# Patient Record
Sex: FEMALE | Race: WHITE | NOT HISPANIC OR LATINO | Employment: OTHER | URBAN - METROPOLITAN AREA
[De-identification: names, ages, dates, MRNs, and addresses within clinical notes are randomized per-mention and may not be internally consistent; named-entity substitution may affect disease eponyms.]

---

## 2017-03-08 ENCOUNTER — HOSPITAL ENCOUNTER (OUTPATIENT)
Dept: ONCOLOGY | Facility: CLINIC | Age: 67
Discharge: HOME | End: 2017-03-08
Attending: INTERNAL MEDICINE | Admitting: INTERNAL MEDICINE

## 2017-03-08 LAB
ALBUMIN SERPL-MCNC: 4 G/DL (ref 3.5–4.8)
ALBUMIN/GLOB SERPL: 1.5 {RATIO} (ref 1–1.7)
ALP SERPL-CCNC: 43 IU/L (ref 32–91)
ALT SERPL-CCNC: 26 IU/L (ref 14–54)
ANION GAP SERPL CALC-SCNC: 15.4 MMOL/L (ref 10–20)
AST SERPL-CCNC: 33 IU/L (ref 15–41)
BILIRUB SERPL-MCNC: 0.5 MG/DL (ref 0.3–1.2)
BUN SERPL-MCNC: 7 MG/DL (ref 8–20)
BUN/CREAT SERPL: 8.8 (ref 5.4–26.2)
CALCIUM SERPL-MCNC: 9.2 MG/DL (ref 8.9–10.3)
CHLORIDE SERPL-SCNC: 96 MMOL/L (ref 101–111)
CONV CO2: 28 MMOL/L (ref 22–32)
CONV TOTAL PROTEIN: 6.6 G/DL (ref 6.1–7.9)
CREAT UR-MCNC: 0.8 MG/DL (ref 0.4–1)
GLOBULIN UR ELPH-MCNC: 2.6 G/DL (ref 2.5–3.8)
GLUCOSE SERPL-MCNC: 125 MG/DL (ref 65–99)
POTASSIUM SERPL-SCNC: 3.4 MMOL/L (ref 3.6–5.1)
SODIUM SERPL-SCNC: 136 MMOL/L (ref 136–144)

## 2017-06-07 ENCOUNTER — HOSPITAL ENCOUNTER (OUTPATIENT)
Dept: LAB | Facility: HOSPITAL | Age: 67
Discharge: HOME | End: 2017-06-07
Attending: OTOLARYNGOLOGY | Admitting: OTOLARYNGOLOGY

## 2017-06-09 LAB
ANA SER QL IA: NORMAL
EBV AB TO VIRAL CAPSID AG, IGM: NORMAL
ENA SS-B AB SER-ACNC: NEGATIVE
SJOGREN'S ANTI-SS-A: NEGATIVE

## 2017-06-30 ENCOUNTER — HOSPITAL ENCOUNTER (OUTPATIENT)
Dept: OTHER | Facility: HOSPITAL | Age: 67
Setting detail: SPECIMEN
Discharge: HOME | End: 2017-06-30
Attending: OTOLARYNGOLOGY | Admitting: OTOLARYNGOLOGY

## 2017-07-03 ENCOUNTER — HOSPITAL ENCOUNTER (OUTPATIENT)
Dept: LAB | Facility: HOSPITAL | Age: 67
Discharge: HOME | End: 2017-07-03
Attending: OTOLARYNGOLOGY | Admitting: OTOLARYNGOLOGY

## 2017-07-03 LAB
FOLATE SERPL-MCNC: >24.8 NG/ML (ref 5.9–24.8)
VIT B12 SERPL-MCNC: >1500 PG/ML (ref 180–914)

## 2017-07-21 ENCOUNTER — CONVERSION ENCOUNTER (OUTPATIENT)
Dept: CARDIOLOGY | Facility: CLINIC | Age: 67
End: 2017-07-21

## 2017-07-21 ENCOUNTER — HOSPITAL ENCOUNTER (OUTPATIENT)
Dept: CARDIOLOGY | Facility: HOSPITAL | Age: 67
Discharge: HOME | End: 2017-07-21
Attending: INTERNAL MEDICINE | Admitting: INTERNAL MEDICINE

## 2017-09-05 ENCOUNTER — CLINICAL SUPPORT (OUTPATIENT)
Dept: ONCOLOGY | Facility: HOSPITAL | Age: 67
End: 2017-09-05

## 2017-09-05 ENCOUNTER — HOSPITAL ENCOUNTER (OUTPATIENT)
Dept: ONCOLOGY | Facility: CLINIC | Age: 67
Setting detail: INFUSION SERIES
Discharge: HOME | End: 2017-09-05
Attending: INTERNAL MEDICINE | Admitting: INTERNAL MEDICINE

## 2017-09-05 ENCOUNTER — HOSPITAL ENCOUNTER (OUTPATIENT)
Dept: ONCOLOGY | Facility: HOSPITAL | Age: 67
Discharge: HOME | End: 2017-09-05
Attending: INTERNAL MEDICINE | Admitting: INTERNAL MEDICINE

## 2017-09-05 NOTE — PROGRESS NOTES
PATIENTS ONCOLOGY RECORD LOCATED IN [facility]      Subjective     Name:  KATJA MCMAHON     Date:  2017  Address:  [place] [address]  Home: [phone]  :  1950 AGE:  66 y.o.        RECORDS OBTAINED:  Patients Oncology Record is located in [facility]

## 2017-09-12 ENCOUNTER — HOSPITAL ENCOUNTER (OUTPATIENT)
Dept: MAMMOGRAPHY | Facility: HOSPITAL | Age: 67
Discharge: HOME | End: 2017-09-12
Attending: INTERNAL MEDICINE | Admitting: INTERNAL MEDICINE

## 2017-09-19 ENCOUNTER — HOSPITAL ENCOUNTER (OUTPATIENT)
Dept: MAMMOGRAPHY | Facility: HOSPITAL | Age: 67
Discharge: HOME | End: 2017-09-19
Attending: INTERNAL MEDICINE | Admitting: INTERNAL MEDICINE

## 2017-09-22 ENCOUNTER — HOSPITAL ENCOUNTER (OUTPATIENT)
Dept: ONCOLOGY | Facility: CLINIC | Age: 67
Setting detail: INFUSION SERIES
Discharge: HOME | End: 2017-09-22
Attending: INTERNAL MEDICINE | Admitting: INTERNAL MEDICINE

## 2017-09-22 ENCOUNTER — CLINICAL SUPPORT (OUTPATIENT)
Dept: ONCOLOGY | Facility: HOSPITAL | Age: 67
End: 2017-09-22

## 2017-09-22 ENCOUNTER — HOSPITAL ENCOUNTER (OUTPATIENT)
Dept: ONCOLOGY | Facility: HOSPITAL | Age: 67
Discharge: HOME | End: 2017-09-22
Attending: INTERNAL MEDICINE | Admitting: INTERNAL MEDICINE

## 2017-09-22 NOTE — PROGRESS NOTES
PATIENTS ONCOLOGY RECORD LOCATED IN [facility]      Subjective     Name:  KATJA MCMAHON     Date:  2017  Address:  [place] [address]  Home: [phone]  :  1950 AGE:  66 y.o.        RECORDS OBTAINED:  Patients Oncology Record is located in [facility]

## 2017-09-29 ENCOUNTER — HOSPITAL ENCOUNTER (OUTPATIENT)
Dept: ONCOLOGY | Facility: HOSPITAL | Age: 67
Discharge: HOME | End: 2017-09-29
Attending: INTERNAL MEDICINE | Admitting: INTERNAL MEDICINE

## 2017-10-02 ENCOUNTER — HOSPITAL ENCOUNTER (OUTPATIENT)
Dept: MAMMOGRAPHY | Facility: HOSPITAL | Age: 67
Discharge: HOME | End: 2017-10-02
Attending: INTERNAL MEDICINE | Admitting: INTERNAL MEDICINE

## 2017-10-05 ENCOUNTER — HOSPITAL ENCOUNTER (OUTPATIENT)
Dept: ONCOLOGY | Facility: HOSPITAL | Age: 67
Discharge: HOME | End: 2017-10-05
Attending: INTERNAL MEDICINE | Admitting: INTERNAL MEDICINE

## 2017-10-05 ENCOUNTER — CLINICAL SUPPORT (OUTPATIENT)
Dept: ONCOLOGY | Facility: HOSPITAL | Age: 67
End: 2017-10-05

## 2017-10-05 ENCOUNTER — HOSPITAL ENCOUNTER (OUTPATIENT)
Dept: ONCOLOGY | Facility: CLINIC | Age: 67
Setting detail: INFUSION SERIES
Discharge: HOME | End: 2017-10-05
Attending: INTERNAL MEDICINE | Admitting: INTERNAL MEDICINE

## 2017-10-05 LAB
T3 SERPL-MCNC: 1.11 NG/ML (ref 0.87–1.78)
T4 SERPL-MCNC: 11.54 UG/DL (ref 6.1–12.2)
TSH SERPL-ACNC: 0.51 UIU/ML (ref 0.34–5.6)

## 2017-10-05 NOTE — PROGRESS NOTES
PATIENTS ONCOLOGY RECORD LOCATED IN [facility]      Subjective     Name:  KATJA MCMAHON     Date:  10/05/2017  Address:  [place] [address]  Home: [phone]  :  1950 AGE:  66 y.o.        RECORDS OBTAINED:  Patients Oncology Record is located in [facility]

## 2017-10-10 ENCOUNTER — CONVERSION ENCOUNTER (OUTPATIENT)
Dept: CARDIOLOGY | Facility: CLINIC | Age: 67
End: 2017-10-10

## 2017-10-11 ENCOUNTER — HOSPITAL ENCOUNTER (OUTPATIENT)
Dept: ULTRASOUND IMAGING | Facility: HOSPITAL | Age: 67
Discharge: HOME | End: 2017-10-11
Attending: INTERNAL MEDICINE | Admitting: INTERNAL MEDICINE

## 2017-11-01 ENCOUNTER — HOSPITAL ENCOUNTER (OUTPATIENT)
Dept: PREADMISSION TESTING | Facility: HOSPITAL | Age: 67
Discharge: HOME | End: 2017-11-01
Attending: SURGERY | Admitting: SURGERY

## 2017-11-01 LAB
ANION GAP SERPL CALC-SCNC: 16.7 MMOL/L (ref 10–20)
APTT BLD: 24.2 SEC (ref 24–31)
BASOPHILS # BLD AUTO: 0 10*3/UL (ref 0–0.2)
BASOPHILS NFR BLD AUTO: 0 % (ref 0–2)
BUN SERPL-MCNC: 13 MG/DL (ref 8–20)
BUN/CREAT SERPL: 14.4 (ref 5.4–26.2)
CALCIUM SERPL-MCNC: 9.1 MG/DL (ref 8.9–10.3)
CHLORIDE SERPL-SCNC: 99 MMOL/L (ref 101–111)
CONV CO2: 26 MMOL/L (ref 22–32)
CREAT UR-MCNC: 0.9 MG/DL (ref 0.4–1)
DIFFERENTIAL METHOD BLD: (no result)
EOSINOPHIL # BLD AUTO: 0 10*3/UL (ref 0–0.3)
EOSINOPHIL # BLD AUTO: 1 % (ref 0–3)
ERYTHROCYTE [DISTWIDTH] IN BLOOD BY AUTOMATED COUNT: 13.3 % (ref 11.5–14.5)
GLUCOSE SERPL-MCNC: 91 MG/DL (ref 65–99)
HCT VFR BLD AUTO: 38.4 % (ref 35–49)
HGB BLD-MCNC: 12.8 G/DL (ref 12–15)
INR PPP: 0.9
LYMPHOCYTES # BLD AUTO: 2.2 10*3/UL (ref 0.8–4.8)
LYMPHOCYTES NFR BLD AUTO: 32 % (ref 18–42)
MCH RBC QN AUTO: 29.4 PG (ref 26–32)
MCHC RBC AUTO-ENTMCNC: 33.5 G/DL (ref 32–36)
MCV RBC AUTO: 87.8 FL (ref 80–94)
MONOCYTES # BLD AUTO: 0.4 10*3/UL (ref 0.1–1.3)
MONOCYTES NFR BLD AUTO: 5 % (ref 2–11)
NEUTROPHILS # BLD AUTO: 4.3 10*3/UL (ref 2.3–8.6)
NEUTROPHILS NFR BLD AUTO: 62 % (ref 50–75)
NRBC BLD AUTO-RTO: 0 /100{WBCS}
NRBC/RBC NFR BLD MANUAL: 0 10*3/UL
PLATELET # BLD AUTO: 237 10*3/UL (ref 150–450)
PMV BLD AUTO: 8.9 FL (ref 7.4–10.4)
POTASSIUM SERPL-SCNC: 3.7 MMOL/L (ref 3.6–5.1)
PROTHROMBIN TIME: 10.2 SEC (ref 9.6–11.7)
RBC # BLD AUTO: 4.37 10*6/UL (ref 4–5.4)
SODIUM SERPL-SCNC: 138 MMOL/L (ref 136–144)
WBC # BLD AUTO: 6.9 10*3/UL (ref 4.5–11.5)

## 2017-11-07 ENCOUNTER — HOSPITAL ENCOUNTER (OUTPATIENT)
Dept: PREOP | Facility: HOSPITAL | Age: 67
Setting detail: HOSPITAL OUTPATIENT SURGERY
Discharge: HOME | End: 2017-11-07
Attending: SURGERY | Admitting: SURGERY

## 2017-11-21 ENCOUNTER — CONVERSION ENCOUNTER (OUTPATIENT)
Dept: CARDIOLOGY | Facility: CLINIC | Age: 67
End: 2017-11-21

## 2017-11-29 ENCOUNTER — HOSPITAL ENCOUNTER (OUTPATIENT)
Dept: ONCOLOGY | Facility: HOSPITAL | Age: 67
Discharge: HOME | End: 2017-11-29
Attending: INTERNAL MEDICINE | Admitting: INTERNAL MEDICINE

## 2017-11-29 ENCOUNTER — HOSPITAL ENCOUNTER (OUTPATIENT)
Dept: ONCOLOGY | Facility: CLINIC | Age: 67
Setting detail: INFUSION SERIES
Discharge: HOME | End: 2017-11-29
Attending: INTERNAL MEDICINE | Admitting: INTERNAL MEDICINE

## 2017-11-29 ENCOUNTER — CLINICAL SUPPORT (OUTPATIENT)
Dept: ONCOLOGY | Facility: HOSPITAL | Age: 67
End: 2017-11-29

## 2017-11-29 LAB
ALBUMIN SERPL-MCNC: 4 G/DL (ref 3.5–4.8)
ALBUMIN/GLOB SERPL: 1.3 {RATIO} (ref 1–1.7)
ALP SERPL-CCNC: 54 IU/L (ref 32–91)
ALT SERPL-CCNC: 35 IU/L (ref 14–54)
ANION GAP SERPL CALC-SCNC: 10.3 MMOL/L (ref 10–20)
AST SERPL-CCNC: 43 IU/L (ref 15–41)
BILIRUB SERPL-MCNC: 0.4 MG/DL (ref 0.3–1.2)
BUN SERPL-MCNC: 6 MG/DL (ref 8–20)
BUN/CREAT SERPL: 6.7 (ref 5.4–26.2)
CALCIUM SERPL-MCNC: 8.9 MG/DL (ref 8.9–10.3)
CHLORIDE SERPL-SCNC: 100 MMOL/L (ref 101–111)
CONV CO2: 27 MMOL/L (ref 22–32)
CONV TOTAL PROTEIN: 7 G/DL (ref 6.1–7.9)
CREAT UR-MCNC: 0.9 MG/DL (ref 0.4–1)
GLOBULIN UR ELPH-MCNC: 3 G/DL (ref 2.5–3.8)
GLUCOSE SERPL-MCNC: 112 MG/DL (ref 65–99)
POTASSIUM SERPL-SCNC: 3.3 MMOL/L (ref 3.6–5.1)
SODIUM SERPL-SCNC: 134 MMOL/L (ref 136–144)

## 2017-11-29 NOTE — PROGRESS NOTES
PATIENTS ONCOLOGY RECORD LOCATED IN [facility]      Subjective     Name:  KATJA MCMAHON     Date:  2017  Address:  [place] [address]  Home: [phone]  :  1950 AGE:  67 y.o.        RECORDS OBTAINED:  Patients Oncology Record is located in [facility]

## 2017-12-05 ENCOUNTER — CONVERSION ENCOUNTER (OUTPATIENT)
Dept: CARDIOLOGY | Facility: CLINIC | Age: 67
End: 2017-12-05

## 2017-12-05 ENCOUNTER — HOSPITAL ENCOUNTER (OUTPATIENT)
Dept: CARDIOLOGY | Facility: HOSPITAL | Age: 67
Discharge: HOME | End: 2017-12-05
Attending: INTERNAL MEDICINE | Admitting: INTERNAL MEDICINE

## 2017-12-07 ENCOUNTER — HOSPITAL ENCOUNTER (OUTPATIENT)
Dept: PREADMISSION TESTING | Facility: HOSPITAL | Age: 67
Discharge: HOME | End: 2017-12-07
Attending: SURGERY | Admitting: SURGERY

## 2017-12-07 LAB
ANION GAP SERPL CALC-SCNC: 9.8 MMOL/L (ref 10–20)
BASOPHILS # BLD AUTO: 0 10*3/UL (ref 0–0.2)
BASOPHILS NFR BLD AUTO: 0 % (ref 0–2)
BUN SERPL-MCNC: 8 MG/DL (ref 8–20)
BUN/CREAT SERPL: 11.4 (ref 5.4–26.2)
CALCIUM SERPL-MCNC: 9 MG/DL (ref 8.9–10.3)
CHLORIDE SERPL-SCNC: 104 MMOL/L (ref 101–111)
CONV CO2: 28 MMOL/L (ref 22–32)
CREAT UR-MCNC: 0.7 MG/DL (ref 0.4–1)
DIFFERENTIAL METHOD BLD: (no result)
EOSINOPHIL # BLD AUTO: 0.2 10*3/UL (ref 0–0.3)
EOSINOPHIL # BLD AUTO: 3 % (ref 0–3)
ERYTHROCYTE [DISTWIDTH] IN BLOOD BY AUTOMATED COUNT: 13.5 % (ref 11.5–14.5)
GLUCOSE SERPL-MCNC: 107 MG/DL (ref 65–99)
HCT VFR BLD AUTO: 37.2 % (ref 35–49)
HGB BLD-MCNC: 12.3 G/DL (ref 12–15)
INR PPP: 0.9
LYMPHOCYTES # BLD AUTO: 2.1 10*3/UL (ref 0.8–4.8)
LYMPHOCYTES NFR BLD AUTO: 34 % (ref 18–42)
MCH RBC QN AUTO: 29.7 PG (ref 26–32)
MCHC RBC AUTO-ENTMCNC: 33.2 G/DL (ref 32–36)
MCV RBC AUTO: 89.5 FL (ref 80–94)
MONOCYTES # BLD AUTO: 0.4 10*3/UL (ref 0.1–1.3)
MONOCYTES NFR BLD AUTO: 6 % (ref 2–11)
NEUTROPHILS # BLD AUTO: 3.4 10*3/UL (ref 2.3–8.6)
NEUTROPHILS NFR BLD AUTO: 57 % (ref 50–75)
NRBC BLD AUTO-RTO: 0 /100{WBCS}
NRBC/RBC NFR BLD MANUAL: 0 10*3/UL
PLATELET # BLD AUTO: 291 10*3/UL (ref 150–450)
PMV BLD AUTO: 8.4 FL (ref 7.4–10.4)
POTASSIUM SERPL-SCNC: 3.8 MMOL/L (ref 3.6–5.1)
PROTHROMBIN TIME: 10.2 SEC (ref 9.6–11.7)
RBC # BLD AUTO: 4.16 10*6/UL (ref 4–5.4)
SODIUM SERPL-SCNC: 138 MMOL/L (ref 136–144)
WBC # BLD AUTO: 6.1 10*3/UL (ref 4.5–11.5)

## 2017-12-08 ENCOUNTER — HOSPITAL ENCOUNTER (OUTPATIENT)
Dept: PREOP | Facility: HOSPITAL | Age: 67
Setting detail: HOSPITAL OUTPATIENT SURGERY
Discharge: HOME | End: 2017-12-08
Attending: SURGERY | Admitting: SURGERY

## 2017-12-12 ENCOUNTER — HOSPITAL ENCOUNTER (OUTPATIENT)
Dept: ONCOLOGY | Facility: HOSPITAL | Age: 67
Discharge: HOME | End: 2017-12-12
Attending: INTERNAL MEDICINE | Admitting: INTERNAL MEDICINE

## 2017-12-12 ENCOUNTER — HOSPITAL ENCOUNTER (OUTPATIENT)
Dept: ONCOLOGY | Facility: CLINIC | Age: 67
Setting detail: INFUSION SERIES
Discharge: HOME | End: 2017-12-12
Attending: INTERNAL MEDICINE | Admitting: INTERNAL MEDICINE

## 2017-12-12 ENCOUNTER — CLINICAL SUPPORT (OUTPATIENT)
Dept: ONCOLOGY | Facility: HOSPITAL | Age: 67
End: 2017-12-12

## 2017-12-12 NOTE — PROGRESS NOTES
PATIENTS ONCOLOGY RECORD LOCATED IN [employer]      Subjective     Name:  KATJA MCMAHON     Date:  2017  Address:  [place] [address]  Home: [phone]  :  1950 AGE:  67 y.o.        RECORDS OBTAINED:  Patients Oncology Record is located in [facility]

## 2017-12-14 ENCOUNTER — CLINICAL SUPPORT (OUTPATIENT)
Dept: ONCOLOGY | Facility: HOSPITAL | Age: 67
End: 2017-12-14

## 2017-12-14 ENCOUNTER — HOSPITAL ENCOUNTER (OUTPATIENT)
Dept: ONCOLOGY | Facility: CLINIC | Age: 67
Setting detail: INFUSION SERIES
Discharge: HOME | End: 2017-12-14
Attending: INTERNAL MEDICINE | Admitting: INTERNAL MEDICINE

## 2017-12-14 ENCOUNTER — HOSPITAL ENCOUNTER (OUTPATIENT)
Dept: ONCOLOGY | Facility: HOSPITAL | Age: 67
Discharge: HOME | End: 2017-12-14
Attending: INTERNAL MEDICINE | Admitting: INTERNAL MEDICINE

## 2017-12-14 LAB
ALBUMIN SERPL-MCNC: 3.8 G/DL (ref 3.5–4.8)
ALBUMIN/GLOB SERPL: 1.5 {RATIO} (ref 1–1.7)
ALP SERPL-CCNC: 53 IU/L (ref 32–91)
ALT SERPL-CCNC: 28 IU/L (ref 14–54)
ANION GAP SERPL CALC-SCNC: 12.8 MMOL/L (ref 10–20)
AST SERPL-CCNC: 32 IU/L (ref 15–41)
BILIRUB SERPL-MCNC: 0.5 MG/DL (ref 0.3–1.2)
BUN SERPL-MCNC: 8 MG/DL (ref 8–20)
BUN/CREAT SERPL: 10 (ref 5.4–26.2)
CALCIUM SERPL-MCNC: 9 MG/DL (ref 8.9–10.3)
CHLORIDE SERPL-SCNC: 101 MMOL/L (ref 101–111)
CONV CO2: 26 MMOL/L (ref 22–32)
CONV TOTAL PROTEIN: 6.4 G/DL (ref 6.1–7.9)
CREAT UR-MCNC: 0.8 MG/DL (ref 0.4–1)
GLOBULIN UR ELPH-MCNC: 2.6 G/DL (ref 2.5–3.8)
GLUCOSE SERPL-MCNC: 101 MG/DL (ref 65–99)
POTASSIUM SERPL-SCNC: 3.8 MMOL/L (ref 3.6–5.1)
SODIUM SERPL-SCNC: 136 MMOL/L (ref 136–144)

## 2017-12-14 NOTE — PROGRESS NOTES
PATIENTS ONCOLOGY RECORD LOCATED IN [employer]      Subjective     Name:  KATJA MCMAHON     Date:  2017  Address:  [place] [address]  Home: [phone]  :  1950 AGE:  67 y.o.        RECORDS OBTAINED:  Patients Oncology Record is located in [facility]

## 2017-12-18 ENCOUNTER — HOSPITAL ENCOUNTER (OUTPATIENT)
Dept: ONCOLOGY | Facility: HOSPITAL | Age: 67
Discharge: HOME | End: 2017-12-18
Attending: INTERNAL MEDICINE | Admitting: INTERNAL MEDICINE

## 2017-12-18 ENCOUNTER — CLINICAL SUPPORT (OUTPATIENT)
Dept: ONCOLOGY | Facility: HOSPITAL | Age: 67
End: 2017-12-18

## 2017-12-18 ENCOUNTER — HOSPITAL ENCOUNTER (OUTPATIENT)
Dept: ONCOLOGY | Facility: CLINIC | Age: 67
Setting detail: INFUSION SERIES
Discharge: HOME | End: 2017-12-18
Attending: INTERNAL MEDICINE | Admitting: INTERNAL MEDICINE

## 2017-12-18 NOTE — PROGRESS NOTES
PATIENTS ONCOLOGY RECORD LOCATED IN [facility]      Subjective     Name:  KATJA MCMAHON     Date:  2017  Address:  [place] [address]  Home: [phone]  :  1950 AGE:  67 y.o.        RECORDS OBTAINED:  Patients Oncology Record is located in [facility]

## 2017-12-28 ENCOUNTER — CONVERSION ENCOUNTER (OUTPATIENT)
Dept: CARDIOLOGY | Facility: CLINIC | Age: 67
End: 2017-12-28

## 2017-12-28 ENCOUNTER — HOSPITAL ENCOUNTER (OUTPATIENT)
Dept: ONCOLOGY | Facility: HOSPITAL | Age: 67
Discharge: HOME | End: 2017-12-28
Attending: INTERNAL MEDICINE | Admitting: INTERNAL MEDICINE

## 2017-12-28 ENCOUNTER — HOSPITAL ENCOUNTER (OUTPATIENT)
Dept: ONCOLOGY | Facility: CLINIC | Age: 67
Setting detail: INFUSION SERIES
Discharge: HOME | End: 2017-12-28
Attending: INTERNAL MEDICINE | Admitting: INTERNAL MEDICINE

## 2017-12-28 ENCOUNTER — CLINICAL SUPPORT (OUTPATIENT)
Dept: ONCOLOGY | Facility: HOSPITAL | Age: 67
End: 2017-12-28

## 2017-12-28 LAB
ALBUMIN SERPL-MCNC: 3.6 G/DL (ref 3.5–4.8)
ALBUMIN/GLOB SERPL: 1.1 {RATIO} (ref 1–1.7)
ALP SERPL-CCNC: 58 IU/L (ref 32–91)
ALT SERPL-CCNC: 26 IU/L (ref 14–54)
ANION GAP SERPL CALC-SCNC: 13.8 MMOL/L (ref 10–20)
AST SERPL-CCNC: 30 IU/L (ref 15–41)
BILIRUB SERPL-MCNC: 0.2 MG/DL (ref 0.3–1.2)
BUN SERPL-MCNC: 4 MG/DL (ref 8–20)
BUN/CREAT SERPL: 5.7 (ref 5.4–26.2)
CALCIUM SERPL-MCNC: 8.8 MG/DL (ref 8.9–10.3)
CHLORIDE SERPL-SCNC: 99 MMOL/L (ref 101–111)
CONV CO2: 27 MMOL/L (ref 22–32)
CONV TOTAL PROTEIN: 6.9 G/DL (ref 6.1–7.9)
CREAT UR-MCNC: 0.7 MG/DL (ref 0.4–1)
GLOBULIN UR ELPH-MCNC: 3.3 G/DL (ref 2.5–3.8)
GLUCOSE SERPL-MCNC: 120 MG/DL (ref 65–99)
POTASSIUM SERPL-SCNC: 3.8 MMOL/L (ref 3.6–5.1)
SODIUM SERPL-SCNC: 136 MMOL/L (ref 136–144)

## 2017-12-28 NOTE — PROGRESS NOTES
PATIENTS ONCOLOGY RECORD LOCATED IN [employer]      Subjective     Name:  KATJA MCMAHON     Date:  2017  Address:  [place] [address]  Home: [phone]  :  1950 AGE:  67 y.o.        RECORDS OBTAINED:  Patients Oncology Record is located in [facility]

## 2018-01-04 ENCOUNTER — HOSPITAL ENCOUNTER (OUTPATIENT)
Dept: ONCOLOGY | Facility: CLINIC | Age: 68
Setting detail: INFUSION SERIES
Discharge: HOME | End: 2018-01-04
Attending: INTERNAL MEDICINE | Admitting: INTERNAL MEDICINE

## 2018-01-04 ENCOUNTER — CLINICAL SUPPORT (OUTPATIENT)
Dept: ONCOLOGY | Facility: HOSPITAL | Age: 68
End: 2018-01-04

## 2018-01-04 NOTE — PROGRESS NOTES
PATIENTS ONCOLOGY RECORD LOCATED IN [employer]      Subjective     Name:  KATJA MCMAHON     Date:  2018  Address:  [place] [address]  Home: [phone]  :  1950 AGE:  67 y.o.        RECORDS OBTAINED:  Patients Oncology Record is located in [facility]

## 2018-01-10 ENCOUNTER — CLINICAL SUPPORT (OUTPATIENT)
Dept: ONCOLOGY | Facility: HOSPITAL | Age: 68
End: 2018-01-10

## 2018-01-10 ENCOUNTER — HOSPITAL ENCOUNTER (OUTPATIENT)
Dept: ONCOLOGY | Facility: CLINIC | Age: 68
Setting detail: INFUSION SERIES
Discharge: HOME | End: 2018-01-10
Attending: INTERNAL MEDICINE | Admitting: INTERNAL MEDICINE

## 2018-01-10 NOTE — PROGRESS NOTES
PATIENTS ONCOLOGY RECORD LOCATED IN [employer]      Subjective     Name:  KATJA MCMAHON     Date:  01/10/2018  Address:  [place] [address]  Home: [phone]  :  1950 AGE:  67 y.o.        RECORDS OBTAINED:  Patients Oncology Record is located in [facility]

## 2018-01-11 ENCOUNTER — HOSPITAL ENCOUNTER (OUTPATIENT)
Dept: LAB | Facility: HOSPITAL | Age: 68
Discharge: HOME | End: 2018-01-11
Attending: INTERNAL MEDICINE | Admitting: INTERNAL MEDICINE

## 2018-01-11 LAB
ALBUMIN SERPL-MCNC: 3.5 G/DL (ref 3.5–4.8)
ALBUMIN/GLOB SERPL: 1.5 {RATIO} (ref 1–1.7)
ALP SERPL-CCNC: 53 IU/L (ref 32–91)
ALT SERPL-CCNC: 15 IU/L (ref 14–54)
ANION GAP SERPL CALC-SCNC: 10.6 MMOL/L (ref 10–20)
AST SERPL-CCNC: 14 IU/L (ref 15–41)
BILIRUB SERPL-MCNC: 0.9 MG/DL (ref 0.3–1.2)
BUN SERPL-MCNC: 12 MG/DL (ref 8–20)
BUN/CREAT SERPL: 17.1 (ref 5.4–26.2)
CALCIUM SERPL-MCNC: 8.7 MG/DL (ref 8.9–10.3)
CHLORIDE SERPL-SCNC: 100 MMOL/L (ref 101–111)
CHOLEST SERPL-MCNC: 185 MG/DL
CHOLEST/HDLC SERPL: 3.5 {RATIO}
CK SERPL-CCNC: 17 IU/L (ref 38–234)
CONV CO2: 29 MMOL/L (ref 22–32)
CONV LDL CHOLESTEROL DIRECT: 105 MG/DL (ref 0–100)
CONV TOTAL PROTEIN: 5.8 G/DL (ref 6.1–7.9)
CREAT UR-MCNC: 0.7 MG/DL (ref 0.4–1)
GLOBULIN UR ELPH-MCNC: 2.3 G/DL (ref 2.5–3.8)
GLUCOSE SERPL-MCNC: 120 MG/DL (ref 65–99)
HDLC SERPL-MCNC: 53 MG/DL
LDLC/HDLC SERPL: 2 {RATIO}
LIPID INTERPRETATION: ABNORMAL
POTASSIUM SERPL-SCNC: 3.6 MMOL/L (ref 3.6–5.1)
SODIUM SERPL-SCNC: 136 MMOL/L (ref 136–144)
TRIGL SERPL-MCNC: 78 MG/DL
VLDLC SERPL CALC-MCNC: 27 MG/DL

## 2018-01-17 ENCOUNTER — HOSPITAL ENCOUNTER (OUTPATIENT)
Dept: ONCOLOGY | Facility: HOSPITAL | Age: 68
Discharge: HOME | End: 2018-01-17
Attending: INTERNAL MEDICINE | Admitting: INTERNAL MEDICINE

## 2018-01-17 ENCOUNTER — CLINICAL SUPPORT (OUTPATIENT)
Dept: ONCOLOGY | Facility: HOSPITAL | Age: 68
End: 2018-01-17

## 2018-01-17 ENCOUNTER — HOSPITAL ENCOUNTER (OUTPATIENT)
Dept: ONCOLOGY | Facility: CLINIC | Age: 68
Setting detail: INFUSION SERIES
Discharge: HOME | End: 2018-01-17
Attending: INTERNAL MEDICINE | Admitting: INTERNAL MEDICINE

## 2018-01-17 LAB
ALBUMIN SERPL-MCNC: 3.8 G/DL (ref 3.5–4.8)
ALBUMIN/GLOB SERPL: 1.5 {RATIO} (ref 1–1.7)
ALP SERPL-CCNC: 63 IU/L (ref 32–91)
ALT SERPL-CCNC: 13 IU/L (ref 14–54)
ANION GAP SERPL CALC-SCNC: 12.6 MMOL/L (ref 10–20)
AST SERPL-CCNC: 19 IU/L (ref 15–41)
BILIRUB SERPL-MCNC: 0.3 MG/DL (ref 0.3–1.2)
BUN SERPL-MCNC: 7 MG/DL (ref 8–20)
BUN/CREAT SERPL: 10 (ref 5.4–26.2)
CALCIUM SERPL-MCNC: 9.3 MG/DL (ref 8.9–10.3)
CHLORIDE SERPL-SCNC: 100 MMOL/L (ref 101–111)
CONV CO2: 28 MMOL/L (ref 22–32)
CONV TOTAL PROTEIN: 6.4 G/DL (ref 6.1–7.9)
CREAT UR-MCNC: 0.7 MG/DL (ref 0.4–1)
GLOBULIN UR ELPH-MCNC: 2.6 G/DL (ref 2.5–3.8)
GLUCOSE SERPL-MCNC: 115 MG/DL (ref 65–99)
POTASSIUM SERPL-SCNC: 3.6 MMOL/L (ref 3.6–5.1)
SODIUM SERPL-SCNC: 137 MMOL/L (ref 136–144)

## 2018-01-17 NOTE — PROGRESS NOTES
PATIENTS ONCOLOGY RECORD LOCATED IN [employer]      Subjective     Name:  KATJA MCMAHON     Date:  2018  Address:  [place] [address]  Home: [phone]  :  1950 AGE:  67 y.o.        RECORDS OBTAINED:  Patients Oncology Record is located in [facility]

## 2018-01-19 ENCOUNTER — CONVERSION ENCOUNTER (OUTPATIENT)
Dept: CARDIOLOGY | Facility: CLINIC | Age: 68
End: 2018-01-19

## 2018-01-25 ENCOUNTER — HOSPITAL ENCOUNTER (OUTPATIENT)
Dept: ONCOLOGY | Facility: CLINIC | Age: 68
Setting detail: INFUSION SERIES
Discharge: HOME | End: 2018-01-25
Attending: INTERNAL MEDICINE | Admitting: INTERNAL MEDICINE

## 2018-01-25 ENCOUNTER — HOSPITAL ENCOUNTER (OUTPATIENT)
Dept: ONCOLOGY | Facility: HOSPITAL | Age: 68
Discharge: HOME | End: 2018-01-25
Attending: INTERNAL MEDICINE | Admitting: INTERNAL MEDICINE

## 2018-01-25 ENCOUNTER — CLINICAL SUPPORT (OUTPATIENT)
Dept: ONCOLOGY | Facility: HOSPITAL | Age: 68
End: 2018-01-25

## 2018-01-25 LAB
ALBUMIN SERPL-MCNC: 3.5 G/DL (ref 3.5–4.8)
ALBUMIN/GLOB SERPL: 1.3 {RATIO} (ref 1–1.7)
ALP SERPL-CCNC: 50 IU/L (ref 32–91)
ALT SERPL-CCNC: 21 IU/L (ref 14–54)
ANION GAP SERPL CALC-SCNC: 12.4 MMOL/L (ref 10–20)
AST SERPL-CCNC: 27 IU/L (ref 15–41)
BILIRUB SERPL-MCNC: 0.4 MG/DL (ref 0.3–1.2)
BUN SERPL-MCNC: 12 MG/DL (ref 8–20)
BUN/CREAT SERPL: 20 (ref 5.4–26.2)
CALCIUM SERPL-MCNC: 9.1 MG/DL (ref 8.9–10.3)
CHLORIDE SERPL-SCNC: 101 MMOL/L (ref 101–111)
CONV CO2: 26 MMOL/L (ref 22–32)
CONV TOTAL PROTEIN: 6.2 G/DL (ref 6.1–7.9)
CREAT UR-MCNC: 0.6 MG/DL (ref 0.4–1)
GLOBULIN UR ELPH-MCNC: 2.7 G/DL (ref 2.5–3.8)
GLUCOSE SERPL-MCNC: 120 MG/DL (ref 65–99)
POTASSIUM SERPL-SCNC: 3.4 MMOL/L (ref 3.6–5.1)
SODIUM SERPL-SCNC: 136 MMOL/L (ref 136–144)

## 2018-01-25 NOTE — PROGRESS NOTES
PATIENTS ONCOLOGY RECORD LOCATED IN [facility]      Subjective     Name:  KATJA MCMAHON     Date:  2018  Address:  [place] [address]  Home: [phone]  :  1950 AGE:  67 y.o.        RECORDS OBTAINED:  Patients Oncology Record is located in [facility]

## 2018-01-31 ENCOUNTER — HOSPITAL ENCOUNTER (OUTPATIENT)
Dept: ONCOLOGY | Facility: CLINIC | Age: 68
Setting detail: INFUSION SERIES
Discharge: HOME | End: 2018-01-31
Attending: INTERNAL MEDICINE | Admitting: INTERNAL MEDICINE

## 2018-01-31 ENCOUNTER — CLINICAL SUPPORT (OUTPATIENT)
Dept: ONCOLOGY | Facility: HOSPITAL | Age: 68
End: 2018-01-31

## 2018-01-31 NOTE — PROGRESS NOTES
PATIENTS ONCOLOGY RECORD LOCATED IN [facility]      Subjective     Name:  KATJA MCMAHON     Date:  2018  Address:  [place] [address]  Home: [phone]  :  1950 AGE:  67 y.o.        RECORDS OBTAINED:  Patients Oncology Record is located in [facility]

## 2018-02-08 ENCOUNTER — CLINICAL SUPPORT (OUTPATIENT)
Dept: ONCOLOGY | Facility: HOSPITAL | Age: 68
End: 2018-02-08

## 2018-02-08 ENCOUNTER — HOSPITAL ENCOUNTER (OUTPATIENT)
Dept: ONCOLOGY | Facility: CLINIC | Age: 68
Setting detail: INFUSION SERIES
Discharge: HOME | End: 2018-02-08
Attending: INTERNAL MEDICINE | Admitting: INTERNAL MEDICINE

## 2018-02-08 NOTE — PROGRESS NOTES
PATIENTS ONCOLOGY RECORD LOCATED IN [facility]      Subjective     Name:  KATJA MCMAHON     Date:  2018  Address:  [place] [address]  Home: [phone]  :  1950 AGE:  67 y.o.        RECORDS OBTAINED:  Patients Oncology Record is located in [facility]

## 2018-02-15 ENCOUNTER — HOSPITAL ENCOUNTER (OUTPATIENT)
Dept: ONCOLOGY | Facility: CLINIC | Age: 68
Setting detail: INFUSION SERIES
Discharge: HOME | End: 2018-02-15
Attending: INTERNAL MEDICINE | Admitting: INTERNAL MEDICINE

## 2018-02-15 ENCOUNTER — HOSPITAL ENCOUNTER (OUTPATIENT)
Dept: ONCOLOGY | Facility: HOSPITAL | Age: 68
Discharge: HOME | End: 2018-02-15
Attending: INTERNAL MEDICINE | Admitting: INTERNAL MEDICINE

## 2018-02-15 ENCOUNTER — CLINICAL SUPPORT (OUTPATIENT)
Dept: ONCOLOGY | Facility: HOSPITAL | Age: 68
End: 2018-02-15

## 2018-02-15 LAB
ALBUMIN SERPL-MCNC: 3.6 G/DL (ref 3.5–4.8)
ALBUMIN/GLOB SERPL: 1.4 {RATIO} (ref 1–1.7)
ALP SERPL-CCNC: 56 IU/L (ref 32–91)
ALT SERPL-CCNC: 43 IU/L (ref 14–54)
ANION GAP SERPL CALC-SCNC: 11.9 MMOL/L (ref 10–20)
AST SERPL-CCNC: 47 IU/L (ref 15–41)
BILIRUB SERPL-MCNC: 0.2 MG/DL (ref 0.3–1.2)
BUN SERPL-MCNC: 11 MG/DL (ref 8–20)
BUN/CREAT SERPL: 18.3 (ref 5.4–26.2)
CALCIUM SERPL-MCNC: 9 MG/DL (ref 8.9–10.3)
CHLORIDE SERPL-SCNC: 103 MMOL/L (ref 101–111)
CONV CO2: 25 MMOL/L (ref 22–32)
CONV TOTAL PROTEIN: 6.1 G/DL (ref 6.1–7.9)
CREAT UR-MCNC: 0.6 MG/DL (ref 0.4–1)
GLOBULIN UR ELPH-MCNC: 2.5 G/DL (ref 2.5–3.8)
GLUCOSE SERPL-MCNC: 110 MG/DL (ref 65–99)
POTASSIUM SERPL-SCNC: 3.9 MMOL/L (ref 3.6–5.1)
SODIUM SERPL-SCNC: 136 MMOL/L (ref 136–144)

## 2018-02-15 NOTE — PROGRESS NOTES
PATIENTS ONCOLOGY RECORD LOCATED IN [employer]      Subjective     Name:  KATJA MCMAHON     Date:  02/15/2018  Address:  [place] [address]  Home: [phone]  :  1950 AGE:  67 y.o.        RECORDS OBTAINED:  Patients Oncology Record is located in [facility]

## 2018-02-22 ENCOUNTER — CLINICAL SUPPORT (OUTPATIENT)
Dept: ONCOLOGY | Facility: HOSPITAL | Age: 68
End: 2018-02-22

## 2018-02-22 ENCOUNTER — HOSPITAL ENCOUNTER (OUTPATIENT)
Dept: ONCOLOGY | Facility: CLINIC | Age: 68
Setting detail: INFUSION SERIES
Discharge: HOME | End: 2018-02-22
Attending: INTERNAL MEDICINE | Admitting: INTERNAL MEDICINE

## 2018-02-22 NOTE — PROGRESS NOTES
PATIENTS ONCOLOGY RECORD LOCATED IN [employer]      Subjective     Name:  KATJA MCMAHON     Date:  2018  Address:  [place] [address]  Home: [phone]  :  1950 AGE:  67 y.o.        RECORDS OBTAINED:  Patients Oncology Record is located in [facility]

## 2018-02-27 ENCOUNTER — CLINICAL SUPPORT (OUTPATIENT)
Dept: ONCOLOGY | Facility: HOSPITAL | Age: 68
End: 2018-02-27

## 2018-02-27 ENCOUNTER — HOSPITAL ENCOUNTER (OUTPATIENT)
Dept: ONCOLOGY | Facility: CLINIC | Age: 68
Setting detail: INFUSION SERIES
Discharge: HOME | End: 2018-02-27
Attending: INTERNAL MEDICINE | Admitting: INTERNAL MEDICINE

## 2018-02-27 NOTE — PROGRESS NOTES
PATIENTS ONCOLOGY RECORD LOCATED IN [employer]      Subjective     Name:  KATJA MCMAHON     Date:  2018  Address:  [place] [address]  Home: [phone]  :  1950 AGE:  67 y.o.        RECORDS OBTAINED:  Patients Oncology Record is located in [facility]

## 2018-03-07 ENCOUNTER — HOSPITAL ENCOUNTER (OUTPATIENT)
Dept: RADIATION ONCOLOGY | Facility: HOSPITAL | Age: 68
Setting detail: RECURRING SERIES
Discharge: HOME | End: 2018-06-24
Attending: RADIOLOGY | Admitting: RADIOLOGY

## 2018-03-07 ENCOUNTER — CLINICAL SUPPORT (OUTPATIENT)
Dept: ONCOLOGY | Facility: HOSPITAL | Age: 68
End: 2018-03-07

## 2018-03-07 ENCOUNTER — HOSPITAL ENCOUNTER (OUTPATIENT)
Dept: ONCOLOGY | Facility: CLINIC | Age: 68
Setting detail: INFUSION SERIES
Discharge: HOME | End: 2018-03-07
Attending: INTERNAL MEDICINE | Admitting: INTERNAL MEDICINE

## 2018-03-07 NOTE — PROGRESS NOTES
PATIENTS ONCOLOGY RECORD LOCATED IN [employer]      Subjective     Name:  KATJA MCMAHON     Date:  2018  Address:  [place] [address]  Home: [phone]  :  1950 AGE:  67 y.o.        RECORDS OBTAINED:  Patients Oncology Record is located in [facility]

## 2018-03-14 ENCOUNTER — CLINICAL SUPPORT (OUTPATIENT)
Dept: ONCOLOGY | Facility: HOSPITAL | Age: 68
End: 2018-03-14

## 2018-03-14 ENCOUNTER — HOSPITAL ENCOUNTER (OUTPATIENT)
Dept: ONCOLOGY | Facility: CLINIC | Age: 68
Setting detail: INFUSION SERIES
Discharge: HOME | End: 2018-03-14
Attending: INTERNAL MEDICINE | Admitting: INTERNAL MEDICINE

## 2018-03-14 NOTE — PROGRESS NOTES
PATIENTS ONCOLOGY RECORD LOCATED IN [employer]      Subjective     Name:  KATJA MCMAHON     Date:  2018  Address:  [place] [address]  Home: [phone]  :  1950 AGE:  67 y.o.        RECORDS OBTAINED:  Patients Oncology Record is located in [facility]

## 2018-03-27 ENCOUNTER — LAB REQUISITION (OUTPATIENT)
Dept: LAB | Facility: HOSPITAL | Age: 68
End: 2018-03-27

## 2018-03-27 DIAGNOSIS — D49.0 NEOPLASM OF DIGESTIVE SYSTEM: ICD-10-CM

## 2018-03-27 PROCEDURE — 88309 TISSUE EXAM BY PATHOLOGIST: CPT | Performed by: OTOLARYNGOLOGY

## 2018-03-28 ENCOUNTER — HOSPITAL ENCOUNTER (OUTPATIENT)
Dept: RADIATION ONCOLOGY | Facility: HOSPITAL | Age: 68
Discharge: HOME | End: 2018-03-28
Attending: RADIOLOGY | Admitting: RADIOLOGY

## 2018-03-29 LAB
CYTO UR: NORMAL
LAB AP CASE REPORT: NORMAL
LAB AP CLINICAL INFORMATION: NORMAL
Lab: NORMAL
PATH REPORT.FINAL DX SPEC: NORMAL
PATH REPORT.GROSS SPEC: NORMAL

## 2018-04-13 ENCOUNTER — CLINICAL SUPPORT (OUTPATIENT)
Dept: ONCOLOGY | Facility: HOSPITAL | Age: 68
End: 2018-04-13

## 2018-04-13 ENCOUNTER — HOSPITAL ENCOUNTER (OUTPATIENT)
Dept: ONCOLOGY | Facility: CLINIC | Age: 68
Setting detail: INFUSION SERIES
Discharge: HOME | End: 2018-04-13
Attending: INTERNAL MEDICINE | Admitting: INTERNAL MEDICINE

## 2018-04-13 NOTE — PROGRESS NOTES
PATIENTS ONCOLOGY RECORD LOCATED IN [facility]      Subjective     Name:  KATJA MCMAHON     Date:  2018  Address:  [place] [address]  Home: [phone]  :  1950 AGE:  67 y.o.        RECORDS OBTAINED:  Patients Oncology Record is located in [facility]

## 2018-05-17 ENCOUNTER — HOSPITAL ENCOUNTER (OUTPATIENT)
Dept: ONCOLOGY | Facility: CLINIC | Age: 68
Setting detail: INFUSION SERIES
Discharge: HOME | End: 2018-05-17
Attending: INTERNAL MEDICINE | Admitting: INTERNAL MEDICINE

## 2018-05-17 ENCOUNTER — CLINICAL SUPPORT (OUTPATIENT)
Dept: ONCOLOGY | Facility: HOSPITAL | Age: 68
End: 2018-05-17

## 2018-05-17 NOTE — PROGRESS NOTES
PATIENTS ONCOLOGY RECORD LOCATED IN [facility]      Subjective     Name:  KATJA MCMAHON     Date:  2018  Address:  [place] [address]  Home: [phone]  :  1950 AGE:  67 y.o.        RECORDS OBTAINED:  Patients Oncology Record is located in [facility]

## 2018-05-23 ENCOUNTER — APPOINTMENT (OUTPATIENT)
Dept: PREADMISSION TESTING | Facility: HOSPITAL | Age: 68
End: 2018-05-23

## 2018-05-23 VITALS
HEIGHT: 65 IN | TEMPERATURE: 98.5 F | BODY MASS INDEX: 21.99 KG/M2 | DIASTOLIC BLOOD PRESSURE: 83 MMHG | WEIGHT: 132 LBS | SYSTOLIC BLOOD PRESSURE: 127 MMHG | RESPIRATION RATE: 20 BRPM | HEART RATE: 101 BPM | OXYGEN SATURATION: 97 %

## 2018-05-23 LAB
ANION GAP SERPL CALCULATED.3IONS-SCNC: 14.1 MMOL/L
BUN BLD-MCNC: 13 MG/DL (ref 8–23)
BUN/CREAT SERPL: 19.1 (ref 7–25)
CALCIUM SPEC-SCNC: 9.8 MG/DL (ref 8.6–10.5)
CHLORIDE SERPL-SCNC: 100 MMOL/L (ref 98–107)
CO2 SERPL-SCNC: 25.9 MMOL/L (ref 22–29)
CREAT BLD-MCNC: 0.68 MG/DL (ref 0.57–1)
DEPRECATED RDW RBC AUTO: 40.5 FL (ref 37–54)
ERYTHROCYTE [DISTWIDTH] IN BLOOD BY AUTOMATED COUNT: 12.7 % (ref 11.7–13)
GFR SERPL CREATININE-BSD FRML MDRD: 87 ML/MIN/1.73
GLUCOSE BLD-MCNC: 100 MG/DL (ref 65–99)
HCT VFR BLD AUTO: 38.8 % (ref 35.6–45.5)
HGB BLD-MCNC: 12.7 G/DL (ref 11.9–15.5)
MCH RBC QN AUTO: 28.5 PG (ref 26.9–32)
MCHC RBC AUTO-ENTMCNC: 32.7 G/DL (ref 32.4–36.3)
MCV RBC AUTO: 87 FL (ref 80.5–98.2)
PLATELET # BLD AUTO: 211 10*3/MM3 (ref 140–500)
PMV BLD AUTO: 9.6 FL (ref 6–12)
POTASSIUM BLD-SCNC: 4.1 MMOL/L (ref 3.5–5.2)
RBC # BLD AUTO: 4.46 10*6/MM3 (ref 3.9–5.2)
SODIUM BLD-SCNC: 140 MMOL/L (ref 136–145)
WBC NRBC COR # BLD: 4.24 10*3/MM3 (ref 4.5–10.7)

## 2018-05-23 PROCEDURE — 80048 BASIC METABOLIC PNL TOTAL CA: CPT | Performed by: OTOLARYNGOLOGY

## 2018-05-23 PROCEDURE — 36415 COLL VENOUS BLD VENIPUNCTURE: CPT

## 2018-05-23 PROCEDURE — 85027 COMPLETE CBC AUTOMATED: CPT | Performed by: OTOLARYNGOLOGY

## 2018-05-23 PROCEDURE — 93005 ELECTROCARDIOGRAM TRACING: CPT

## 2018-05-23 PROCEDURE — 93010 ELECTROCARDIOGRAM REPORT: CPT | Performed by: INTERNAL MEDICINE

## 2018-05-23 RX ORDER — LISINOPRIL 40 MG/1
40 TABLET ORAL EVERY MORNING
COMMUNITY

## 2018-05-23 RX ORDER — HYDROCODONE BITARTRATE AND ACETAMINOPHEN 5; 325 MG/1; MG/1
1-2 TABLET ORAL 2 TIMES DAILY PRN
Status: ON HOLD | COMMUNITY
Start: 2018-04-18 | End: 2018-05-31

## 2018-05-23 RX ORDER — AMLODIPINE BESYLATE 5 MG/1
5 TABLET ORAL EVERY MORNING
COMMUNITY
End: 2020-01-25 | Stop reason: HOSPADM

## 2018-05-23 RX ORDER — PROMETHAZINE HYDROCHLORIDE 25 MG/1
25 TABLET ORAL EVERY 6 HOURS PRN
Status: ON HOLD | COMMUNITY
Start: 2018-04-18 | End: 2018-05-31

## 2018-05-23 NOTE — DISCHARGE INSTRUCTIONS
Take the following medications the morning of surgery with a small sip of water:        General Instructions:  • Do not eat solid food after midnight the night before surgery.  • You may drink clear liquids day of surgery but must stop at least one hour before your hospital arrival time.  • It is beneficial for you to have a clear drink that contains carbohydrates the day of surgery.  We suggest a 12 to 20 ounce bottle of Gatorade or Powerade for non-diabetic patients or a 12 to 20 ounce bottle of G2 or Powerade Zero for diabetic patients. (Pediatric patients, are not advised to drink a 12 to 20 ounce carbohydrate drink)    Clear liquids are liquids you can see through.  Nothing red in color.     Plain water                               Sports drinks  Sodas                                   Gelatin (Jell-O)  Fruit juices without pulp such as white grape juice and apple juice  Popsicles that contain no fruit or yogurt  Tea or coffee (no cream or milk added)  Gatorade / Powerade  G2 / Powerade Zero    • Infants may have breast milk up to four hours before surgery.  • Infants drinking formula may drink formula up to six hours before surgery.   • Patients who avoid smoking, chewing tobacco and alcohol for 4 weeks prior to surgery have a reduced risk of post-operative complications.  Quit smoking as many days before surgery as you can.  • Do not smoke, use chewing tobacco or drink alcohol the day of surgery.   • If applicable bring your C-PAP/ BI-PAP machine.  • Bring any papers given to you in the doctor’s office.  • Wear clean comfortable clothes and socks.  • Do not wear contact lenses or make-up.  Bring a case for your glasses.   • Bring crutches or walker if applicable.  • Remove all piercings.  Leave jewelry and any other valuables at home.  • Hair extensions with metal clips must be removed prior to surgery.  • The Pre-Admission Testing nurse will instruct you to bring medications if unable to obtain an accurate  list in Pre-Admission Testing.        If you were given a blood bank ID arm band remember to bring it with you the day of surgery.    Preventing a Surgical Site Infection:  • For 2 to 3 days before surgery, avoid shaving with a razor because the razor can irritate skin and make it easier to develop an infection.  • The night prior to surgery sleep in a clean bed with clean clothing.  Do not allow pets to sleep with you.  • Shower on the morning of surgery using a fresh bar of anti-bacterial soap (such as Dial) and clean washcloth.  Dry with a clean towel and dress in clean clothing.  • Ask your surgeon if you will be receiving antibiotics prior to surgery.  • Make sure you, your family, and all healthcare providers clean their hands with soap and water or an alcohol based hand  before caring for you or your wound.    Day of surgery:5/31/18  Hospital to contact with time of arrivalU  Upon arrival, a Pre-op nurse and Anesthesiologist will review your health history, obtain vital signs, and answer questions you may have.  The only belongings needed at this time will be your home medications and if applicable your C-PAP/BI-PAP machine.  If you are staying overnight your family can leave the rest of your belongings in the car and bring them to your room later.  A Pre-op nurse will start an IV and you may receive medication in preparation for surgery, including something to help you relax.  Your family will be able to see you in the Pre-op area.  While you are in surgery your family should notify the waiting room  if they leave the waiting room area and provide a contact phone number.    Please be aware that surgery does come with discomfort.  We want to make every effort to control your discomfort so please discuss any uncontrolled symptoms with your nurse.   Your doctor will most likely have prescribed pain medications.      If you are going home after surgery you will receive individualized written  care instructions before being discharged.  A responsible adult must drive you to and from the hospital on the day of your surgery and stay with you for 24 hours.    If you are staying overnight following surgery, you will be transported to your hospital room following the recovery period.  [facility] has all private rooms.    If you have any questions please call Pre-Admission Testing at [phone].  Deductibles and co-payments are collected on the day of service. Please be prepared to pay the required co-pay, deductible or deposit on the day of service as defined by your plan.

## 2018-05-31 ENCOUNTER — ANESTHESIA EVENT (OUTPATIENT)
Dept: PERIOP | Facility: HOSPITAL | Age: 68
End: 2018-05-31

## 2018-05-31 ENCOUNTER — HOSPITAL ENCOUNTER (OUTPATIENT)
Facility: HOSPITAL | Age: 68
Discharge: HOME | End: 2018-06-01
Attending: OTOLARYNGOLOGY | Admitting: OTOLARYNGOLOGY

## 2018-05-31 ENCOUNTER — ANESTHESIA (OUTPATIENT)
Dept: PERIOP | Facility: HOSPITAL | Age: 68
End: 2018-05-31

## 2018-05-31 DIAGNOSIS — C73 THYROID CANCER (HCC): ICD-10-CM

## 2018-05-31 DIAGNOSIS — C02.9 TONGUE CANCER (HCC): ICD-10-CM

## 2018-05-31 PROCEDURE — 25010000002 FENTANYL CITRATE (PF) 100 MCG/2ML SOLUTION: Performed by: NURSE ANESTHETIST, CERTIFIED REGISTERED

## 2018-05-31 PROCEDURE — A9270 NON-COVERED ITEM OR SERVICE: HCPCS | Performed by: OTOLARYNGOLOGY

## 2018-05-31 PROCEDURE — 88307 TISSUE EXAM BY PATHOLOGIST: CPT | Performed by: OTOLARYNGOLOGY

## 2018-05-31 PROCEDURE — A9270 NON-COVERED ITEM OR SERVICE: HCPCS | Performed by: ANESTHESIOLOGY

## 2018-05-31 PROCEDURE — 63710000001 BACITRACIN 500 UNIT/GM OINTMENT 3.5 G TUBE: Performed by: ANESTHESIOLOGY

## 2018-05-31 PROCEDURE — 25010000002 ONDANSETRON PER 1 MG: Performed by: NURSE ANESTHETIST, CERTIFIED REGISTERED

## 2018-05-31 PROCEDURE — 25010000002 DEXAMETHASONE PER 1 MG: Performed by: NURSE ANESTHETIST, CERTIFIED REGISTERED

## 2018-05-31 PROCEDURE — 25010000002 PROPOFOL 10 MG/ML EMULSION: Performed by: NURSE ANESTHETIST, CERTIFIED REGISTERED

## 2018-05-31 PROCEDURE — G0378 HOSPITAL OBSERVATION PER HR: HCPCS

## 2018-05-31 PROCEDURE — 25010000002 PHENYLEPHRINE PER 1 ML: Performed by: NURSE ANESTHETIST, CERTIFIED REGISTERED

## 2018-05-31 PROCEDURE — 63710000001 KETOROLAC 0.5 % SOLUTION 5 ML BOTTLE: Performed by: ANESTHESIOLOGY

## 2018-05-31 PROCEDURE — 63710000001 HYDROCODONE-ACETAMINOPHEN 7.5-325 MG TABLET: Performed by: OTOLARYNGOLOGY

## 2018-05-31 PROCEDURE — 25010000002 MIDAZOLAM PER 1 MG: Performed by: ANESTHESIOLOGY

## 2018-05-31 RX ORDER — SODIUM CHLORIDE, SODIUM LACTATE, POTASSIUM CHLORIDE, CALCIUM CHLORIDE 600; 310; 30; 20 MG/100ML; MG/100ML; MG/100ML; MG/100ML
9 INJECTION, SOLUTION INTRAVENOUS CONTINUOUS
Status: DISCONTINUED | OUTPATIENT
Start: 2018-05-31 | End: 2018-05-31

## 2018-05-31 RX ORDER — LABETALOL HYDROCHLORIDE 5 MG/ML
5 INJECTION, SOLUTION INTRAVENOUS
Status: DISCONTINUED | OUTPATIENT
Start: 2018-05-31 | End: 2018-05-31 | Stop reason: HOSPADM

## 2018-05-31 RX ORDER — PROMETHAZINE HYDROCHLORIDE 25 MG/1
25 SUPPOSITORY RECTAL EVERY 6 HOURS PRN
Status: DISCONTINUED | OUTPATIENT
Start: 2018-05-31 | End: 2018-06-01 | Stop reason: HOSPADM

## 2018-05-31 RX ORDER — ONDANSETRON 2 MG/ML
INJECTION INTRAMUSCULAR; INTRAVENOUS AS NEEDED
Status: DISCONTINUED | OUTPATIENT
Start: 2018-05-31 | End: 2018-05-31 | Stop reason: SURG

## 2018-05-31 RX ORDER — BACITRACIN 500 [USP'U]/G
1 OINTMENT OPHTHALMIC ONCE
Status: COMPLETED | OUTPATIENT
Start: 2018-05-31 | End: 2018-05-31

## 2018-05-31 RX ORDER — BACITRACIN ZINC 500 [USP'U]/G
OINTMENT TOPICAL AS NEEDED
Status: DISCONTINUED | OUTPATIENT
Start: 2018-05-31 | End: 2018-05-31 | Stop reason: HOSPADM

## 2018-05-31 RX ORDER — PROMETHAZINE HYDROCHLORIDE 25 MG/1
25 TABLET ORAL EVERY 6 HOURS PRN
Status: DISCONTINUED | OUTPATIENT
Start: 2018-05-31 | End: 2018-06-01 | Stop reason: HOSPADM

## 2018-05-31 RX ORDER — ONDANSETRON 2 MG/ML
4 INJECTION INTRAMUSCULAR; INTRAVENOUS ONCE AS NEEDED
Status: DISCONTINUED | OUTPATIENT
Start: 2018-05-31 | End: 2018-05-31 | Stop reason: HOSPADM

## 2018-05-31 RX ORDER — PROMETHAZINE HYDROCHLORIDE 25 MG/1
25 TABLET ORAL ONCE AS NEEDED
Status: DISCONTINUED | OUTPATIENT
Start: 2018-05-31 | End: 2018-05-31 | Stop reason: HOSPADM

## 2018-05-31 RX ORDER — NALOXONE HCL 0.4 MG/ML
0.4 VIAL (ML) INJECTION
Status: DISCONTINUED | OUTPATIENT
Start: 2018-05-31 | End: 2018-06-01 | Stop reason: HOSPADM

## 2018-05-31 RX ORDER — PROMETHAZINE HYDROCHLORIDE 25 MG/ML
12.5 INJECTION, SOLUTION INTRAMUSCULAR; INTRAVENOUS EVERY 6 HOURS PRN
Status: DISCONTINUED | OUTPATIENT
Start: 2018-05-31 | End: 2018-06-01 | Stop reason: HOSPADM

## 2018-05-31 RX ORDER — MAGNESIUM HYDROXIDE 1200 MG/15ML
LIQUID ORAL AS NEEDED
Status: DISCONTINUED | OUTPATIENT
Start: 2018-05-31 | End: 2018-05-31 | Stop reason: HOSPADM

## 2018-05-31 RX ORDER — KETOROLAC TROMETHAMINE 5 MG/ML
1 SOLUTION OPHTHALMIC ONCE
Status: COMPLETED | OUTPATIENT
Start: 2018-05-31 | End: 2018-05-31

## 2018-05-31 RX ORDER — LEVOTHYROXINE SODIUM 112 UG/1
175 TABLET ORAL DAILY
COMMUNITY
End: 2019-06-16

## 2018-05-31 RX ORDER — PROMETHAZINE HYDROCHLORIDE 25 MG/ML
12.5 INJECTION, SOLUTION INTRAMUSCULAR; INTRAVENOUS ONCE AS NEEDED
Status: DISCONTINUED | OUTPATIENT
Start: 2018-05-31 | End: 2018-05-31 | Stop reason: HOSPADM

## 2018-05-31 RX ORDER — SODIUM CHLORIDE 0.9 % (FLUSH) 0.9 %
1-10 SYRINGE (ML) INJECTION AS NEEDED
Status: DISCONTINUED | OUTPATIENT
Start: 2018-05-31 | End: 2018-06-01 | Stop reason: HOSPADM

## 2018-05-31 RX ORDER — HYDROMORPHONE HYDROCHLORIDE 1 MG/ML
0.5 INJECTION, SOLUTION INTRAMUSCULAR; INTRAVENOUS; SUBCUTANEOUS
Status: DISCONTINUED | OUTPATIENT
Start: 2018-05-31 | End: 2018-05-31 | Stop reason: HOSPADM

## 2018-05-31 RX ORDER — PROPARACAINE HYDROCHLORIDE 5 MG/ML
2 SOLUTION/ DROPS OPHTHALMIC ONCE
Status: COMPLETED | OUTPATIENT
Start: 2018-05-31 | End: 2018-05-31

## 2018-05-31 RX ORDER — LIDOCAINE HYDROCHLORIDE 20 MG/ML
INJECTION, SOLUTION INFILTRATION; PERINEURAL AS NEEDED
Status: DISCONTINUED | OUTPATIENT
Start: 2018-05-31 | End: 2018-05-31 | Stop reason: SURG

## 2018-05-31 RX ORDER — LIDOCAINE HYDROCHLORIDE 10 MG/ML
0.5 INJECTION, SOLUTION EPIDURAL; INFILTRATION; INTRACAUDAL; PERINEURAL ONCE AS NEEDED
Status: DISCONTINUED | OUTPATIENT
Start: 2018-05-31 | End: 2018-05-31 | Stop reason: HOSPADM

## 2018-05-31 RX ORDER — ROCURONIUM BROMIDE 10 MG/ML
INJECTION, SOLUTION INTRAVENOUS AS NEEDED
Status: DISCONTINUED | OUTPATIENT
Start: 2018-05-31 | End: 2018-05-31 | Stop reason: SURG

## 2018-05-31 RX ORDER — MIDAZOLAM HYDROCHLORIDE 1 MG/ML
2 INJECTION INTRAMUSCULAR; INTRAVENOUS
Status: DISCONTINUED | OUTPATIENT
Start: 2018-05-31 | End: 2018-05-31 | Stop reason: HOSPADM

## 2018-05-31 RX ORDER — MIDAZOLAM HYDROCHLORIDE 1 MG/ML
1 INJECTION INTRAMUSCULAR; INTRAVENOUS
Status: DISCONTINUED | OUTPATIENT
Start: 2018-05-31 | End: 2018-05-31 | Stop reason: HOSPADM

## 2018-05-31 RX ORDER — PROPARACAINE HYDROCHLORIDE 5 MG/ML
SOLUTION/ DROPS OPHTHALMIC ONCE
Status: CANCELLED | OUTPATIENT
Start: 2018-05-31 | End: 2018-05-31

## 2018-05-31 RX ORDER — HYDRALAZINE HYDROCHLORIDE 20 MG/ML
5 INJECTION INTRAMUSCULAR; INTRAVENOUS
Status: DISCONTINUED | OUTPATIENT
Start: 2018-05-31 | End: 2018-05-31 | Stop reason: HOSPADM

## 2018-05-31 RX ORDER — HYDROCODONE BITARTRATE AND ACETAMINOPHEN 7.5; 325 MG/1; MG/1
1 TABLET ORAL EVERY 4 HOURS PRN
Status: DISCONTINUED | OUTPATIENT
Start: 2018-05-31 | End: 2018-06-01 | Stop reason: HOSPADM

## 2018-05-31 RX ORDER — FENTANYL CITRATE 50 UG/ML
INJECTION, SOLUTION INTRAMUSCULAR; INTRAVENOUS AS NEEDED
Status: DISCONTINUED | OUTPATIENT
Start: 2018-05-31 | End: 2018-05-31 | Stop reason: SURG

## 2018-05-31 RX ORDER — LEVOTHYROXINE SODIUM 112 UG/1
112 TABLET ORAL DAILY
Status: DISCONTINUED | OUTPATIENT
Start: 2018-05-31 | End: 2018-06-01 | Stop reason: HOSPADM

## 2018-05-31 RX ORDER — FENTANYL CITRATE 50 UG/ML
50 INJECTION, SOLUTION INTRAMUSCULAR; INTRAVENOUS
Status: DISCONTINUED | OUTPATIENT
Start: 2018-05-31 | End: 2018-05-31 | Stop reason: HOSPADM

## 2018-05-31 RX ORDER — DEXAMETHASONE SODIUM PHOSPHATE 10 MG/ML
INJECTION INTRAMUSCULAR; INTRAVENOUS AS NEEDED
Status: DISCONTINUED | OUTPATIENT
Start: 2018-05-31 | End: 2018-05-31 | Stop reason: SURG

## 2018-05-31 RX ORDER — FAMOTIDINE 10 MG/ML
20 INJECTION, SOLUTION INTRAVENOUS ONCE
Status: COMPLETED | OUTPATIENT
Start: 2018-05-31 | End: 2018-05-31

## 2018-05-31 RX ORDER — HYDROCODONE BITARTRATE AND ACETAMINOPHEN 7.5; 325 MG/1; MG/1
1 TABLET ORAL ONCE AS NEEDED
Status: DISCONTINUED | OUTPATIENT
Start: 2018-05-31 | End: 2018-05-31 | Stop reason: HOSPADM

## 2018-05-31 RX ORDER — PROMETHAZINE HYDROCHLORIDE 25 MG/1
12.5 TABLET ORAL ONCE AS NEEDED
Status: DISCONTINUED | OUTPATIENT
Start: 2018-05-31 | End: 2018-05-31 | Stop reason: HOSPADM

## 2018-05-31 RX ORDER — BACITRACIN 500 [USP'U]/G
OINTMENT OPHTHALMIC EVERY 4 HOURS PRN
Status: CANCELLED | OUTPATIENT
Start: 2018-05-31

## 2018-05-31 RX ORDER — KETOROLAC TROMETHAMINE 5 MG/ML
SOLUTION OPHTHALMIC EVERY 4 HOURS PRN
Status: CANCELLED | OUTPATIENT
Start: 2018-05-31

## 2018-05-31 RX ORDER — SODIUM CHLORIDE 0.9 % (FLUSH) 0.9 %
1-10 SYRINGE (ML) INJECTION AS NEEDED
Status: DISCONTINUED | OUTPATIENT
Start: 2018-05-31 | End: 2018-05-31 | Stop reason: HOSPADM

## 2018-05-31 RX ORDER — NALOXONE HCL 0.4 MG/ML
0.2 VIAL (ML) INJECTION AS NEEDED
Status: DISCONTINUED | OUTPATIENT
Start: 2018-05-31 | End: 2018-05-31 | Stop reason: HOSPADM

## 2018-05-31 RX ORDER — LISINOPRIL 40 MG/1
40 TABLET ORAL EVERY MORNING
Status: DISCONTINUED | OUTPATIENT
Start: 2018-05-31 | End: 2018-06-01 | Stop reason: HOSPADM

## 2018-05-31 RX ORDER — PROPOFOL 10 MG/ML
VIAL (ML) INTRAVENOUS AS NEEDED
Status: DISCONTINUED | OUTPATIENT
Start: 2018-05-31 | End: 2018-05-31 | Stop reason: SURG

## 2018-05-31 RX ORDER — AMLODIPINE BESYLATE 5 MG/1
5 TABLET ORAL EVERY MORNING
Status: DISCONTINUED | OUTPATIENT
Start: 2018-05-31 | End: 2018-06-01 | Stop reason: HOSPADM

## 2018-05-31 RX ORDER — OXYCODONE AND ACETAMINOPHEN 7.5; 325 MG/1; MG/1
1 TABLET ORAL ONCE AS NEEDED
Status: DISCONTINUED | OUTPATIENT
Start: 2018-05-31 | End: 2018-05-31 | Stop reason: HOSPADM

## 2018-05-31 RX ORDER — PROMETHAZINE HYDROCHLORIDE 25 MG/1
25 SUPPOSITORY RECTAL ONCE AS NEEDED
Status: DISCONTINUED | OUTPATIENT
Start: 2018-05-31 | End: 2018-05-31 | Stop reason: HOSPADM

## 2018-05-31 RX ORDER — EPHEDRINE SULFATE 50 MG/ML
5 INJECTION, SOLUTION INTRAVENOUS ONCE AS NEEDED
Status: DISCONTINUED | OUTPATIENT
Start: 2018-05-31 | End: 2018-05-31 | Stop reason: HOSPADM

## 2018-05-31 RX ORDER — LIDOCAINE HYDROCHLORIDE AND EPINEPHRINE 10; 10 MG/ML; UG/ML
INJECTION, SOLUTION INFILTRATION; PERINEURAL AS NEEDED
Status: DISCONTINUED | OUTPATIENT
Start: 2018-05-31 | End: 2018-05-31 | Stop reason: HOSPADM

## 2018-05-31 RX ORDER — DIPHENHYDRAMINE HYDROCHLORIDE 50 MG/ML
12.5 INJECTION INTRAMUSCULAR; INTRAVENOUS
Status: DISCONTINUED | OUTPATIENT
Start: 2018-05-31 | End: 2018-05-31 | Stop reason: HOSPADM

## 2018-05-31 RX ORDER — FLUMAZENIL 0.1 MG/ML
0.2 INJECTION INTRAVENOUS AS NEEDED
Status: DISCONTINUED | OUTPATIENT
Start: 2018-05-31 | End: 2018-05-31 | Stop reason: HOSPADM

## 2018-05-31 RX ADMIN — HYDROCODONE BITARTRATE AND ACETAMINOPHEN 0.5 TABLET: 7.5; 325 TABLET ORAL at 19:37

## 2018-05-31 RX ADMIN — FENTANYL CITRATE 50 MCG: 50 INJECTION, SOLUTION INTRAMUSCULAR; INTRAVENOUS at 08:29

## 2018-05-31 RX ADMIN — PROPARACAINE HYDROCHLORIDE 2 DROP: 5 SOLUTION/ DROPS OPHTHALMIC at 15:38

## 2018-05-31 RX ADMIN — PHENYLEPHRINE HYDROCHLORIDE 100 MCG: 10 INJECTION INTRAVENOUS at 08:40

## 2018-05-31 RX ADMIN — KETOROLAC TROMETHAMINE 1 DROP: 5 SOLUTION/ DROPS OPHTHALMIC at 15:47

## 2018-05-31 RX ADMIN — ONDANSETRON 4 MG: 2 INJECTION INTRAMUSCULAR; INTRAVENOUS at 07:46

## 2018-05-31 RX ADMIN — SODIUM CHLORIDE, POTASSIUM CHLORIDE, SODIUM LACTATE AND CALCIUM CHLORIDE 9 ML/HR: 600; 310; 30; 20 INJECTION, SOLUTION INTRAVENOUS at 06:50

## 2018-05-31 RX ADMIN — PHENYLEPHRINE HYDROCHLORIDE 100 MCG: 10 INJECTION INTRAVENOUS at 10:13

## 2018-05-31 RX ADMIN — PHENYLEPHRINE HYDROCHLORIDE 100 MCG: 10 INJECTION INTRAVENOUS at 08:10

## 2018-05-31 RX ADMIN — FENTANYL CITRATE 50 MCG: 50 INJECTION, SOLUTION INTRAMUSCULAR; INTRAVENOUS at 08:05

## 2018-05-31 RX ADMIN — FENTANYL CITRATE 50 MCG: 50 INJECTION, SOLUTION INTRAMUSCULAR; INTRAVENOUS at 09:40

## 2018-05-31 RX ADMIN — PHENYLEPHRINE HYDROCHLORIDE 100 MCG: 10 INJECTION INTRAVENOUS at 09:45

## 2018-05-31 RX ADMIN — BACITRACIN 1 APPLICATION: 500 OINTMENT OPHTHALMIC at 15:47

## 2018-05-31 RX ADMIN — PHENYLEPHRINE HYDROCHLORIDE 100 MCG: 10 INJECTION INTRAVENOUS at 07:55

## 2018-05-31 RX ADMIN — PROPOFOL 150 MG: 10 INJECTION, EMULSION INTRAVENOUS at 07:35

## 2018-05-31 RX ADMIN — ROCURONIUM BROMIDE 30 MG: 10 INJECTION INTRAVENOUS at 07:35

## 2018-05-31 RX ADMIN — LIDOCAINE HYDROCHLORIDE 60 MG: 20 INJECTION, SOLUTION INFILTRATION; PERINEURAL at 07:35

## 2018-05-31 RX ADMIN — SODIUM CHLORIDE, POTASSIUM CHLORIDE, SODIUM LACTATE AND CALCIUM CHLORIDE: 600; 310; 30; 20 INJECTION, SOLUTION INTRAVENOUS at 10:01

## 2018-05-31 RX ADMIN — FENTANYL CITRATE 50 MCG: 50 INJECTION, SOLUTION INTRAMUSCULAR; INTRAVENOUS at 07:35

## 2018-05-31 RX ADMIN — SUGAMMADEX 120 MG: 100 INJECTION, SOLUTION INTRAVENOUS at 08:05

## 2018-05-31 RX ADMIN — FAMOTIDINE 20 MG: 10 INJECTION INTRAVENOUS at 06:50

## 2018-05-31 RX ADMIN — FENTANYL CITRATE 50 MCG: 50 INJECTION, SOLUTION INTRAMUSCULAR; INTRAVENOUS at 08:20

## 2018-05-31 RX ADMIN — PHENYLEPHRINE HYDROCHLORIDE 100 MCG: 10 INJECTION INTRAVENOUS at 09:55

## 2018-05-31 RX ADMIN — MIDAZOLAM 2 MG: 1 INJECTION INTRAMUSCULAR; INTRAVENOUS at 06:50

## 2018-05-31 RX ADMIN — DEXAMETHASONE SODIUM PHOSPHATE 8 MG: 10 INJECTION INTRAMUSCULAR; INTRAVENOUS at 07:41

## 2018-05-31 RX ADMIN — HYDROCODONE BITARTRATE AND ACETAMINOPHEN 0.5 TABLET: 7.5; 325 TABLET ORAL at 17:58

## 2018-05-31 RX ADMIN — PHENYLEPHRINE HYDROCHLORIDE 100 MCG: 10 INJECTION INTRAVENOUS at 09:35

## 2018-05-31 RX ADMIN — SODIUM CHLORIDE, POTASSIUM CHLORIDE, SODIUM LACTATE AND CALCIUM CHLORIDE: 600; 310; 30; 20 INJECTION, SOLUTION INTRAVENOUS at 07:35

## 2018-05-31 NOTE — ANESTHESIA PREPROCEDURE EVALUATION
Anesthesia Evaluation     Patient summary reviewed and Nursing notes reviewed                Airway   Mallampati: II  Dental      Pulmonary - negative pulmonary ROS   Cardiovascular     ECG reviewed  Rhythm: regular  Rate: normal    (+) hypertension, valvular problems/murmurs,       Neuro/Psych- negative ROS  GI/Hepatic/Renal/Endo    (+)  GERD,      Musculoskeletal     Abdominal    Substance History - negative use     OB/GYN negative ob/gyn ROS         Other   (+) arthritis   history of cancer                    Anesthesia Plan    ASA 2     general     intravenous induction   Anesthetic plan and risks discussed with patient.

## 2018-05-31 NOTE — ANESTHESIA PROCEDURE NOTES
Airway  Urgency: elective    Date/Time: 5/31/2018 7:38 AM  Airway not difficult    General Information and Staff    Patient location during procedure: OR  Anesthesiologist: CRYSTAL BARRERA  CRNA: TUAN SIERRA    Indications and Patient Condition  Indications for airway management: airway protection    Preoxygenated: yes  MILS not maintained throughout  Mask difficulty assessment: 1 - vent by mask    Final Airway Details  Final airway type: endotracheal airway      Successful airway: ETT and NIM tube  Cuffed: yes   Successful intubation technique: direct laryngoscopy  Facilitating devices/methods: cricoid pressure  Endotracheal tube insertion site: oral  Blade: Yisel  Blade size: #3  ETT size: 7.0 mm  Cormack-Lehane Classification: grade I - full view of glottis  Placement verified by: chest auscultation and capnometry   Measured from: lips  ETT to lips (cm): 22  Number of attempts at approach: 1    Additional Comments  PreO2 100%, FEO2 >85, SIVI, easy BMV, sniff position, ETT placed/ confirmed, attraumatic, teeth and lips as preop.

## 2018-05-31 NOTE — ANESTHESIA POSTPROCEDURE EVALUATION
Patient: Elma Sheikh    Procedure Summary     Date:  05/31/18 Room / Location:   [facility] [address] [place] [address] [facility]    Anesthesia Start:  0731 Anesthesia Stop:  1049    Procedure:  RIGHT MODIFIED NECK DISSECTION (Right Neck) Diagnosis:      Surgeon:  Toan Reed MD Provider:  Fercho Dee MD    Anesthesia Type:  general ASA Status:  2          Anesthesia Type: general  Last vitals  BP   133/75 (05/31/18 1100)   Temp   37.3 °C (99.2 °F) (05/31/18 1047)   Pulse   107 (05/31/18 1100)   Resp   16 (05/31/18 1100)     SpO2   99 % (05/31/18 1100)     Post Anesthesia Care and Evaluation    Patient location during evaluation: bedside  Patient participation: complete - patient participated  Level of consciousness: awake and alert  Pain management: adequate  Airway patency: patent  Anesthetic complications: No anesthetic complications    Cardiovascular status: acceptable  Respiratory status: acceptable  Hydration status: acceptable    Comments: /75   Pulse 107   Temp 37.3 °C (99.2 °F) (Oral)   Resp 16   SpO2 99%

## 2018-06-01 VITALS
TEMPERATURE: 98.5 F | SYSTOLIC BLOOD PRESSURE: 129 MMHG | RESPIRATION RATE: 16 BRPM | DIASTOLIC BLOOD PRESSURE: 78 MMHG | HEART RATE: 101 BPM | OXYGEN SATURATION: 100 %

## 2018-06-01 LAB
CYTO UR: NORMAL
LAB AP CASE REPORT: NORMAL
Lab: NORMAL
PATH REPORT.FINAL DX SPEC: NORMAL
PATH REPORT.GROSS SPEC: NORMAL

## 2018-06-01 PROCEDURE — A9270 NON-COVERED ITEM OR SERVICE: HCPCS | Performed by: OTOLARYNGOLOGY

## 2018-06-01 PROCEDURE — G0378 HOSPITAL OBSERVATION PER HR: HCPCS

## 2018-06-01 PROCEDURE — 63710000001 AMLODIPINE 5 MG TABLET: Performed by: OTOLARYNGOLOGY

## 2018-06-01 PROCEDURE — 63710000001 LISINOPRIL 40 MG TABLET: Performed by: OTOLARYNGOLOGY

## 2018-06-01 RX ADMIN — LISINOPRIL 40 MG: 40 TABLET ORAL at 06:23

## 2018-06-01 RX ADMIN — AMLODIPINE BESYLATE 5 MG: 5 TABLET ORAL at 06:22

## 2018-06-01 NOTE — PROGRESS NOTES
"Discharge Planning Assessment  [facility]     Patient Name: Elma Sheikh  MRN: [id number]  Today's Date: 6/1/2018    Admit Date: 5/31/2018          Discharge Needs Assessment     Row Name 06/01/18 1402       Living Environment    Lives With spouse    Current Living Arrangements home/apartment/condo    Primary Care Provided by self    Provides Primary Care For no one    Family Caregiver if Needed spouse;child(adrianna), adult    Quality of Family Relationships supportive    Able to Return to Prior Arrangements yes       Resource/Environmental Concerns    Transportation Concerns car, none       Discharge Needs Assessment    Readmission Within the Last 30 Days no previous admission in last 30 days    Concerns to be Addressed no discharge needs identified;denies needs/concerns at this time;adjustment to diagnosis/illness    Equipment Currently Used at Home none    Anticipated Changes Related to Illness none    Equipment Needed After Discharge none    Offered/Gave Vendor List yes            Discharge Plan     Row Name 06/01/18 0651       Plan    Plan Comments I met aneta pt and her aunt Negar, pt declined offer to have her aunt and  added to her contact list, she said her dtr Adrienne will be sufficient. Pt confirmed the mailing address is correct, she said she lives in [place], [place] and her PCP Dr Escalante added to EPIC, pt said she can afford her Rx, uses no DME, has never had home health or been to a SNF and plans to return home at discharge and said she hopes it is today \"I feel good\", pt said she does nto anticipate any discharge needs.  Pt said she does nto have a POA, HCS or Living Will.     Final Discharge Disposition Code 01 - home or self-care    Row Name 06/01/18 9590       Plan    Plan Home w/o needs             Expected Discharge Date and Time     Expected Discharge Date Expected Discharge Time    Jun 1, 2018               Demographic Summary     Row Name 06/01/18 7826       General Information    " Admission Type observation    Arrived From home    Required Notices Provided Observation Status Notice    Referral Source admission list    Reason for Consult discharge planning            Functional Status     Row Name 06/01/18 1402       Functional Status, IADL    Medications independent    Meal Preparation independent    Housekeeping independent    Laundry independent    Shopping independent           Patient Forms     Row Name 06/01/18 1354       Patient Forms    Provider Choice List Delivered    Delivered to Patient    Method of delivery In person    Patient Observation Letter Delivered   I reviewed MOON Observation Letter with pt, she signed and     Delivered to Patient    Method of delivery In person          Norma Florian RN

## 2018-06-01 NOTE — PROGRESS NOTES
Postoperative day #1 following Right modified radical neck dissection.  She has been doing pretty well overall.  She is not having any voice problems or swallowing problems.  She is tolerating an oral diet.  She has not had any fever or chills.  Her incision looks excellent.  I removed her drain without difficulty.  She will be discharged home in good condition.  She will return next week for suture removal.  She will call if there are any further problems.  She already has pain medication at home if she should need some.    EMR Dragon/Transcription disclaimer:   Much of this encounter note is an electronic transcription/translation of spoken language to printed text. The electronic translation of spoken language may permit erroneous, or at times, nonsensical words or phrases to be inadvertently transcribed; Although I have reviewed the note for such errors, some may still exist.

## 2018-06-12 ENCOUNTER — HOSPITAL ENCOUNTER (OUTPATIENT)
Dept: RADIATION ONCOLOGY | Facility: HOSPITAL | Age: 68
Discharge: HOME | End: 2018-06-12
Attending: RADIOLOGY | Admitting: RADIOLOGY

## 2018-06-14 ENCOUNTER — CLINICAL SUPPORT (OUTPATIENT)
Dept: ONCOLOGY | Facility: HOSPITAL | Age: 68
End: 2018-06-14

## 2018-06-14 ENCOUNTER — HOSPITAL ENCOUNTER (OUTPATIENT)
Facility: HOSPITAL | Age: 68
Discharge: HOME HEALTH | End: 2018-06-15
Attending: OTOLARYNGOLOGY | Admitting: OTOLARYNGOLOGY

## 2018-06-14 ENCOUNTER — ANESTHESIA (OUTPATIENT)
Dept: PERIOP | Facility: HOSPITAL | Age: 68
End: 2018-06-14

## 2018-06-14 ENCOUNTER — ANESTHESIA EVENT (OUTPATIENT)
Dept: PERIOP | Facility: HOSPITAL | Age: 68
End: 2018-06-14

## 2018-06-14 ENCOUNTER — HOSPITAL ENCOUNTER (OUTPATIENT)
Dept: ONCOLOGY | Facility: CLINIC | Age: 68
Setting detail: INFUSION SERIES
Discharge: HOME | End: 2018-06-14
Attending: INTERNAL MEDICINE | Admitting: INTERNAL MEDICINE

## 2018-06-14 DIAGNOSIS — L02.11 ABSCESS, NECK: ICD-10-CM

## 2018-06-14 LAB
D-LACTATE SERPL-SCNC: 1.2 MMOL/L (ref 0.5–2)
HBV SURFACE AG SERPL QL IA: NORMAL
HCV AB SER DONR QL: NORMAL
HIV1 P24 AG SER QL: NORMAL
HIV1+2 AB SER QL: NORMAL

## 2018-06-14 PROCEDURE — 25010000002 FENTANYL CITRATE (PF) 100 MCG/2ML SOLUTION: Performed by: ANESTHESIOLOGY

## 2018-06-14 PROCEDURE — 25010000002 NEOSTIGMINE PER 0.5 MG: Performed by: ANESTHESIOLOGY

## 2018-06-14 PROCEDURE — 25010000003 AMPICILLIN-SULBACTAM PER 1.5 G: Performed by: OTOLARYNGOLOGY

## 2018-06-14 PROCEDURE — 87075 CULTR BACTERIA EXCEPT BLOOD: CPT | Performed by: OTOLARYNGOLOGY

## 2018-06-14 PROCEDURE — G0378 HOSPITAL OBSERVATION PER HR: HCPCS

## 2018-06-14 PROCEDURE — 87899 AGENT NOS ASSAY W/OPTIC: CPT | Performed by: OTOLARYNGOLOGY

## 2018-06-14 PROCEDURE — 25010000002 PHENYLEPHRINE PER 1 ML: Performed by: ANESTHESIOLOGY

## 2018-06-14 PROCEDURE — 83605 ASSAY OF LACTIC ACID: CPT | Performed by: OTOLARYNGOLOGY

## 2018-06-14 PROCEDURE — 87340 HEPATITIS B SURFACE AG IA: CPT | Performed by: OTOLARYNGOLOGY

## 2018-06-14 PROCEDURE — 87521 HEPATITIS C PROBE&RVRS TRNSC: CPT | Performed by: OTOLARYNGOLOGY

## 2018-06-14 PROCEDURE — 25010000002 MIDAZOLAM PER 1 MG: Performed by: ANESTHESIOLOGY

## 2018-06-14 PROCEDURE — 87205 SMEAR GRAM STAIN: CPT | Performed by: OTOLARYNGOLOGY

## 2018-06-14 PROCEDURE — 25010000002 PROPOFOL 10 MG/ML EMULSION: Performed by: ANESTHESIOLOGY

## 2018-06-14 PROCEDURE — 86803 HEPATITIS C AB TEST: CPT | Performed by: OTOLARYNGOLOGY

## 2018-06-14 PROCEDURE — G0432 EIA HIV-1/HIV-2 SCREEN: HCPCS | Performed by: OTOLARYNGOLOGY

## 2018-06-14 PROCEDURE — 25010000002 ONDANSETRON PER 1 MG: Performed by: ANESTHESIOLOGY

## 2018-06-14 PROCEDURE — 87070 CULTURE OTHR SPECIMN AEROBIC: CPT | Performed by: OTOLARYNGOLOGY

## 2018-06-14 PROCEDURE — 87186 SC STD MICRODIL/AGAR DIL: CPT | Performed by: OTOLARYNGOLOGY

## 2018-06-14 RX ORDER — MORPHINE SULFATE 2 MG/ML
2 INJECTION, SOLUTION INTRAMUSCULAR; INTRAVENOUS
Status: DISCONTINUED | OUTPATIENT
Start: 2018-06-14 | End: 2018-06-15 | Stop reason: HOSPADM

## 2018-06-14 RX ORDER — HYDROCODONE BITARTRATE AND ACETAMINOPHEN 7.5; 325 MG/1; MG/1
1 TABLET ORAL EVERY 4 HOURS PRN
Status: DISCONTINUED | OUTPATIENT
Start: 2018-06-14 | End: 2018-06-15 | Stop reason: HOSPADM

## 2018-06-14 RX ORDER — PROMETHAZINE HYDROCHLORIDE 25 MG/1
25 SUPPOSITORY RECTAL EVERY 6 HOURS PRN
Status: DISCONTINUED | OUTPATIENT
Start: 2018-06-14 | End: 2018-06-15 | Stop reason: HOSPADM

## 2018-06-14 RX ORDER — NALOXONE HCL 0.4 MG/ML
0.4 VIAL (ML) INJECTION
Status: DISCONTINUED | OUTPATIENT
Start: 2018-06-14 | End: 2018-06-15 | Stop reason: HOSPADM

## 2018-06-14 RX ORDER — HYDROXYCHLOROQUINE SULFATE 200 MG/1
200 TABLET, FILM COATED ORAL DAILY
Status: DISCONTINUED | OUTPATIENT
Start: 2018-06-14 | End: 2018-06-15 | Stop reason: HOSPADM

## 2018-06-14 RX ORDER — PROMETHAZINE HYDROCHLORIDE 25 MG/1
25 TABLET ORAL EVERY 6 HOURS PRN
Status: DISCONTINUED | OUTPATIENT
Start: 2018-06-14 | End: 2018-06-15 | Stop reason: HOSPADM

## 2018-06-14 RX ORDER — LIDOCAINE HYDROCHLORIDE 10 MG/ML
0.5 INJECTION, SOLUTION EPIDURAL; INFILTRATION; INTRACAUDAL; PERINEURAL ONCE AS NEEDED
Status: DISCONTINUED | OUTPATIENT
Start: 2018-06-14 | End: 2018-06-14 | Stop reason: HOSPADM

## 2018-06-14 RX ORDER — SODIUM CHLORIDE 0.9 % (FLUSH) 0.9 %
1-10 SYRINGE (ML) INJECTION AS NEEDED
Status: DISCONTINUED | OUTPATIENT
Start: 2018-06-14 | End: 2018-06-14 | Stop reason: HOSPADM

## 2018-06-14 RX ORDER — PROMETHAZINE HYDROCHLORIDE 25 MG/ML
12.5 INJECTION, SOLUTION INTRAMUSCULAR; INTRAVENOUS EVERY 6 HOURS PRN
Status: DISCONTINUED | OUTPATIENT
Start: 2018-06-14 | End: 2018-06-15 | Stop reason: HOSPADM

## 2018-06-14 RX ORDER — SODIUM CHLORIDE 0.9 % (FLUSH) 0.9 %
1-10 SYRINGE (ML) INJECTION AS NEEDED
Status: DISCONTINUED | OUTPATIENT
Start: 2018-06-14 | End: 2018-06-15 | Stop reason: HOSPADM

## 2018-06-14 RX ORDER — FAMOTIDINE 10 MG/ML
20 INJECTION, SOLUTION INTRAVENOUS ONCE
Status: COMPLETED | OUTPATIENT
Start: 2018-06-14 | End: 2018-06-14

## 2018-06-14 RX ORDER — MIDAZOLAM HYDROCHLORIDE 1 MG/ML
1 INJECTION INTRAMUSCULAR; INTRAVENOUS
Status: DISCONTINUED | OUTPATIENT
Start: 2018-06-14 | End: 2018-06-14 | Stop reason: HOSPADM

## 2018-06-14 RX ORDER — PROPOFOL 10 MG/ML
VIAL (ML) INTRAVENOUS AS NEEDED
Status: DISCONTINUED | OUTPATIENT
Start: 2018-06-14 | End: 2018-06-14 | Stop reason: SURG

## 2018-06-14 RX ORDER — HYDROXYCHLOROQUINE SULFATE 200 MG/1
200 TABLET, FILM COATED ORAL DAILY
COMMUNITY
End: 2019-06-16

## 2018-06-14 RX ORDER — AMLODIPINE BESYLATE 5 MG/1
5 TABLET ORAL EVERY MORNING
Status: DISCONTINUED | OUTPATIENT
Start: 2018-06-15 | End: 2018-06-15 | Stop reason: HOSPADM

## 2018-06-14 RX ORDER — SODIUM CHLORIDE, SODIUM LACTATE, POTASSIUM CHLORIDE, CALCIUM CHLORIDE 600; 310; 30; 20 MG/100ML; MG/100ML; MG/100ML; MG/100ML
9 INJECTION, SOLUTION INTRAVENOUS CONTINUOUS
Status: DISCONTINUED | OUTPATIENT
Start: 2018-06-14 | End: 2018-06-14

## 2018-06-14 RX ORDER — ROCURONIUM BROMIDE 10 MG/ML
INJECTION, SOLUTION INTRAVENOUS AS NEEDED
Status: DISCONTINUED | OUTPATIENT
Start: 2018-06-14 | End: 2018-06-14 | Stop reason: SURG

## 2018-06-14 RX ORDER — CLINDAMYCIN HYDROCHLORIDE 300 MG/1
300 CAPSULE ORAL 4 TIMES DAILY
Status: DISCONTINUED | OUTPATIENT
Start: 2018-06-14 | End: 2018-06-14

## 2018-06-14 RX ORDER — MORPHINE SULFATE 10 MG/ML
4 INJECTION INTRAMUSCULAR; INTRAVENOUS; SUBCUTANEOUS
Status: DISCONTINUED | OUTPATIENT
Start: 2018-06-14 | End: 2018-06-15 | Stop reason: HOSPADM

## 2018-06-14 RX ORDER — ONDANSETRON 2 MG/ML
INJECTION INTRAMUSCULAR; INTRAVENOUS AS NEEDED
Status: DISCONTINUED | OUTPATIENT
Start: 2018-06-14 | End: 2018-06-14 | Stop reason: SURG

## 2018-06-14 RX ORDER — GLYCOPYRROLATE 0.2 MG/ML
INJECTION INTRAMUSCULAR; INTRAVENOUS AS NEEDED
Status: DISCONTINUED | OUTPATIENT
Start: 2018-06-14 | End: 2018-06-14 | Stop reason: SURG

## 2018-06-14 RX ORDER — DEXTROSE, SODIUM CHLORIDE, AND POTASSIUM CHLORIDE 5; .45; .15 G/100ML; G/100ML; G/100ML
100 INJECTION INTRAVENOUS CONTINUOUS
Status: DISCONTINUED | OUTPATIENT
Start: 2018-06-14 | End: 2018-06-15 | Stop reason: HOSPADM

## 2018-06-14 RX ORDER — LEVOTHYROXINE SODIUM 112 UG/1
112 TABLET ORAL
Status: DISCONTINUED | OUTPATIENT
Start: 2018-06-15 | End: 2018-06-15 | Stop reason: HOSPADM

## 2018-06-14 RX ORDER — LISINOPRIL 40 MG/1
40 TABLET ORAL EVERY MORNING
Status: DISCONTINUED | OUTPATIENT
Start: 2018-06-15 | End: 2018-06-15 | Stop reason: HOSPADM

## 2018-06-14 RX ORDER — LIDOCAINE HYDROCHLORIDE 20 MG/ML
INJECTION, SOLUTION INFILTRATION; PERINEURAL AS NEEDED
Status: DISCONTINUED | OUTPATIENT
Start: 2018-06-14 | End: 2018-06-14 | Stop reason: SURG

## 2018-06-14 RX ORDER — FENTANYL CITRATE 50 UG/ML
50 INJECTION, SOLUTION INTRAMUSCULAR; INTRAVENOUS
Status: DISCONTINUED | OUTPATIENT
Start: 2018-06-14 | End: 2018-06-14 | Stop reason: HOSPADM

## 2018-06-14 RX ORDER — MIDAZOLAM HYDROCHLORIDE 1 MG/ML
2 INJECTION INTRAMUSCULAR; INTRAVENOUS
Status: DISCONTINUED | OUTPATIENT
Start: 2018-06-14 | End: 2018-06-14 | Stop reason: HOSPADM

## 2018-06-14 RX ORDER — SODIUM CHLORIDE 0.9 % (FLUSH) 0.9 %
1-10 SYRINGE (ML) INJECTION AS NEEDED
Status: DISCONTINUED | OUTPATIENT
Start: 2018-06-14 | End: 2018-06-14

## 2018-06-14 RX ADMIN — PHENYLEPHRINE HYDROCHLORIDE 100 MCG: 10 INJECTION INTRAVENOUS at 17:36

## 2018-06-14 RX ADMIN — SODIUM CHLORIDE, POTASSIUM CHLORIDE, SODIUM LACTATE AND CALCIUM CHLORIDE 1000 ML: 600; 310; 30; 20 INJECTION, SOLUTION INTRAVENOUS at 14:57

## 2018-06-14 RX ADMIN — FAMOTIDINE 20 MG: 10 INJECTION, SOLUTION INTRAVENOUS at 17:07

## 2018-06-14 RX ADMIN — GLYCOPYRROLATE 0.2 MG: 0.2 INJECTION INTRAMUSCULAR; INTRAVENOUS at 18:16

## 2018-06-14 RX ADMIN — PHENYLEPHRINE HYDROCHLORIDE 200 MCG: 10 INJECTION INTRAVENOUS at 18:11

## 2018-06-14 RX ADMIN — POTASSIUM CHLORIDE, DEXTROSE MONOHYDRATE AND SODIUM CHLORIDE 100 ML/HR: 150; 5; 450 INJECTION, SOLUTION INTRAVENOUS at 21:59

## 2018-06-14 RX ADMIN — ROCURONIUM BROMIDE 30 MG: 10 INJECTION INTRAVENOUS at 17:33

## 2018-06-14 RX ADMIN — ROCURONIUM BROMIDE 10 MG: 10 INJECTION INTRAVENOUS at 17:35

## 2018-06-14 RX ADMIN — MIDAZOLAM 1 MG: 1 INJECTION INTRAMUSCULAR; INTRAVENOUS at 17:07

## 2018-06-14 RX ADMIN — NEOSTIGMINE METHYLSULFATE 3 MG: 1 INJECTION INTRAMUSCULAR; INTRAVENOUS; SUBCUTANEOUS at 18:16

## 2018-06-14 RX ADMIN — PHENYLEPHRINE HYDROCHLORIDE 150 MCG: 10 INJECTION INTRAVENOUS at 18:07

## 2018-06-14 RX ADMIN — LIDOCAINE HYDROCHLORIDE 60 MG: 20 INJECTION, SOLUTION INFILTRATION; PERINEURAL at 17:32

## 2018-06-14 RX ADMIN — ONDANSETRON 4 MG: 2 INJECTION INTRAMUSCULAR; INTRAVENOUS at 18:05

## 2018-06-14 RX ADMIN — PROPOFOL 120 MG: 10 INJECTION, EMULSION INTRAVENOUS at 17:32

## 2018-06-14 RX ADMIN — FENTANYL CITRATE 50 MCG: 50 INJECTION INTRAMUSCULAR; INTRAVENOUS at 17:57

## 2018-06-14 RX ADMIN — AMPICILLIN SODIUM AND SULBACTAM SODIUM 3 G: 2; 1 INJECTION, POWDER, FOR SOLUTION INTRAVENOUS at 17:56

## 2018-06-14 RX ADMIN — PHENYLEPHRINE HYDROCHLORIDE 200 MCG: 10 INJECTION INTRAVENOUS at 18:16

## 2018-06-14 RX ADMIN — PHENYLEPHRINE HYDROCHLORIDE 100 MCG: 10 INJECTION INTRAVENOUS at 17:47

## 2018-06-14 RX ADMIN — PHENYLEPHRINE HYDROCHLORIDE 150 MCG: 10 INJECTION INTRAVENOUS at 18:03

## 2018-06-14 RX ADMIN — FENTANYL CITRATE 50 MCG: 50 INJECTION INTRAMUSCULAR; INTRAVENOUS at 17:07

## 2018-06-14 RX ADMIN — FENTANYL CITRATE 100 MCG: 50 INJECTION INTRAMUSCULAR; INTRAVENOUS at 17:31

## 2018-06-14 RX ADMIN — SODIUM CHLORIDE, POTASSIUM CHLORIDE, SODIUM LACTATE AND CALCIUM CHLORIDE 9 ML/HR: 600; 310; 30; 20 INJECTION, SOLUTION INTRAVENOUS at 17:05

## 2018-06-14 RX ADMIN — SODIUM CHLORIDE 3 G: 900 INJECTION INTRAVENOUS at 23:04

## 2018-06-14 NOTE — PROGRESS NOTES
PATIENTS ONCOLOGY RECORD LOCATED IN [facility]      Subjective     Name:  KATJA MCMAHON     Date:  2018  Address:  [place] [address]  Home: [phone]  :  1950 AGE:  67 y.o.        RECORDS OBTAINED:  Patients Oncology Record is located in [facility]

## 2018-06-14 NOTE — ANESTHESIA POSTPROCEDURE EVALUATION
Patient: Elma Sheikh    Procedure Summary     Date:  06/14/18 Room / Location:  [facility] OR  / [facility] MAIN OR    Anesthesia Start:  1727 Anesthesia Stop:  1829    Procedure:  RT. NECK INCISION AND DRAINAGE (Right ) Diagnosis:      Surgeon:  Toan Reed MD Provider:  Devante Dutta MD    Anesthesia Type:  general ASA Status:  3          Anesthesia Type: general  Last vitals  BP   99/56 (06/14/18 1920)   Temp   37.7 °C (99.9 °F) (06/14/18 1925)   Pulse   114 (06/14/18 1920)   Resp   16 (06/14/18 1920)     SpO2   95 % (06/14/18 1920)     Post Anesthesia Care and Evaluation    Patient location during evaluation: bedside  Patient participation: complete - patient participated  Level of consciousness: awake  Pain score: 2  Pain management: adequate  Airway patency: patent  Anesthetic complications: No anesthetic complications  PONV Status: none  Cardiovascular status: acceptable  Respiratory status: acceptable  Hydration status: acceptable    Comments: BP 99/56 (BP Location: Left arm, Patient Position: Lying)   Pulse 114   Temp 37.7 °C (99.9 °F) (Oral)   Resp 16   Wt 59 kg (130 lb 1 oz)   SpO2 95%   BMI 21.64 kg/m²

## 2018-06-14 NOTE — ANESTHESIA PREPROCEDURE EVALUATION
Anesthesia Evaluation     Patient summary reviewed   no history of anesthetic complications:  NPO Solid Status: > 8 hours  NPO Liquid Status: > 2 hours           Airway   Comment: Prior easy intubation. Cannot open mouth 2/2 pain (not seemingly mechanical)  Dental      Pulmonary     breath sounds clear to auscultation  (-) shortness of breath, not a smoker  Cardiovascular   Exercise tolerance: good (4-7 METS)    Rhythm: regular  Rate: normal    (+) hypertension, valvular problems/murmurs (Bicuspid valve),   (-) angina, [place]      Neuro/Psych  GI/Hepatic/Renal/Endo    (+)  GERD well controlled,      Musculoskeletal     Abdominal    Substance History      OB/GYN          Other   (+) arthritis   history of cancer                    Anesthesia Plan    ASA 3     general   (Documented Succ allergy (sounds like psuedocholinesterase deficiency))  intravenous induction   Anesthetic plan and risks discussed with patient.

## 2018-06-14 NOTE — ANESTHESIA PROCEDURE NOTES
Airway  Urgency: elective    Difficult airway (2/2 neck abcess)    General Information and Staff    Patient location during procedure: OR  Anesthesiologist: GUS HERR    Indications and Patient Condition  Indications for airway management: airway protection    Preoxygenated: yes  Mask difficulty assessment: 2 - vent by mask + OA or adjuvant +/- NMBA    Final Airway Details  Final airway type: endotracheal airway      Successful airway: ETT  Cuffed: yes   Successful intubation technique: direct laryngoscopy  Facilitating devices/methods: cricoid pressure and anterior pressure/BURP  Endotracheal tube insertion site: oral  Blade: CMAC  Blade size: D blade.  ETT size: 7.0 mm  Cormack-Lehane Classification: grade I - full view of glottis  Placement verified by: chest auscultation and capnometry   Cuff volume (mL): 6  Measured from: teeth  Number of attempts at approach: 1    Additional Comments  Her jaw was very tight. Decision was made to go straight to [facility] (called to room, easily masked while waiting). No pressure on teeth. Anterior cricoid pressure to assist with intubation. Easy. Atraumatic.

## 2018-06-15 VITALS
WEIGHT: 130.06 LBS | RESPIRATION RATE: 16 BRPM | HEART RATE: 108 BPM | BODY MASS INDEX: 21.64 KG/M2 | DIASTOLIC BLOOD PRESSURE: 66 MMHG | TEMPERATURE: 98.7 F | SYSTOLIC BLOOD PRESSURE: 110 MMHG | OXYGEN SATURATION: 94 %

## 2018-06-15 LAB
ANION GAP SERPL CALCULATED.3IONS-SCNC: 11.4 MMOL/L
BUN BLD-MCNC: 9 MG/DL (ref 8–23)
BUN/CREAT SERPL: 14.1 (ref 7–25)
CA-I BLD-MCNC: 5 MG/DL (ref 4.6–5.4)
CA-I SERPL ISE-MCNC: 1.25 MMOL/L (ref 1.15–1.35)
CALCIUM SPEC-SCNC: 8.7 MG/DL (ref 8.6–10.5)
CHLORIDE SERPL-SCNC: 101 MMOL/L (ref 98–107)
CO2 SERPL-SCNC: 27.6 MMOL/L (ref 22–29)
CREAT BLD-MCNC: 0.64 MG/DL (ref 0.57–1)
DEPRECATED RDW RBC AUTO: 43.7 FL (ref 37–54)
ERYTHROCYTE [DISTWIDTH] IN BLOOD BY AUTOMATED COUNT: 13.6 % (ref 11.7–13)
GFR SERPL CREATININE-BSD FRML MDRD: 93 ML/MIN/1.73
GLUCOSE BLD-MCNC: 109 MG/DL (ref 65–99)
HCT VFR BLD AUTO: 29.2 % (ref 35.6–45.5)
HGB BLD-MCNC: 9.2 G/DL (ref 11.9–15.5)
MAGNESIUM SERPL-MCNC: 2.3 MG/DL (ref 1.6–2.4)
MCH RBC QN AUTO: 27.8 PG (ref 26.9–32)
MCHC RBC AUTO-ENTMCNC: 31.5 G/DL (ref 32.4–36.3)
MCV RBC AUTO: 88.2 FL (ref 80.5–98.2)
PHOSPHATE SERPL-MCNC: 2.9 MG/DL (ref 2.5–4.5)
PLATELET # BLD AUTO: 201 10*3/MM3 (ref 140–500)
PMV BLD AUTO: 9.9 FL (ref 6–12)
POTASSIUM BLD-SCNC: 3.9 MMOL/L (ref 3.5–5.2)
RBC # BLD AUTO: 3.31 10*6/MM3 (ref 3.9–5.2)
SODIUM BLD-SCNC: 140 MMOL/L (ref 136–145)
WBC NRBC COR # BLD: 7.56 10*3/MM3 (ref 4.5–10.7)

## 2018-06-15 PROCEDURE — 82330 ASSAY OF CALCIUM: CPT | Performed by: OTOLARYNGOLOGY

## 2018-06-15 PROCEDURE — 25010000002 HEPARIN FLUSH (PORCINE) 100 UNIT/ML SOLUTION: Performed by: OTOLARYNGOLOGY

## 2018-06-15 PROCEDURE — 85027 COMPLETE CBC AUTOMATED: CPT | Performed by: OTOLARYNGOLOGY

## 2018-06-15 PROCEDURE — 63710000001 LISINOPRIL 40 MG TABLET: Performed by: OTOLARYNGOLOGY

## 2018-06-15 PROCEDURE — A9270 NON-COVERED ITEM OR SERVICE: HCPCS | Performed by: OTOLARYNGOLOGY

## 2018-06-15 PROCEDURE — 80048 BASIC METABOLIC PNL TOTAL CA: CPT | Performed by: OTOLARYNGOLOGY

## 2018-06-15 PROCEDURE — 63710000001 AMLODIPINE 5 MG TABLET: Performed by: OTOLARYNGOLOGY

## 2018-06-15 PROCEDURE — 84100 ASSAY OF PHOSPHORUS: CPT | Performed by: OTOLARYNGOLOGY

## 2018-06-15 PROCEDURE — 83735 ASSAY OF MAGNESIUM: CPT | Performed by: OTOLARYNGOLOGY

## 2018-06-15 PROCEDURE — 25010000003 AMPICILLIN-SULBACTAM PER 1.5 G: Performed by: OTOLARYNGOLOGY

## 2018-06-15 PROCEDURE — G0378 HOSPITAL OBSERVATION PER HR: HCPCS

## 2018-06-15 PROCEDURE — 63710000001 LEVOTHYROXINE 112 MCG TABLET: Performed by: OTOLARYNGOLOGY

## 2018-06-15 RX ADMIN — AMLODIPINE BESYLATE 5 MG: 5 TABLET ORAL at 07:06

## 2018-06-15 RX ADMIN — LISINOPRIL 40 MG: 40 TABLET ORAL at 07:06

## 2018-06-15 RX ADMIN — Medication 500 UNITS: at 14:02

## 2018-06-15 RX ADMIN — LEVOTHYROXINE SODIUM 112 MCG: 112 TABLET ORAL at 05:27

## 2018-06-15 RX ADMIN — SODIUM CHLORIDE 3 G: 900 INJECTION INTRAVENOUS at 12:20

## 2018-06-15 RX ADMIN — POTASSIUM CHLORIDE, DEXTROSE MONOHYDRATE AND SODIUM CHLORIDE 100 ML/HR: 150; 5; 450 INJECTION, SOLUTION INTRAVENOUS at 10:02

## 2018-06-15 RX ADMIN — SODIUM CHLORIDE 3 G: 900 INJECTION INTRAVENOUS at 05:26

## 2018-06-17 LAB
BACTERIA SPEC AEROBE CULT: ABNORMAL
GRAM STN SPEC: ABNORMAL
GRAM STN SPEC: ABNORMAL

## 2018-06-18 LAB — HEPATITIS C RNA-NAA: NEGATIVE

## 2018-06-19 LAB — BACTERIA SPEC ANAEROBE CULT: NORMAL

## 2018-06-20 ENCOUNTER — HOSPITAL ENCOUNTER (OUTPATIENT)
Dept: GENERAL RADIOLOGY | Facility: HOSPITAL | Age: 68
Discharge: HOME | End: 2018-06-20
Attending: RADIOLOGY | Admitting: RADIOLOGY

## 2018-06-26 ENCOUNTER — CLINICAL SUPPORT (OUTPATIENT)
Dept: ONCOLOGY | Facility: HOSPITAL | Age: 68
End: 2018-06-26

## 2018-06-26 ENCOUNTER — HOSPITAL ENCOUNTER (OUTPATIENT)
Dept: ONCOLOGY | Facility: CLINIC | Age: 68
Setting detail: INFUSION SERIES
Discharge: HOME | End: 2018-06-26
Attending: INTERNAL MEDICINE | Admitting: INTERNAL MEDICINE

## 2018-06-27 ENCOUNTER — HOSPITAL ENCOUNTER (OUTPATIENT)
Dept: RADIATION ONCOLOGY | Facility: HOSPITAL | Age: 68
Discharge: HOME | End: 2018-06-27
Attending: RADIOLOGY | Admitting: RADIOLOGY

## 2018-07-10 ENCOUNTER — LAB REQUISITION (OUTPATIENT)
Dept: LAB | Facility: HOSPITAL | Age: 68
End: 2018-07-10

## 2018-07-10 DIAGNOSIS — D49.0 NEOPLASM OF DIGESTIVE SYSTEM: ICD-10-CM

## 2018-07-10 PROCEDURE — 88305 TISSUE EXAM BY PATHOLOGIST: CPT | Performed by: OTOLARYNGOLOGY

## 2018-07-12 LAB
CYTO UR: NORMAL
LAB AP CASE REPORT: NORMAL
LAB AP CLINICAL INFORMATION: NORMAL
LAB AP INTRADEPARTMENTAL CONSULT: NORMAL
PATH REPORT.FINAL DX SPEC: NORMAL
PATH REPORT.GROSS SPEC: NORMAL

## 2018-07-23 ENCOUNTER — CLINICAL SUPPORT (OUTPATIENT)
Dept: ONCOLOGY | Facility: HOSPITAL | Age: 68
End: 2018-07-23

## 2018-07-23 ENCOUNTER — HOSPITAL ENCOUNTER (OUTPATIENT)
Dept: ONCOLOGY | Facility: CLINIC | Age: 68
Setting detail: INFUSION SERIES
Discharge: HOME | End: 2018-07-23
Attending: INTERNAL MEDICINE | Admitting: INTERNAL MEDICINE

## 2018-07-23 NOTE — PROGRESS NOTES
PATIENTS ONCOLOGY RECORD LOCATED IN [facility]      Subjective     Name:  KATJA MCMAHON     Date:  2018  Address:  [place] [address]  Home: [phone]  :  1950 AGE:  67 y.o.        RECORDS OBTAINED:  Patients Oncology Record is located in [facility]

## 2018-07-26 ENCOUNTER — HOSPITAL ENCOUNTER (OUTPATIENT)
Dept: ONCOLOGY | Facility: HOSPITAL | Age: 68
Discharge: HOME | End: 2018-07-26
Attending: INTERNAL MEDICINE | Admitting: INTERNAL MEDICINE

## 2018-07-26 LAB — GLUCOSE BLD-MCNC: 101 MG/DL (ref 70–105)

## 2018-08-01 ENCOUNTER — HOSPITAL ENCOUNTER (OUTPATIENT)
Dept: MAMMOGRAPHY | Facility: HOSPITAL | Age: 68
Discharge: HOME | End: 2018-08-01
Attending: INTERNAL MEDICINE | Admitting: INTERNAL MEDICINE

## 2018-08-05 ENCOUNTER — ON CAMPUS - OUTPATIENT (OUTPATIENT)
Dept: URBAN - METROPOLITAN AREA HOSPITAL 85 | Facility: HOSPITAL | Age: 68
End: 2018-08-05
Payer: MEDICARE

## 2018-08-05 DIAGNOSIS — R93.3 ABNORMAL FINDINGS ON DIAGNOSTIC IMAGING OF OTHER PARTS OF DI: ICD-10-CM

## 2018-08-05 DIAGNOSIS — K62.5 HEMORRHAGE OF ANUS AND RECTUM: ICD-10-CM

## 2018-08-05 DIAGNOSIS — D64.89 OTHER SPECIFIED ANEMIAS: ICD-10-CM

## 2018-08-05 DIAGNOSIS — R10.84 GENERALIZED ABDOMINAL PAIN: ICD-10-CM

## 2018-08-05 DIAGNOSIS — R19.7 DIARRHEA, UNSPECIFIED: ICD-10-CM

## 2018-08-05 PROCEDURE — 99204 OFFICE O/P NEW MOD 45 MIN: CPT | Performed by: NURSE PRACTITIONER

## 2018-08-05 PROCEDURE — 99222 1ST HOSP IP/OBS MODERATE 55: CPT | Performed by: NURSE PRACTITIONER

## 2018-08-06 ENCOUNTER — ON CAMPUS - OUTPATIENT (OUTPATIENT)
Dept: URBAN - METROPOLITAN AREA HOSPITAL 85 | Facility: HOSPITAL | Age: 68
End: 2018-08-06
Payer: MEDICARE

## 2018-08-06 DIAGNOSIS — R10.11 RIGHT UPPER QUADRANT PAIN: ICD-10-CM

## 2018-08-06 DIAGNOSIS — K92.1 MELENA: ICD-10-CM

## 2018-08-06 DIAGNOSIS — R19.7 DIARRHEA, UNSPECIFIED: ICD-10-CM

## 2018-08-06 DIAGNOSIS — R10.30 LOWER ABDOMINAL PAIN, UNSPECIFIED: ICD-10-CM

## 2018-08-06 DIAGNOSIS — K20.8 OTHER ESOPHAGITIS: ICD-10-CM

## 2018-08-06 DIAGNOSIS — K55.9 VASCULAR DISORDER OF INTESTINE, UNSPECIFIED: ICD-10-CM

## 2018-08-06 DIAGNOSIS — R93.3 ABNORMAL FINDINGS ON DIAGNOSTIC IMAGING OF OTHER PARTS OF DI: ICD-10-CM

## 2018-08-06 PROCEDURE — 45378 DIAGNOSTIC COLONOSCOPY: CPT | Performed by: INTERNAL MEDICINE

## 2018-08-06 PROCEDURE — 43235 EGD DIAGNOSTIC BRUSH WASH: CPT | Performed by: INTERNAL MEDICINE

## 2018-08-15 ENCOUNTER — HOSPITAL ENCOUNTER (OUTPATIENT)
Dept: ONCOLOGY | Facility: HOSPITAL | Age: 68
Discharge: HOME | End: 2018-08-15
Attending: RADIOLOGY | Admitting: RADIOLOGY

## 2018-08-23 ENCOUNTER — CONVERSION ENCOUNTER (OUTPATIENT)
Dept: CARDIOLOGY | Facility: CLINIC | Age: 68
End: 2018-08-23

## 2018-08-24 ENCOUNTER — HOSPITAL ENCOUNTER (OUTPATIENT)
Dept: RADIATION ONCOLOGY | Facility: HOSPITAL | Age: 68
Setting detail: RECURRING SERIES
Discharge: HOME | End: 2018-10-18
Attending: RADIOLOGY | Admitting: RADIOLOGY

## 2018-08-24 LAB — CREAT BLDA-MCNC: 0.7 MG/DL (ref 0.6–1.3)

## 2018-08-28 ENCOUNTER — HOSPITAL ENCOUNTER (OUTPATIENT)
Dept: OTHER | Facility: HOSPITAL | Age: 68
Discharge: HOME | End: 2018-08-28
Attending: INTERNAL MEDICINE | Admitting: INTERNAL MEDICINE

## 2018-08-28 ENCOUNTER — HOSPITAL ENCOUNTER (OUTPATIENT)
Dept: CARDIOLOGY | Facility: HOSPITAL | Age: 68
Discharge: HOME | End: 2018-08-28
Attending: INTERNAL MEDICINE | Admitting: INTERNAL MEDICINE

## 2018-08-28 LAB
BACTERIA SPEC AEROBE CULT: NORMAL
BACTERIA SPEC AEROBE CULT: NORMAL
Lab: NORMAL
Lab: NORMAL
MICRO REPORT STATUS: NORMAL
MICRO REPORT STATUS: NORMAL
SPECIMEN SOURCE: NORMAL
SPECIMEN SOURCE: NORMAL

## 2018-09-06 ENCOUNTER — CLINICAL SUPPORT (OUTPATIENT)
Dept: ONCOLOGY | Facility: HOSPITAL | Age: 68
End: 2018-09-06

## 2018-09-06 ENCOUNTER — HOSPITAL ENCOUNTER (OUTPATIENT)
Dept: ONCOLOGY | Facility: CLINIC | Age: 68
Setting detail: INFUSION SERIES
Discharge: HOME | End: 2018-09-06
Attending: INTERNAL MEDICINE | Admitting: INTERNAL MEDICINE

## 2018-09-06 NOTE — PROGRESS NOTES
PATIENTS ONCOLOGY RECORD LOCATED IN [facility]      Subjective     Name:  KATJA MCMAHON     Date:  2018  Address:  [place] [address]  Home: [phone]  :  1950 AGE:  67 y.o.        RECORDS OBTAINED:  Patients Oncology Record is located in [facility]

## 2018-09-07 ENCOUNTER — HOSPITAL ENCOUNTER (OUTPATIENT)
Dept: MRI IMAGING | Facility: HOSPITAL | Age: 68
Discharge: HOME | End: 2018-09-07
Attending: INTERNAL MEDICINE | Admitting: INTERNAL MEDICINE

## 2018-09-14 ENCOUNTER — HOSPITAL ENCOUNTER (OUTPATIENT)
Dept: NUCLEAR MEDICINE | Facility: HOSPITAL | Age: 68
Discharge: HOME | End: 2018-09-14
Attending: NURSE PRACTITIONER | Admitting: NURSE PRACTITIONER

## 2018-10-15 ENCOUNTER — HOSPITAL ENCOUNTER (OUTPATIENT)
Dept: ONCOLOGY | Facility: CLINIC | Age: 68
Setting detail: INFUSION SERIES
Discharge: HOME | End: 2018-10-15
Attending: INTERNAL MEDICINE | Admitting: INTERNAL MEDICINE

## 2018-10-15 ENCOUNTER — CLINICAL SUPPORT (OUTPATIENT)
Dept: ONCOLOGY | Facility: HOSPITAL | Age: 68
End: 2018-10-15

## 2018-10-15 NOTE — PROGRESS NOTES
PATIENTS ONCOLOGY RECORD LOCATED IN [facility]      Subjective     Name:  KATJA MCMAHON     Date:  10/15/2018  Address:  [place] [address]  Home: [phone]  :  1950 AGE:  67 y.o.        RECORDS OBTAINED:  Patients Oncology Record is located in [facility]

## 2018-10-31 ENCOUNTER — HOSPITAL ENCOUNTER (OUTPATIENT)
Dept: RADIATION ONCOLOGY | Facility: HOSPITAL | Age: 68
Discharge: HOME | End: 2018-10-31
Attending: RADIOLOGY | Admitting: RADIOLOGY

## 2018-11-21 ENCOUNTER — HOSPITAL ENCOUNTER (OUTPATIENT)
Dept: RADIATION ONCOLOGY | Facility: HOSPITAL | Age: 68
Discharge: HOME | End: 2018-11-21
Attending: RADIOLOGY | Admitting: RADIOLOGY

## 2018-11-26 ENCOUNTER — HOSPITAL ENCOUNTER (OUTPATIENT)
Dept: ONCOLOGY | Facility: CLINIC | Age: 68
Setting detail: INFUSION SERIES
Discharge: HOME | End: 2018-11-26
Attending: INTERNAL MEDICINE | Admitting: INTERNAL MEDICINE

## 2018-11-26 ENCOUNTER — HOSPITAL ENCOUNTER (OUTPATIENT)
Dept: ONCOLOGY | Facility: HOSPITAL | Age: 68
Discharge: HOME | End: 2018-11-26
Attending: INTERNAL MEDICINE | Admitting: INTERNAL MEDICINE

## 2018-11-26 ENCOUNTER — CLINICAL SUPPORT (OUTPATIENT)
Dept: ONCOLOGY | Facility: HOSPITAL | Age: 68
End: 2018-11-26

## 2018-11-26 LAB
ALBUMIN SERPL-MCNC: 3.8 G/DL (ref 3.5–4.8)
ALBUMIN/GLOB SERPL: 1.4 {RATIO} (ref 1–1.7)
ALP SERPL-CCNC: 68 IU/L (ref 32–91)
ALT SERPL-CCNC: 18 IU/L (ref 14–54)
ANION GAP SERPL CALC-SCNC: 13.9 MMOL/L (ref 10–20)
AST SERPL-CCNC: 24 IU/L (ref 15–41)
BILIRUB SERPL-MCNC: 0.9 MG/DL (ref 0.3–1.2)
BUN SERPL-MCNC: 14 MG/DL (ref 8–20)
BUN/CREAT SERPL: 20 (ref 5.4–26.2)
CALCIUM SERPL-MCNC: 9 MG/DL (ref 8.9–10.3)
CHLORIDE SERPL-SCNC: 92 MMOL/L (ref 101–111)
CONV CO2: 30 MMOL/L (ref 22–32)
CONV TOTAL PROTEIN: 6.5 G/DL (ref 6.1–7.9)
CREAT UR-MCNC: 0.7 MG/DL (ref 0.4–1)
GLOBULIN UR ELPH-MCNC: 2.7 G/DL (ref 2.5–3.8)
GLUCOSE SERPL-MCNC: 109 MG/DL (ref 65–99)
POTASSIUM SERPL-SCNC: 3.9 MMOL/L (ref 3.6–5.1)
SODIUM SERPL-SCNC: 132 MMOL/L (ref 136–144)

## 2018-11-26 NOTE — PROGRESS NOTES
PATIENTS ONCOLOGY RECORD LOCATED IN [employer]      Subjective     Name:  KATJA MCMAHON     Date:  2018  Address:   [address] [place] [address] [place] [address]  Home: [unfilled]  :  1950 AGE:  68 y.o.        RECORDS OBTAINED:  Patients Oncology Record is located in [employer]

## 2018-12-03 ENCOUNTER — HOSPITAL ENCOUNTER (OUTPATIENT)
Dept: MAMMOGRAPHY | Facility: HOSPITAL | Age: 68
Discharge: HOME | End: 2018-12-03
Attending: INTERNAL MEDICINE | Admitting: INTERNAL MEDICINE

## 2018-12-11 ENCOUNTER — HOSPITAL ENCOUNTER (OUTPATIENT)
Dept: RADIATION ONCOLOGY | Facility: HOSPITAL | Age: 68
Discharge: HOME | End: 2018-12-11
Attending: RADIOLOGY | Admitting: RADIOLOGY

## 2018-12-31 ENCOUNTER — CLINICAL SUPPORT (OUTPATIENT)
Dept: ONCOLOGY | Facility: HOSPITAL | Age: 68
End: 2018-12-31

## 2018-12-31 ENCOUNTER — HOSPITAL ENCOUNTER (OUTPATIENT)
Dept: ONCOLOGY | Facility: CLINIC | Age: 68
Setting detail: INFUSION SERIES
Discharge: HOME | End: 2018-12-31
Attending: INTERNAL MEDICINE | Admitting: INTERNAL MEDICINE

## 2018-12-31 NOTE — PROGRESS NOTES
PATIENTS ONCOLOGY RECORD LOCATED IN [employer]      Subjective     Name:  KATJA MCMAHON     Date:  2018  Address:   [address] [place] [address] [place] [address]  Home: [unfilled]  :  1950 AGE:  68 y.o.        RECORDS OBTAINED:  Patients Oncology Record is located in [employer]

## 2019-01-02 ENCOUNTER — HOSPITAL ENCOUNTER (OUTPATIENT)
Dept: RADIATION ONCOLOGY | Facility: HOSPITAL | Age: 69
Discharge: HOME | End: 2019-01-02
Attending: RADIOLOGY | Admitting: RADIOLOGY

## 2019-01-09 ENCOUNTER — HOSPITAL ENCOUNTER (OUTPATIENT)
Dept: OTHER | Facility: HOSPITAL | Age: 69
Discharge: HOME | End: 2019-01-09
Attending: OTOLARYNGOLOGY | Admitting: OTOLARYNGOLOGY

## 2019-01-09 LAB
BASOPHILS # BLD AUTO: 0 10*3/UL (ref 0–0.2)
BASOPHILS NFR BLD AUTO: 0 % (ref 0–2)
CREAT BLDA-MCNC: 0.6 MG/DL (ref 0.6–1.3)
DIFFERENTIAL METHOD BLD: (no result)
EOSINOPHIL # BLD AUTO: 0 % (ref 0–3)
EOSINOPHIL # BLD AUTO: 0 10*3/UL (ref 0–0.3)
ERYTHROCYTE [DISTWIDTH] IN BLOOD BY AUTOMATED COUNT: 14.3 % (ref 11.5–14.5)
HCT VFR BLD AUTO: 33 % (ref 35–49)
HGB BLD-MCNC: 11.1 G/DL (ref 12–15)
LYMPHOCYTES # BLD AUTO: 0.5 10*3/UL (ref 0.8–4.8)
LYMPHOCYTES NFR BLD AUTO: 7 % (ref 18–42)
MCH RBC QN AUTO: 30 PG (ref 26–32)
MCHC RBC AUTO-ENTMCNC: 33.6 G/DL (ref 32–36)
MCV RBC AUTO: 89.3 FL (ref 80–94)
MONOCYTES # BLD AUTO: 0.5 10*3/UL (ref 0.1–1.3)
MONOCYTES NFR BLD AUTO: 6 % (ref 2–11)
NEUTROPHILS # BLD AUTO: 6.6 10*3/UL (ref 2.3–8.6)
NEUTROPHILS NFR BLD AUTO: 87 % (ref 50–75)
NRBC BLD AUTO-RTO: 0 /100{WBCS}
NRBC/RBC NFR BLD MANUAL: 0 10*3/UL
PLATELET # BLD AUTO: 318 10*3/UL (ref 150–450)
PMV BLD AUTO: 7.3 FL (ref 7.4–10.4)
RBC # BLD AUTO: 3.7 10*6/UL (ref 4–5.4)
WBC # BLD AUTO: 7.6 10*3/UL (ref 4.5–11.5)

## 2019-01-15 ENCOUNTER — HOSPITAL ENCOUNTER (OUTPATIENT)
Dept: INFUSION THERAPY | Facility: HOSPITAL | Age: 69
Discharge: ANOTHER HOSPITAL | End: 2019-01-15
Attending: RADIOLOGY | Admitting: RADIOLOGY

## 2019-01-15 ENCOUNTER — HOSPITAL ENCOUNTER (OUTPATIENT)
Dept: RADIATION ONCOLOGY | Facility: HOSPITAL | Age: 69
Discharge: HOME | End: 2019-01-15
Attending: RADIOLOGY | Admitting: RADIOLOGY

## 2019-01-15 LAB
GRAM STN SPEC: NORMAL
Lab: NORMAL
MICRO REPORT STATUS: NORMAL
SPECIMEN SOURCE: NORMAL
SPECIMEN SOURCE: NORMAL

## 2019-01-17 ENCOUNTER — LAB REQUISITION (OUTPATIENT)
Dept: LAB | Facility: HOSPITAL | Age: 69
End: 2019-01-17

## 2019-01-17 DIAGNOSIS — Z00.00 ENCOUNTER FOR GENERAL ADULT MEDICAL EXAMINATION WITHOUT ABNORMAL FINDINGS: ICD-10-CM

## 2019-01-17 PROCEDURE — 88305 TISSUE EXAM BY PATHOLOGIST: CPT | Performed by: OTOLARYNGOLOGY

## 2019-01-28 ENCOUNTER — CLINICAL SUPPORT (OUTPATIENT)
Dept: ONCOLOGY | Facility: HOSPITAL | Age: 69
End: 2019-01-28

## 2019-01-28 ENCOUNTER — HOSPITAL ENCOUNTER (OUTPATIENT)
Dept: ONCOLOGY | Facility: CLINIC | Age: 69
Setting detail: INFUSION SERIES
Discharge: HOME | End: 2019-01-28
Attending: INTERNAL MEDICINE | Admitting: INTERNAL MEDICINE

## 2019-01-28 NOTE — PROGRESS NOTES
PATIENTS ONCOLOGY RECORD LOCATED IN [employer]      Subjective     Name:  KATJA MCMAHON     Date:  2019  Address:   [address] [place] [address] [place] [address]  Home: [unfilled]  :  1950 AGE:  68 y.o.        RECORDS OBTAINED:  Patients Oncology Record is located in [employer]

## 2019-02-19 LAB
CYTO UR: NORMAL
LAB AP CASE REPORT: NORMAL
LAB AP CLINICAL INFORMATION: NORMAL
PATH REPORT.ADDENDUM SPEC: NORMAL
PATH REPORT.FINAL DX SPEC: NORMAL
PATH REPORT.GROSS SPEC: NORMAL

## 2019-06-04 VITALS
WEIGHT: 139.2 LBS | WEIGHT: 137 LBS | DIASTOLIC BLOOD PRESSURE: 64 MMHG | RESPIRATION RATE: 16 BRPM | HEART RATE: 92 BPM | BODY MASS INDEX: 23.19 KG/M2 | DIASTOLIC BLOOD PRESSURE: 77 MMHG | WEIGHT: 141.6 LBS | BODY MASS INDEX: 22.8 KG/M2 | BODY MASS INDEX: 23.16 KG/M2 | WEIGHT: 139 LBS | SYSTOLIC BLOOD PRESSURE: 137 MMHG | OXYGEN SATURATION: 97 % | HEART RATE: 80 BPM | OXYGEN SATURATION: 100 % | BODY MASS INDEX: 23.36 KG/M2 | DIASTOLIC BLOOD PRESSURE: 80 MMHG | OXYGEN SATURATION: 100 % | HEIGHT: 65 IN | DIASTOLIC BLOOD PRESSURE: 82 MMHG | RESPIRATION RATE: 20 BRPM | OXYGEN SATURATION: 100 % | SYSTOLIC BLOOD PRESSURE: 146 MMHG | DIASTOLIC BLOOD PRESSURE: 77 MMHG | BODY MASS INDEX: 23.59 KG/M2 | HEART RATE: 98 BPM | SYSTOLIC BLOOD PRESSURE: 150 MMHG | DIASTOLIC BLOOD PRESSURE: 74 MMHG | OXYGEN SATURATION: 100 % | SYSTOLIC BLOOD PRESSURE: 144 MMHG | WEIGHT: 142.25 LBS | SYSTOLIC BLOOD PRESSURE: 123 MMHG | HEIGHT: 65 IN | HEART RATE: 89 BPM | HEIGHT: 65 IN | RESPIRATION RATE: 16 BRPM | SYSTOLIC BLOOD PRESSURE: 134 MMHG | WEIGHT: 134.25 LBS | SYSTOLIC BLOOD PRESSURE: 151 MMHG | HEART RATE: 88 BPM | HEART RATE: 83 BPM | RESPIRATION RATE: 20 BRPM | BODY MASS INDEX: 22.34 KG/M2 | OXYGEN SATURATION: 99 % | OXYGEN SATURATION: 100 % | HEART RATE: 86 BPM | HEIGHT: 65 IN | DIASTOLIC BLOOD PRESSURE: 68 MMHG | WEIGHT: 140.2 LBS

## 2019-06-07 ENCOUNTER — HOSPITAL ENCOUNTER (OUTPATIENT)
Dept: ONCOLOGY | Facility: CLINIC | Age: 69
Setting detail: INFUSION SERIES
Discharge: HOME | End: 2019-06-07
Attending: INTERNAL MEDICINE | Admitting: INTERNAL MEDICINE

## 2019-06-07 ENCOUNTER — HOSPITAL ENCOUNTER (OUTPATIENT)
Dept: GENERAL RADIOLOGY | Facility: HOSPITAL | Age: 69
Discharge: HOME | End: 2019-06-07
Attending: NURSE PRACTITIONER | Admitting: NURSE PRACTITIONER

## 2019-06-16 ENCOUNTER — HOSPITAL ENCOUNTER (EMERGENCY)
Dept: GENERAL RADIOLOGY | Facility: HOSPITAL | Age: 69
Discharge: HOME | End: 2019-06-16

## 2019-06-16 ENCOUNTER — HOSPITAL ENCOUNTER (INPATIENT)
Facility: HOSPITAL | Age: 69
LOS: 1 days | Discharge: HOME HEALTH | End: 2019-06-18
Attending: PHYSICIAN ASSISTANT | Admitting: INTERNAL MEDICINE

## 2019-06-16 DIAGNOSIS — J18.9 PNEUMONIA DUE TO INFECTIOUS ORGANISM, UNSPECIFIED LATERALITY, UNSPECIFIED PART OF LUNG: Primary | ICD-10-CM

## 2019-06-16 DIAGNOSIS — R04.2 HEMOPTYSIS: ICD-10-CM

## 2019-06-16 DIAGNOSIS — R07.9 CHEST PAIN, UNSPECIFIED TYPE: ICD-10-CM

## 2019-06-16 LAB
ALBUMIN SERPL-MCNC: 3.2 G/DL (ref 3.5–4.8)
ALBUMIN/GLOB SERPL: 1 G/DL (ref 1–1.7)
ALP SERPL-CCNC: 83 U/L (ref 32–91)
ALT SERPL W P-5'-P-CCNC: 14 U/L (ref 14–54)
ANION GAP SERPL CALCULATED.3IONS-SCNC: 14 MMOL/L (ref 10–20)
AST SERPL-CCNC: 19 U/L (ref 15–41)
BASOPHILS # BLD AUTO: 0 10*3/MM3 (ref 0–0.2)
BASOPHILS NFR BLD AUTO: 0.3 % (ref 0–1.5)
BILIRUB SERPL-MCNC: 0.4 MG/DL (ref 0.3–1.2)
BILIRUB UR QL STRIP: NEGATIVE
BNP SERPL-MCNC: 25 PG/ML
BUN SERPL-MCNC: 19 MG/DL (ref 8–20)
BUN/CREAT SERPL: 31.7 (ref 5.4–26.2)
CALCIUM SPEC-SCNC: 9.1 MG/DL (ref 8.9–10.3)
CHLORIDE SERPL-SCNC: 95 MMOL/L (ref 101–111)
CLARITY UR: CLEAR
CO2 SERPL-SCNC: 27 MMOL/L (ref 22–32)
COLOR UR: YELLOW
CREAT SERPL-MCNC: 0.6 MG/DL (ref 0.4–1)
DEPRECATED RDW RBC AUTO: 67.4 FL (ref 37–54)
EOSINOPHIL # BLD AUTO: 0 10*3/MM3 (ref 0–0.4)
EOSINOPHIL NFR BLD AUTO: 0.4 % (ref 0.3–6.2)
ERYTHROCYTE [DISTWIDTH] IN BLOOD BY AUTOMATED COUNT: 22.5 % (ref 12.3–15.4)
GFR SERPL CREATININE-BSD FRML MDRD: 100 ML/MIN/1.73
GLOBULIN UR ELPH-MCNC: 3.2 GM/DL (ref 2.5–3.8)
GLUCOSE SERPL-MCNC: 109 MG/DL (ref 65–99)
GLUCOSE UR STRIP-MCNC: NEGATIVE MG/DL
HCT VFR BLD AUTO: 26.5 % (ref 34–46.6)
HGB BLD-MCNC: 8.8 G/DL (ref 12–15.9)
HGB UR QL STRIP.AUTO: NEGATIVE
HOLD SPECIMEN: NORMAL
HOLD SPECIMEN: NORMAL
KETONES UR QL STRIP: NEGATIVE
LACTATE BLD-SCNC: 2.2 MMOL/L (ref 0.5–2.2)
LEUKOCYTE ESTERASE UR QL STRIP.AUTO: NEGATIVE
LYMPHOCYTES # BLD AUTO: 0.6 10*3/MM3 (ref 0.7–3.1)
LYMPHOCYTES NFR BLD AUTO: 19.8 % (ref 19.6–45.3)
MCH RBC QN AUTO: 28.9 PG (ref 26.6–33)
MCHC RBC AUTO-ENTMCNC: 33.4 G/DL (ref 31.5–35.7)
MCV RBC AUTO: 86.5 FL (ref 79–97)
MONOCYTES # BLD AUTO: 0.5 10*3/MM3 (ref 0.1–0.9)
MONOCYTES NFR BLD AUTO: 14.6 % (ref 5–12)
NEUTROPHILS NFR BLD AUTO: 2 10*3/MM3 (ref 1.7–7)
NEUTROPHILS NFR BLD AUTO: 64.9 % (ref 42.7–76)
NITRITE UR QL STRIP: NEGATIVE
NRBC BLD AUTO-RTO: 0.3 /100 WBC (ref 0–0.2)
PH UR STRIP.AUTO: 7.5 [PH] (ref 5–8)
PLATELET # BLD AUTO: 254 10*3/MM3 (ref 140–450)
PMV BLD AUTO: 7.7 FL (ref 6–12)
POTASSIUM SERPL-SCNC: 3.9 MMOL/L (ref 3.6–5.1)
PROT SERPL-MCNC: 6.4 G/DL (ref 6.1–7.9)
PROT UR QL STRIP: NEGATIVE
RBC # BLD AUTO: 3.06 10*6/MM3 (ref 3.77–5.28)
SODIUM SERPL-SCNC: 136 MMOL/L (ref 136–144)
SP GR UR STRIP: 1.01 (ref 1–1.03)
TROPONIN I SERPL-MCNC: <0.03 NG/ML (ref 0–0.03)
UROBILINOGEN UR QL STRIP: NORMAL
WBC NRBC COR # BLD: 3.1 10*3/MM3 (ref 3.4–10.8)
WHOLE BLOOD HOLD SPECIMEN: NORMAL
WHOLE BLOOD HOLD SPECIMEN: NORMAL

## 2019-06-16 PROCEDURE — 81003 URINALYSIS AUTO W/O SCOPE: CPT | Performed by: PHYSICIAN ASSISTANT

## 2019-06-16 PROCEDURE — 83605 ASSAY OF LACTIC ACID: CPT

## 2019-06-16 PROCEDURE — 87040 BLOOD CULTURE FOR BACTERIA: CPT | Performed by: PHYSICIAN ASSISTANT

## 2019-06-16 PROCEDURE — 84484 ASSAY OF TROPONIN QUANT: CPT | Performed by: PHYSICIAN ASSISTANT

## 2019-06-16 PROCEDURE — 87899 AGENT NOS ASSAY W/OPTIC: CPT | Performed by: NURSE PRACTITIONER

## 2019-06-16 PROCEDURE — 25010000002 VANCOMYCIN: Performed by: PHYSICIAN ASSISTANT

## 2019-06-16 PROCEDURE — 83880 ASSAY OF NATRIURETIC PEPTIDE: CPT | Performed by: PHYSICIAN ASSISTANT

## 2019-06-16 PROCEDURE — 99284 EMERGENCY DEPT VISIT MOD MDM: CPT

## 2019-06-16 PROCEDURE — 93005 ELECTROCARDIOGRAM TRACING: CPT | Performed by: PHYSICIAN ASSISTANT

## 2019-06-16 PROCEDURE — 80053 COMPREHEN METABOLIC PANEL: CPT | Performed by: PHYSICIAN ASSISTANT

## 2019-06-16 PROCEDURE — 25010000002 CEFEPIME IN SWFI 2 GM/10ML IV PUSH SYRINGE (SIMPLE): Performed by: PHYSICIAN ASSISTANT

## 2019-06-16 PROCEDURE — 85025 COMPLETE CBC W/AUTO DIFF WBC: CPT | Performed by: PHYSICIAN ASSISTANT

## 2019-06-16 PROCEDURE — 71046 X-RAY EXAM CHEST 2 VIEWS: CPT

## 2019-06-16 RX ORDER — LEVOTHYROXINE SODIUM 175 MCG
175 TABLET ORAL DAILY
COMMUNITY
Start: 2019-05-28

## 2019-06-16 RX ORDER — GUAIFENESIN AND CODEINE PHOSPHATE 100; 10 MG/5ML; MG/5ML
10 SOLUTION ORAL EVERY 4 HOURS
Status: DISCONTINUED | OUTPATIENT
Start: 2019-06-16 | End: 2019-06-16

## 2019-06-16 RX ORDER — GRANISETRON 3.1 MG/24H
1 PATCH TRANSDERMAL
Refills: 3 | COMMUNITY
Start: 2019-06-05 | End: 2019-06-16

## 2019-06-16 RX ORDER — ANASTROZOLE 1 MG/1
1 TABLET ORAL DAILY
Refills: 6 | COMMUNITY
Start: 2019-05-11

## 2019-06-16 RX ORDER — SODIUM CHLORIDE 0.9 % (FLUSH) 0.9 %
10 SYRINGE (ML) INJECTION AS NEEDED
Status: DISCONTINUED | OUTPATIENT
Start: 2019-06-16 | End: 2019-06-18 | Stop reason: HOSPADM

## 2019-06-16 RX ORDER — SODIUM CHLORIDE 0.9 % (FLUSH) 0.9 %
3-10 SYRINGE (ML) INJECTION AS NEEDED
Status: DISCONTINUED | OUTPATIENT
Start: 2019-06-16 | End: 2019-06-18 | Stop reason: HOSPADM

## 2019-06-16 RX ORDER — GUAIFENESIN AND CODEINE PHOSPHATE 100; 10 MG/5ML; MG/5ML
10 SOLUTION ORAL EVERY 4 HOURS PRN
Status: DISCONTINUED | OUTPATIENT
Start: 2019-06-16 | End: 2019-06-17

## 2019-06-16 RX ORDER — OMEPRAZOLE 40 MG/1
40 CAPSULE, DELAYED RELEASE ORAL DAILY
COMMUNITY
Start: 2019-05-22 | End: 2020-01-09

## 2019-06-16 RX ORDER — LORAZEPAM 0.5 MG/1
0.5 TABLET ORAL EVERY 6 HOURS PRN
Refills: 2 | Status: ON HOLD | COMMUNITY
Start: 2019-05-11 | End: 2020-01-14 | Stop reason: SDUPTHER

## 2019-06-16 RX ORDER — OXYCODONE HYDROCHLORIDE 10 MG/1
10 TABLET ORAL EVERY 4 HOURS PRN
Refills: 0 | COMMUNITY
Start: 2019-05-11 | End: 2020-01-09

## 2019-06-16 RX ORDER — ANASTROZOLE 1 MG/1
TABLET ORAL
COMMUNITY
Start: 2018-08-23 | End: 2019-06-16

## 2019-06-16 RX ORDER — SODIUM CHLORIDE 0.9 % (FLUSH) 0.9 %
3 SYRINGE (ML) INJECTION EVERY 12 HOURS SCHEDULED
Status: DISCONTINUED | OUTPATIENT
Start: 2019-06-16 | End: 2019-06-18 | Stop reason: HOSPADM

## 2019-06-16 RX ADMIN — VANCOMYCIN HYDROCHLORIDE 1250 MG: 10 INJECTION, POWDER, LYOPHILIZED, FOR SOLUTION INTRAVENOUS at 23:05

## 2019-06-16 RX ADMIN — CEFEPIME HYDROCHLORIDE 2 G: 2 INJECTION, POWDER, FOR SOLUTION INTRAVENOUS at 23:00

## 2019-06-16 RX ADMIN — Medication 3 ML: at 23:07

## 2019-06-16 RX ADMIN — Medication 10 ML: at 20:12

## 2019-06-16 NOTE — ED NOTES
Pt c/o productive cough with greenish yellow bloody sputum worsening 5 days with chest discomfort x2 wks and diarrhea today; states seen by Dr Davis 10 days ago, infiltrates found on CXR; [facility] completed Zpack 5 days ago. 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 [facility] [place] [facility] Gtube in place. Daughter at bedside.     Lynnette Patterson RN  06/16/19 1947

## 2019-06-17 PROBLEM — E03.9 HYPOTHYROIDISM: Chronic | Status: ACTIVE | Noted: 2019-06-17

## 2019-06-17 PROBLEM — K21.9 CHRONIC GERD: Status: ACTIVE | Noted: 2019-06-17

## 2019-06-17 PROBLEM — J18.9 PNEUMONIA DUE TO INFECTIOUS ORGANISM: Status: ACTIVE | Noted: 2019-06-17

## 2019-06-17 PROBLEM — I35.0 AORTIC STENOSIS: Status: ACTIVE | Noted: 2019-06-17

## 2019-06-17 PROBLEM — I10 HYPERTENSION: Status: ACTIVE | Noted: 2019-06-17

## 2019-06-17 PROBLEM — Z85.3 HISTORY OF RIGHT BREAST CANCER: Status: ACTIVE | Noted: 2019-06-17

## 2019-06-17 PROBLEM — E03.9 HYPOTHYROIDISM: Status: ACTIVE | Noted: 2019-06-17

## 2019-06-17 PROBLEM — D61.810 PANCYTOPENIA DUE TO CHEMOTHERAPY (HCC): Status: ACTIVE | Noted: 2019-06-17

## 2019-06-17 LAB
CREAT SERPL-MCNC: 0.5 MG/DL (ref 0.4–1)
D-LACTATE SERPL-SCNC: 1.4 MMOL/L (ref 0.5–2.2)
GFR SERPL CREATININE-BSD FRML MDRD: 123 ML/MIN/1.73
HOLD SPECIMEN: NORMAL
L PNEUMO1 AG UR QL IA: NEGATIVE
S PNEUM AG SPEC QL LA: NEGATIVE

## 2019-06-17 PROCEDURE — 99222 1ST HOSP IP/OBS MODERATE 55: CPT | Performed by: INTERNAL MEDICINE

## 2019-06-17 PROCEDURE — 82565 ASSAY OF CREATININE: CPT | Performed by: PHYSICIAN ASSISTANT

## 2019-06-17 PROCEDURE — 25010000002 CEFEPIME PER 500 MG: Performed by: PHYSICIAN ASSISTANT

## 2019-06-17 PROCEDURE — 83605 ASSAY OF LACTIC ACID: CPT

## 2019-06-17 PROCEDURE — 25010000002 CEFEPIME PER 500 MG: Performed by: INTERNAL MEDICINE

## 2019-06-17 PROCEDURE — 94799 UNLISTED PULMONARY SVC/PX: CPT

## 2019-06-17 PROCEDURE — 94640 AIRWAY INHALATION TREATMENT: CPT

## 2019-06-17 PROCEDURE — 99232 SBSQ HOSP IP/OBS MODERATE 35: CPT | Performed by: INTERNAL MEDICINE

## 2019-06-17 PROCEDURE — 25010000002 VANCOMYCIN 1 G RECONSTITUTED SOLUTION 1 EACH VIAL: Performed by: PHYSICIAN ASSISTANT

## 2019-06-17 RX ORDER — LORAZEPAM 0.5 MG/1
0.5 TABLET ORAL EVERY 6 HOURS PRN
Status: DISCONTINUED | OUTPATIENT
Start: 2019-06-17 | End: 2019-06-18 | Stop reason: HOSPADM

## 2019-06-17 RX ORDER — LISINOPRIL 20 MG/1
40 TABLET ORAL EVERY MORNING
Status: DISCONTINUED | OUTPATIENT
Start: 2019-06-17 | End: 2019-06-18 | Stop reason: HOSPADM

## 2019-06-17 RX ORDER — OXYCODONE HYDROCHLORIDE 5 MG/1
10 TABLET ORAL NIGHTLY PRN
Status: DISCONTINUED | OUTPATIENT
Start: 2019-06-17 | End: 2019-06-18 | Stop reason: HOSPADM

## 2019-06-17 RX ORDER — IPRATROPIUM BROMIDE AND ALBUTEROL SULFATE 2.5; .5 MG/3ML; MG/3ML
3 SOLUTION RESPIRATORY (INHALATION) EVERY 4 HOURS PRN
Status: DISCONTINUED | OUTPATIENT
Start: 2019-06-17 | End: 2019-06-18 | Stop reason: HOSPADM

## 2019-06-17 RX ORDER — AMLODIPINE BESYLATE 5 MG/1
5 TABLET ORAL EVERY MORNING
Status: DISCONTINUED | OUTPATIENT
Start: 2019-06-17 | End: 2019-06-18 | Stop reason: HOSPADM

## 2019-06-17 RX ORDER — ANASTROZOLE 1 MG/1
1 TABLET ORAL DAILY
Status: DISCONTINUED | OUTPATIENT
Start: 2019-06-17 | End: 2019-06-18 | Stop reason: HOSPADM

## 2019-06-17 RX ORDER — LEVOTHYROXINE SODIUM 175 UG/1
175 TABLET ORAL
Status: DISCONTINUED | OUTPATIENT
Start: 2019-06-17 | End: 2019-06-18 | Stop reason: HOSPADM

## 2019-06-17 RX ORDER — GUAIFENESIN AND CODEINE PHOSPHATE 100; 10 MG/5ML; MG/5ML
10 SOLUTION ORAL
Status: DISCONTINUED | OUTPATIENT
Start: 2019-06-17 | End: 2019-06-18 | Stop reason: HOSPADM

## 2019-06-17 RX ORDER — ACETYLCYSTEINE 200 MG/ML
2 SOLUTION ORAL; RESPIRATORY (INHALATION) EVERY 12 HOURS SCHEDULED
Status: DISCONTINUED | OUTPATIENT
Start: 2019-06-17 | End: 2019-06-18 | Stop reason: HOSPADM

## 2019-06-17 RX ORDER — SACCHAROMYCES BOULARDII 250 MG
250 CAPSULE ORAL DAILY
Status: DISCONTINUED | OUTPATIENT
Start: 2019-06-17 | End: 2019-06-18 | Stop reason: HOSPADM

## 2019-06-17 RX ORDER — PANTOPRAZOLE SODIUM 40 MG/1
40 TABLET, DELAYED RELEASE ORAL EVERY MORNING
Status: DISCONTINUED | OUTPATIENT
Start: 2019-06-17 | End: 2019-06-18

## 2019-06-17 RX ADMIN — LEVOTHYROXINE SODIUM 175 MCG: 175 TABLET ORAL at 09:33

## 2019-06-17 RX ADMIN — SODIUM CHLORIDE 1000 MG: 900 INJECTION, SOLUTION INTRAVENOUS at 12:24

## 2019-06-17 RX ADMIN — ACETYLCYSTEINE 2 ML: 200 SOLUTION ORAL; RESPIRATORY (INHALATION) at 22:57

## 2019-06-17 RX ADMIN — GUAIFENESIN AND CODEINE PHOSPHATE 10 ML: 10; 100 LIQUID ORAL at 01:12

## 2019-06-17 RX ADMIN — CEFEPIME 2 G: 2 INJECTION, POWDER, FOR SOLUTION INTRAVENOUS at 22:09

## 2019-06-17 RX ADMIN — GUAIFENESIN AND CODEINE PHOSPHATE 10 ML: 10; 100 LIQUID ORAL at 22:10

## 2019-06-17 RX ADMIN — GUAIFENESIN AND CODEINE PHOSPHATE 10 ML: 10; 100 LIQUID ORAL at 17:46

## 2019-06-17 RX ADMIN — AMLODIPINE BESYLATE 5 MG: 5 TABLET ORAL at 09:33

## 2019-06-17 RX ADMIN — GUAIFENESIN AND CODEINE PHOSPHATE 10 ML: 10; 100 LIQUID ORAL at 13:38

## 2019-06-17 RX ADMIN — GUAIFENESIN AND CODEINE PHOSPHATE 10 ML: 10; 100 LIQUID ORAL at 09:29

## 2019-06-17 RX ADMIN — IPRATROPIUM BROMIDE AND ALBUTEROL SULFATE 3 ML: .5; 3 SOLUTION RESPIRATORY (INHALATION) at 22:58

## 2019-06-17 RX ADMIN — PANTOPRAZOLE SODIUM 40 MG: 40 TABLET, DELAYED RELEASE ORAL at 09:33

## 2019-06-17 RX ADMIN — Medication 3 ML: at 09:29

## 2019-06-17 RX ADMIN — Medication 3 ML: at 22:11

## 2019-06-17 RX ADMIN — CEFEPIME 2 G: 2 INJECTION, POWDER, FOR SOLUTION INTRAVENOUS at 09:36

## 2019-06-17 RX ADMIN — LISINOPRIL 40 MG: 20 TABLET ORAL at 09:33

## 2019-06-17 RX ADMIN — ANASTROZOLE 1 MG: 1 TABLET ORAL at 09:41

## 2019-06-17 RX ADMIN — Medication 250 MG: at 09:41

## 2019-06-17 NOTE — ED NOTES
Pt resting in stretcher and denies improvement. Discussed plan of care with pt. Vss. Will continue to monitor. See new orders.      Mariusz Sanchez RN  06/16/19 9824     Yes increase to 50 mcg daily. Will recheck after February visit.

## 2019-06-17 NOTE — PROGRESS NOTES
"Pharmacy Dosing Service  Antibiotic  Vancomycin    68 y.o.female admitted with PNA. Pharmacy was consulted to dose vancomycin.      Assessment/Plan  1. Vancomycin day #1: pt was given 1250 mg (22 mg/kg ABW) IV x1 and then was started on 1000 mg (18 mg/kg ABW) IV every 12 hours. Will obtain peak level 6/18 at 1700 and trough level 6/19 at 0000. Goal trough 10-20 mcg/mL with -600.    2. Cefepime day #1: adjusted dose to 2g IV q12h, appropriate for estCrCl 30-59 mL/min.    3. Pharmacy will continue to pt renal Fx, C&S,drug levels, and clinical status.       Relevant clinical data and objective history reviewed:  165.1 cm (65\")   56.4 kg (124 lb 5.4 oz)   Ideal body weight: 57 kg (125 lb 10.6 oz)    Creatinine   Date Value Ref Range Status   06/17/2019 0.50 0.40 - 1.00 mg/dL Final   06/16/2019 0.60 0.40 - 1.00 mg/dL Final   05/22/2019 0.7 0.4 - 1.0 mg/dl Final   05/21/2019 0.7 0.4 - 1.0 mg/dl Final   05/20/2019 0.8 0.4 - 1.0 mg/dl Final   06/15/2018 0.64 0.57 - 1.00 mg/dL Final   04/18/2018 0.5 (L) 0.7 - 1.5 mg/dL Final     Estimated Creatinine Clearance: 61.6 mL/min (by C-G formula based on SCr of 0.5 mg/dL).  Estimated CrCl using actual body weight and Scr rounded up to 1 for age >66 yo = 50 mL/min    Lab Results   Component Value Date    WBC 3.10 (L) 06/16/2019     Temp Readings from Last 3 Encounters:   06/17/19 98.4 °F (36.9 °C) (Axillary)   06/15/18 98.7 °F (37.1 °C) (Oral)   06/01/18 98.5 °F (36.9 °C) (Oral)     Baseline culture/source/susceptibility:  Microbiology Results (last 10 days)       Procedure Component Value - Date/Time    S. Pneumo Ag Urine or CSF - Urine, Urine, Clean Catch [[id number]]  (Normal) Collected:  06/16/19 1938    Lab Status:  Final result Specimen:  Urine, Clean Catch Updated:  06/17/19 0354     Strep Pneumo Ag Negative    Legionella Antigen, Urine - Urine, Urine, Clean Catch [[id number]]  (Normal) Collected:  06/16/19 1938    Lab Status:  Final result Specimen:  Urine, Clean Catch " Updated:  06/17/19 0355     LEGIONELLA ANTIGEN, URINE Negative          6/16/19 CXR  Impression:     Mild hazy bibasilar airspace opacities, likely atelectasis or perhaps  pneumonia.       Anti-Infectives (From admission, onward)      Ordered     Dose/Rate Route Frequency Start Stop    06/17/19 1427  vancomycin (VANCOCIN) 1,000 mg in sodium chloride 0.9 % 250 mL IVPB     Ordering Provider:  Ab Almanzar, DO    1,000 mg  over 90 Minutes Intravenous Every 12 Hours 06/17/19 2300 06/19/19 1059    06/17/19 1606  cefepime 2 gm IVPB in 100 ml NS (MBP)     Ordering Provider:  Ab Almanzar, DO    2 g  over 4 Hours Intravenous Every 12 Hours 06/17/19 2200 06/23/19 2159    06/16/19 2229  ceFEPime (MAXIPIME) in SWFI 2g/10ml IV PUSH syringe     Ordering Provider:  Timmy Rios PA    2 g  over 5 Minutes Intravenous Once 06/16/19 2234 06/16/19 2305    06/16/19 2229  vancomycin 1250 mg/250 mL 0.9% NS IVPB ([facility])     Ordering Provider:  Timmy Rios PA    20 mg/kg × 57 kg Intravenous Once 06/16/19 2231 06/16/19 2305    06/16/19 2229  Pharmacy to dose vancomycin     Ordering Provider:  Timmy Rios PA     Does not apply Continuous PRN 06/16/19 2227 06/18/19 2226             Enedina Clifton, PharmD, BCPS

## 2019-06-17 NOTE — H&P
[facility]     Lorenza Parham MD    CHIEF COMPLAINT:     Cough with bloody sputum     HISTORY OF PRESENT ILLNESS:    Ms. Sheikh is a 68 year old  female with a past medical history significant for head and neck cancer s/p  glossectomy with reconstruction and radical neck dissection, s/p trach and PEG, chronic HTN, hypothyroidism, s/p breast cancer, GERD, and chronic pain with narcotic dependence.  She reports to the ER tonight with her daughter who provides the history. She states that she after she was discharged May 22nd, she was able to finish 8 days out of 10 of her Omnicef.  She had to stop the antibiotic due to diarrhea.  Her daughter states she continued to take Mucinex and had good control of her sputum production.  She states she then started to back off of the Mucinex and her sputum became very thick and they were unable to get any sputum with trach suctioning.  She also reports some bloody sputum.  She also had a feeding tube changed out last Thursday for a smaller tube.  She states that she has been unable to get her PPI granules through the tube and believes it has contributed to the blood in her sputum. She last had radiation 2 weeks ago.  Labs in the ER were essentially unremarkable with continued neutropenia.     PCP: Lorenza Parham  Oncology: [facility]    Past Medical History:   Diagnosis Date   • Anesthesia complication     developed myalgia after anesthesia 6/2017 then after that anesthesia aware and hadn't had any problems  tete's 4/18/18 note states what anesthesia she got and awaiting [place] anesthesia report. notified anesthea dept at [facility] and they wanted all notes    • Arthritis    • Breast cancer (CMS/HCC)     right breast had chemo & radiation    • Cancer (CMS/HCC)     right breast cancer   • Disease of thyroid gland    • GERD (gastroesophageal reflux disease)     rarely   • Heart murmur    • Hypertension    • Port-A-Cath in place      "left side chest     Past Surgical History:   Procedure Laterality Date   • BREAST LUMPECTOMY WITH AXILLARY NODE DISSECTION Right    • BREAST SURGERY      biopsy   • CARDIAC CATHETERIZATION     • CATARACT EXTRACTION Bilateral    • COLONOSCOPY     • ENDOSCOPY     • HEAD/NECK LESION/CYST EXCISION Right 6/14/2018    Procedure: RT. NECK INCISION AND DRAINAGE;  Surgeon: Toan Reed MD;  Location: [facility] OR;  Service: ENT   • HYSTERECTOMY     • NECK DISSECTION Right 5/31/2018    Procedure: RIGHT MODIFIED NECK DISSECTION;  Surgeon: Toan Reed MD;  Location: [facility] OR [facility];  Service: ENT   • TONGUE LESION EXCISION/BIOPSY Right     x2   • VENOUS ACCESS DEVICE (PORT) INSERTION      left     Family History   Problem Relation Age of Onset   • Malig Hyperthermia Neg Hx      Social History     Tobacco Use   • Smoking status: Never Smoker   • Smokeless tobacco: Never Used   Substance Use Topics   • Alcohol use: No   • Drug use: No       (Not in a hospital admission)  Allergies:  Sulfa antibiotics and Succinylcholine      There is no immunization history on file for this patient.        REVIEW OF SYSTEMS:     Review of Systems   Respiratory: Positive for cough, hemoptysis and sputum production.        Vital Signs  Temp:  [98.2 °F (36.8 °C)] 98.2 °F (36.8 °C)  Heart Rate:  [] 95  Resp:  [18] 18  BP: (116)/(70) 116/70    Flowsheet Rows      First Filed Value   Admission Height  165.1 cm (65\") Documented at 06/16/2019 1901   Admission Weight  57 kg (125 lb 10.6 oz) Documented at 06/16/2019 1901           Physical Exam:    Physical Exam   Constitutional:   Appears chronically ill   HENT:   Head: Normocephalic.        Eyes: EOM are normal. Pupils are equal, round, and reactive to light.   Neck:   Trach in place   Cardiovascular: Normal rate and regular rhythm.   Pulmonary/Chest: She has rales.   Abdominal: Soft.   PEG tube in place   Musculoskeletal: Normal range of motion.   Neurological: She is alert.   Skin: " Skin is warm.   Healing wounds to right neck           Results Review:    I reviewed the patient's new clinical results.  Lab Results (most recent)     Procedure Component Value Units Date/Time    BNP [[id number]]  (Normal) Collected:  06/16/19 2007    Specimen:  Blood Updated:  06/16/19 2124     BNP 25.0 pg/mL      Comment: Results may be falsely decreased if patient taking Biotin.       [place] Draw [[id number]] Collected:  06/16/19 2007    Specimen:  Blood Updated:  06/16/19 2115    Narrative:       The following orders were created for panel order [place] Draw.  Procedure                               Abnormality         Status                     ---------                               -----------         ------                     Light Blue Top[[id number]]                                   Final result               Green Top (Gel)[[id number]]                                  Final result               Lavender Top[[id number]]                                     Final result               Gold Top - SST[[id number]]                                   Final result                 Please view results for these tests on the individual orders.    Light Blue Top [[id number]] Collected:  06/16/19 2007    Specimen:  Blood Updated:  06/16/19 2115     Extra Tube hold for add-on     Comment: Auto resulted       Green Top (Gel) [[id number]] Collected:  06/16/19 2007    Specimen:  Blood Updated:  06/16/19 2115     Extra Tube Hold for add-ons.     Comment: Auto resulted.       Lavender Top [[id number]] Collected:  06/16/19 2007    Specimen:  Blood Updated:  06/16/19 2115     Extra Tube hold for add-on     Comment: Auto resulted       Gold Top - SST [[id number]] Collected:  06/16/19 2007    Specimen:  Blood Updated:  06/16/19 2115     Extra Tube Hold for add-ons.     Comment: Auto resulted.       Troponin [[id number]]  (Normal) Collected:  06/16/19 2007    Specimen:  Blood Updated:  06/16/19 2053     Troponin I <0.030 ng/mL      Narrative:       Troponin I Reference Range:    0.00-0.03  Negative.  Repeat testing in 4-6 hours if clinically indicated.    0.04-0.29  Suspicious for myocardial injury. Serial measurements and clinical  correlation may be necessary to confirm or exclude diagnosis of acute  coronary syndrome.  Repeat testing in 4-6 hours if indicated.     >0.29 Consistent with myocardial injury.  Recommend clinical and laboratory correlation.     Results my be falsely decreased if patient taking Biotin.     CBC & Differential [[id number]] Collected:  06/16/19 2007    Specimen:  Blood Updated:  06/16/19 2048    Narrative:       The following orders were created for panel order CBC & Differential.  Procedure                               Abnormality         Status                     ---------                               -----------         ------                     CBC Auto Differential[[id number]]        Abnormal            Final result                 Please view results for these tests on the individual orders.    CBC Auto Differential [[id number]]  (Abnormal) Collected:  06/16/19 2007    Specimen:  Blood Updated:  06/16/19 2048     WBC 3.10 10*3/mm3      RBC 3.06 10*6/mm3      Hemoglobin 8.8 g/dL      Hematocrit 26.5 %      MCV 86.5 fL      MCH 28.9 pg      MCHC 33.4 g/dL      RDW 22.5 %      RDW-SD 67.4 fl      MPV 7.7 fL      Platelets 254 10*3/mm3      Neutrophil % 64.9 %      Lymphocyte % 19.8 %      Monocyte % 14.6 %      Eosinophil % 0.4 %      Basophil % 0.3 %      Neutrophils, Absolute 2.00 10*3/mm3      Lymphocytes, Absolute 0.60 10*3/mm3      Monocytes, Absolute 0.50 10*3/mm3      Eosinophils, Absolute 0.00 10*3/mm3      Basophils, Absolute 0.00 10*3/mm3      nRBC 0.3 /100 WBC     Comprehensive Metabolic Panel [[id number]]  (Abnormal) Collected:  06/16/19 2007    Specimen:  Blood Updated:  06/16/19 2048     Glucose 109 mg/dL      BUN 19 mg/dL      Creatinine 0.60 mg/dL      Sodium 136 mmol/L      Potassium 3.9 mmol/L       Chloride 95 mmol/L      CO2 27.0 mmol/L      Calcium 9.1 mg/dL      Total Protein 6.4 g/dL      Albumin 3.20 g/dL      ALT (SGPT) 14 U/L      AST (SGOT) 19 U/L      Alkaline Phosphatase 83 U/L      Total Bilirubin 0.4 mg/dL      eGFR Non African Amer 100 mL/min/1.73      Globulin 3.2 gm/dL      A/G Ratio 1.0 g/dL      BUN/Creatinine Ratio 31.7     Anion Gap 14.0 mmol/L     Urinalysis With Culture If Indicated - Urine, Clean Catch [[id number]]  (Normal) Collected:  06/16/19 1938    Specimen:  Urine, Clean Catch Updated:  06/16/19 1946     Color, UA Yellow     Appearance, UA Clear     pH, UA 7.5     Specific Gravity, UA 1.010     Glucose, UA Negative     Ketones, UA Negative     Bilirubin, UA Negative     Blood, UA Negative     Protein, UA Negative     Leuk Esterase, UA Negative     Nitrite, UA Negative     Urobilinogen, UA 0.2 E.U./dL    Narrative:       Urine microscopic not indicated.          Imaging Results (most recent)     Procedure Component Value Units Date/Time    XR Chest 2 View [[id number]] Collected:  06/1950     Updated:  06/1950    Narrative:       DATE OF EXAM:  6/16/2019 7:38 PM     PROCEDURE:  XR CHEST 2 VW-     INDICATIONS:  CHF/COPD Protocol, chest pain     COMPARISON:  Portable chest x-ray 06/07/2019     TECHNIQUE:   Two radiologic views of the chest , PA and lateral were obtained.     FINDINGS:  Tip of the tracheostomy tube terminates above the adi. Tip of the  left subclavian central venous port catheter is stable. Cardiac  silhouette does not appear enlarged. There are mild hazy bibasilar  airspace opacities. No pneumothorax or pleural effusion is seen. There  are degenerative changes in the mid to lower thoracic spine.        Impression:       Mild hazy bibasilar airspace opacities, likely atelectasis or perhaps  pneumonia.     Electronically Signed By-Kerrie Paredes On:6/16/2019 7:52 PM  This report was finalized on [id number] by  Kerrie Paredes, .     "    reviewed    ECG/EMG Results (most recent)     Procedure Component Value Units Date/Time    ECG 12 Lead [[id number]] Collected:  06/16/19 2009     Updated:  06/16/19 2011    Narrative:       HEART RATE= 88  bpm  RR Interval= 684  ms  KS Interval= 109  ms  P Horizontal Axis= 5  deg  P Front Axis= 68  deg  QRSD Interval= 113  ms  QT Interval= 389  ms  QRS Axis= -38  deg  T Wave Axis= 36  deg  - ABNORMAL ECG -  Sinus rhythm  Incomplete RBBB and LAFB  Electronically Signed By:   Date and Time of Study: 2019-06-16 20:09:33        reviewed    XR Chest 2 View  Narrative: DATE OF EXAM:  6/16/2019 7:38 PM     PROCEDURE:  XR CHEST 2 VW-     INDICATIONS:  CHF/COPD Protocol, chest pain     COMPARISON:  Portable chest x-ray 06/07/2019     TECHNIQUE:   Two radiologic views of the chest , PA and lateral were obtained.     FINDINGS:  Tip of the tracheostomy tube terminates above the adi. Tip of the  left subclavian central venous port catheter is stable. Cardiac  silhouette does not appear enlarged. There are mild hazy bibasilar  airspace opacities. No pneumothorax or pleural effusion is seen. There  are degenerative changes in the mid to lower thoracic spine.      Impression: Mild hazy bibasilar airspace opacities, likely atelectasis or perhaps  pneumonia.     Electronically Signed By-Kerrie Paredes On:6/16/2019 7:52 PM  This report was finalized on [id number] by  Kerrie Paredes, .      Assessment/Plan     ?Continued PNA  -CXR showed \"Mild hazy bibasilar airspace opacities, likely atelectasis or perhaps pneumonia.\"  Started on Cefepime and Vanc.  -Check pneumococcal and legionella urine   -Check respiratory viral panel  -Follow up blood cultures  -Trach care  -Mucinex q4 per tube    Oral cancer with radical neck dissection and glossectomy with reconstruction s/p PEG and trach  -PEG changed out last Thursday  -Consult Oncology   -Continue Ativan    H/o Breast cancer  -Continue Arimidex     Neutropenia   -WBC 3.1 "     Normocytic anemia  -Hgb 8.8, at patient's baseline     Chronic hypertension,controlled  -Continue Norvasc and Lisinopril    GERD  -Continue PPI     Hypothyroidism  -Continue Levothyroxine    DVT Prophylaxis  -Bilateral SCD's       Disposition    Inpatient for management of possible continued pneumonia.     I discussed the patients findings and my recommendations with patient and daughter.     Julia Harvey-Kwame, APRN  06/16/19  10:48 PM    Time: More than 50% of time spent in counseling and coordination of care:  Total face-to-face/floor time 10 min.  Time spent in counseling 10 min. Counseling included the following topics: Mangement on pneumonia and oncology consult

## 2019-06-17 NOTE — CONSULTS
"Adult Nutrition  Assessment/PES    Patient Name:  Elma Sheikh  YOB: 1950  MRN: [id number]  Admit Date:  6/16/2019    Assessment Date:  6/17/2019    Comments:  Ordering home tube feed Isosource 1.5 500mL bolus feed TID to run over 2 hours + 300mL water flush after each feed    Reason for Assessment     Row Name 06/17/19 1410          Reason for Assessment    Reason For Assessment  TF/PN Medical hx: head & neck CA s/p glossectomy c reconstruction & radical neck dissection, s/p trach and PEG, HTN, breast CA c chemo/radiation, GERD     Diagnosis  -- current problems: Continued pna s/p outpatient tx. PEG changed out 6/13         Nutrition/Diet History     Row Name 06/17/19 1412          Nutrition/Diet History    Typical Food/Fluid Intake  Patient is on home tube feed Isosource 1.5 2 cans(500mL) per day with 300mL water flush after each feed          Anthropometrics     Row Name 06/17/19 1414          Anthropometrics    Height  165.1 cm (65\")        Ideal Body Weight (IBW)    Ideal Body Weight (IBW) (kg)  57.29        Current Body weight: 124# (6/16/19); admit wt 125# (6/16/19)          Wt hx: 130# (6/18)                      Labs/Tests/Procedures/Meds     Row Name 06/17/19 1413          Labs/Procedures/Meds    Lab Results Reviewed  reviewed Na+/K+ wnl glucose 109 BUN/crt wnl hgb 8.8 L        Medications    Pertinent Medications Reviewed  reviewed protonix, florastor, vanc         Physical Findings     Row Name 06/17/19 1414          Physical Findings    Skin  -- skin intact         Estimated/Assessed Needs     Row Name 06/17/19 1414          Calculation Measurements    Weight Used For Calculations  56.2 kg (124 lb)     Height  165.1 cm (65\")        Estimated/Assessed Needs    Additional Documentation  KCAL/KG (Group);Fluid Requirements (Group);Protein Requirements (Group)        KCAL/KG    KCAL/KG  35 Kcal/Kg (kcal)     35 Kcal/Kg (kcal)  1968.61        Protein Requirements    Weight Used For " "Protein Calculations  56.2 kg (124 lb)     Est Protein Requirement Amount (gms/kg)  1.5 gm protein     Estimated Protein Requirements (gms/day)  84.37        Fluid Requirements    Estimated Fluid Requirements (mL/day)  1968         Nutrition Prescription Ordered     Row Name 06/17/19 1416          Nutrition Prescription PO    Current PO Diet  NPO Isosource 1.5 500ml bolus feed TID + 300ml h20 flush after each feed provides 2250kcals (114%), 102gPRO (121%), 2040ml free h20        Nutrition Prescription EN    Enteral Route  PEG     Product  Isosource 1.5 (Jevity 1.5)     TF Delivery Method  Bolus     TF Bolus Current Volume (mL)  500 mL     TF Bolus Frequency  3 times a day     TF Bolus Cycle Over  -- 120 minutes     Water flush (mL)   300 mL     Water Flush Frequency  Every feeding         Evaluation of Received Nutrient/Fluid Intake     Row Name 06/17/19 1414          Calculation Measurements    Weight Used For Calculations  56.2 kg (124 lb)     Height  165.1 cm (65\")         Evaluation of Prescribed Nutrient/Fluid Intake     Row Name 06/17/19 1414          Calculation Measurements    Weight Used For Calculations  56.2 kg (124 lb)     Height  165.1 cm (65\")             Problem/Interventions:  Problem 1     Row Name 06/17/19 1423          Nutrition Diagnoses Problem 1    Problem 1  -- Inadequate oral intake     Etiology (related to)  -- decreased ability to consume sufficient energy     Signs/Symptoms (evidenced by)  -- PEG needed for adeequate nutrition                 Intervention Goal     Row Name 06/17/19 1424          Intervention Goal    TF/PN  Tolerate TF at goal;Deliver (%) goal     Deliver % of Goal  90 %         Nutrition Intervention     Row Name 06/17/19 1424          Nutrition Intervention    RD/Tech Action  -- Ordering tube feed Isosource 1.5 500ml bolus TID + 300ml h20 flush after each feed         Nutrition Prescription     Row Name 06/17/19 1425          Nutrition Prescription EN    Enteral " Prescription  Enteral begin/change     Enteral Route  PEG     Product  Isosource 1.5 roseline     TF Delivery Method  Bolus     TF Bolus Starting Volume (mL)  500 mL     TF Bolus Frequency  3 times a day     TF Bolus Cycle Over  Other (comment) 120 minutes     Water flush (mL)   300 mL     Water Flush Frequency  -- TID - after each feed             Electronically signed by:  Shannen Duff RD  06/17/19 2:27 PM

## 2019-06-17 NOTE — PROGRESS NOTES
Pharmacokinetic Consult - Vancomycin Dosing    Elma Sheikh is a 68 y.o. female who has been consulted for vancomycin dosing for pneumonia .    Relevant clinical data and objective history reviewed:  Lab Results   Component Value Date/Time    CREATININE 0.60 06/16/2019 08:07 PM    CREATININE 0.7 05/22/2019 04:00 AM    CREATININE 0.7 05/21/2019 06:00 AM    CREATININE 0.8 05/20/2019 03:15 AM    CREATININE 0.64 06/15/2018 04:23 AM    CREATININE 0.68 05/23/2018 11:12 AM    CREATININE 0.5 (L) 04/18/2018 12:49 PM    BUN 19 06/16/2019 08:07 PM    BUN 17 05/22/2019 04:00 AM    BUN 16 05/21/2019 06:00 AM    BUN 18 05/20/2019 03:15 AM    BUN 9 06/15/2018 04:23 AM    BUN 13 05/23/2018 11:12 AM    BUN 12 04/18/2018 12:49 PM     Estimated Creatinine Clearance: 62.2 mL/min (by C-G formula based on SCr of 0.6 mg/dL).  No intake/output data recorded.  Lab Results   Component Value Date/Time    WBC 3.10 (L) 06/16/2019 08:07 PM    WBC 3.36 (L) 04/18/2018 12:49 PM    HGB 8.8 (L) 06/16/2019 08:07 PM    HGB 11.9 (L) 04/18/2018 12:49 PM    HCT 26.5 (L) 06/16/2019 08:07 PM    HCT 36.0 04/18/2018 12:49 PM    MCV 86.5 06/16/2019 08:07 PM    MCV 88.5 04/18/2018 12:49 PM     06/16/2019 08:07 PM     04/18/2018 12:49 PM     Temp Readings from Last 3 Encounters:   06/16/19 98.2 °F (36.8 °C) (Oral)   06/15/18 98.7 °F (37.1 °C) (Oral)   06/01/18 98.5 °F (36.9 °C) (Oral)     Weight: 57 kg (125 lb 10.6 oz)      Assessment/Plan  The patient will be started on vancomycin utilizing scheduled dosing based on actual body weight.  Baseline risks associated with therapy include: advanced age.  Gave 1250mg loading dose.  Will initiate dose at 1000 mg IV every 12 hours.  Pharmacy will also follow closely for s/sx of nephrotoxicity and infusion reactions.  Serum creatinine will be ordered per policy.  Plan for peak and trough as patient approaches steady state, prior to the 4th dose.  Peak 6/18 at 0200 and trough 6/18 at 1000.  Due to  infection severity, will target a trough of 15-20 ug/mL.  Pharmacy will continue to follow the patient’s culture results and clinical progress daily.    Alex Alcantar, PharmD

## 2019-06-17 NOTE — PLAN OF CARE
Problem: Patient Care Overview  Goal: Plan of Care Review  Outcome: Ongoing (interventions implemented as appropriate)      Problem: Pneumonia (Adult)  Goal: Signs and Symptoms of Listed Potential Problems Will be Absent, Minimized or Managed (Pneumonia)  Outcome: Ongoing (interventions implemented as appropriate)

## 2019-06-17 NOTE — PLAN OF CARE
Problem: Patient Care Overview  Goal: Plan of Care Review  Outcome: Ongoing (interventions implemented as appropriate)    Goal: Individualization and Mutuality  Outcome: Ongoing (interventions implemented as appropriate)    Goal: Discharge Needs Assessment  Outcome: Ongoing (interventions implemented as appropriate)    Goal: Interprofessional Rounds/Family Conf  Outcome: Ongoing (interventions implemented as appropriate)      Problem: Pneumonia (Adult)  Goal: Signs and Symptoms of Listed Potential Problems Will be Absent, Minimized or Managed (Pneumonia)  Outcome: Ongoing (interventions implemented as appropriate)

## 2019-06-17 NOTE — ED PROVIDER NOTES
Subjective   History of Present Illness  Patient is a 68-year-old female PMH significant for tongue cancer, GERD, hypertension who presents with worsening chest pain, cough, sputum for the past 10 days.  Patient's daughter reports the patient did get a chest x-ray a few days ago which showed some lung infiltrates.  Patient was put on azithromycin she states her sputum was looking better but since she has been done with the azithromycin symptoms have gotten worse.  She reports some hemoptysis earlier today.  Patient does have a trach and is fed via G-tube.  She is currently going through chemotherapy.  Patient reports substernal chest pain that does not radiate.  Patient states it is worse with cough better with rest.  Review of Systems   Constitutional: Positive for fatigue. Negative for activity change, fever and unexpected weight change.   HENT: Positive for congestion, rhinorrhea, sinus pressure and trouble swallowing.    Eyes: Negative.    Respiratory: Positive for cough, chest tightness, shortness of breath and wheezing.    Cardiovascular: Positive for chest pain. Negative for leg swelling.   Gastrointestinal: Negative.    Endocrine: Negative.    Genitourinary: Negative.    Musculoskeletal: Positive for arthralgias. Negative for gait problem and joint swelling.   Skin: Negative.    Allergic/Immunologic: Positive for immunocompromised state.   Neurological: Negative for dizziness, seizures, syncope, weakness, light-headedness and numbness.   Psychiatric/Behavioral: Negative.           Medication List      ASK your doctor about these medications    amLODIPine 5 MG tablet  Commonly known as:  NORVASC     anastrozole 1 MG tablet  Commonly known as:  ARIMIDEX     hydroxychloroquine 200 MG tablet  Commonly known as:  PLAQUENIL     lisinopril 40 MG tablet  Commonly known as:  PRINIVIL,ZESTRIL     LORazepam 0.5 MG tablet  Commonly known as:  ATIVAN     omeprazole 40 MG capsule  Commonly known as:  priLOSEC      oxyCODONE 10 MG tablet  Commonly known as:  ROXICODONE     SANCUSO 3.1 MG/24HR  Generic drug:  granisetron     SYNTHROID 175 MCG tablet  Generic drug:  levothyroxine          Past Medical History:   Diagnosis Date   • Anesthesia complication     developed myalgia after anesthesia 6/2017 then after that anesthesia aware and hadn't had any problems  tete's 4/18/18 note states what anesthesia she got and awaiting [facility] anesthesia report. notified anesthea dept at [facility] and they wanted all notes    • Arthritis    • Breast cancer (CMS/HCC)     right breast had chemo & radiation    • Cancer (CMS/HCC)     right breast cancer   • Disease of thyroid gland    • GERD (gastroesophageal reflux disease)     rarely   • Heart murmur    • Hypertension    • Port-A-Cath in place     left side chest       Allergies   Allergen Reactions   • Sulfa Antibiotics Swelling     Face, neck, trouble swallowing   • Succinylcholine Other (See Comments)     extreme muscle weakness        Past Surgical History:   Procedure Laterality Date   • BREAST LUMPECTOMY WITH AXILLARY NODE DISSECTION Right    • BREAST SURGERY      biopsy   • CARDIAC CATHETERIZATION     • CATARACT EXTRACTION Bilateral    • COLONOSCOPY     • ENDOSCOPY     • HEAD/NECK LESION/CYST EXCISION Right 6/14/2018    Procedure: RT. NECK INCISION AND DRAINAGE;  Surgeon: Toan Reed MD;  Location: [facility] OR;  Service: ENT   • HYSTERECTOMY     • NECK DISSECTION Right 5/31/2018    Procedure: RIGHT MODIFIED NECK DISSECTION;  Surgeon: Toan Reed MD;  Location: [facility];  Service: ENT   • TONGUE LESION EXCISION/BIOPSY Right     x2   • VENOUS ACCESS DEVICE (PORT) INSERTION      left       Family History   Problem Relation Age of Onset   • Malig Hyperthermia Neg Hx        Social History     Socioeconomic History   • Marital status:      Spouse name: Not on file   • Number of children: Not on file   • Years of education: Not on file   • Highest education  level: Not on file   Tobacco Use   • Smoking status: Never Smoker   • Smokeless tobacco: Never Used   Substance and Sexual Activity   • Alcohol use: No   • Drug use: No   • Sexual activity: Defer           Objective   Physical Exam   Constitutional: She is oriented to person, place, and time. She appears well-developed and well-nourished.  Non-toxic appearance. She appears ill. No distress.   HENT:   Head: Normocephalic and atraumatic. Head is without raccoon's eyes, without Weathers's sign and without abrasion.   Patient has trach placed with no surrounding erythema noted.  Patient's tongue is protruding due to recent surgery in February of tongue reconstruction.   Eyes: EOM are normal. Pupils are equal, round, and reactive to light.   Cardiovascular: Intact distal pulses. An irregular rhythm present. Exam reveals no gallop and no friction rub.   Murmur heard.  Pulmonary/Chest: Accessory muscle usage and stridor present. She has wheezes. She has rales. She exhibits tenderness.   Abdominal: Soft. She exhibits no distension. There is no tenderness. There is no guarding.   Neurological: She is alert and oriented to person, place, and time. No cranial nerve deficit.   Skin: Skin is warm and dry.       Procedures           ED Course      Medications   sodium chloride 0.9 % flush 10 mL (10 mL Intravenous Given 6/16/19 2012)   ceFEPime (MAXIPIME) in SWFI 2g/10ml IV PUSH syringe (2 g Intravenous Given 6/16/19 2300)     And   vancomycin 1250 mg/250 mL 0.9% NS IVPB ([facility]) (1,250 mg Intravenous New Bag 6/16/19 2305)     And   Pharmacy to dose vancomycin (not administered)   cefepime 2 gm IVPB in 100 ml NS (MBP) (not administered)   vancomycin (VANCOCIN) 1,000 mg in sodium chloride 0.9 % 250 mL IVPB (not administered)   sodium chloride 0.9 % flush 3 mL (3 mL Intravenous Given 6/16/19 2307)   sodium chloride 0.9 % flush 3-10 mL (not administered)   guaifenesin-codeine (GUAIFENESIN AC) 100-10 MG/5ML liquid 10 mL (not administered)      Labs Reviewed   COMPREHENSIVE METABOLIC PANEL - Abnormal; Notable for the following components:       Result Value    Glucose 109 (*)     Chloride 95 (*)     Albumin 3.20 (*)     BUN/Creatinine Ratio 31.7 (*)     All other components within normal limits   CBC WITH AUTO DIFFERENTIAL - Abnormal; Notable for the following components:    WBC 3.10 (*)     RBC 3.06 (*)     Hemoglobin 8.8 (*)     Hematocrit 26.5 (*)     RDW 22.5 (*)     RDW-SD 67.4 (*)     Monocyte % 14.6 (*)     Lymphocytes, Absolute 0.60 (*)     nRBC 0.3 (*)     All other components within normal limits   BNP (IN-HOUSE) - Normal   TROPONIN (IN-HOUSE) - Normal    Narrative:     Troponin I Reference Range:    0.00-0.03  Negative.  Repeat testing in 4-6 hours if clinically indicated.    0.04-0.29  Suspicious for myocardial injury. Serial measurements and clinical  correlation may be necessary to confirm or exclude diagnosis of acute  coronary syndrome.  Repeat testing in 4-6 hours if indicated.     >0.29 Consistent with myocardial injury.  Recommend clinical and laboratory correlation.     Results my be falsely decreased if patient taking Biotin.    URINALYSIS W/ CULTURE IF INDICATED - Normal    Narrative:     Urine microscopic not indicated.   BLOOD CULTURE   BLOOD CULTURE   STREP PNEUMO AG, URINE OR CSF   LEGIONELLA ANTIGEN, URINE   RAINBOW DRAW    Narrative:     The following orders were created for panel order [place] Draw.  Procedure                               Abnormality         Status                     ---------                               -----------         ------                     Light Blue Top[[id number]]                                   Final result               Green Top (Gel)[[id number]]                                  Final result               Lavender Top[[id number]]                                     Final result               Gold Top - SST[[id number]]                                   Final result                 Please  view results for these tests on the individual orders.   LACTIC ACID, PLASMA   CBC AND DIFFERENTIAL    Narrative:     The following orders were created for panel order CBC & Differential.  Procedure                               Abnormality         Status                     ---------                               -----------         ------                     CBC Auto Differential[[id number]]        Abnormal            Final result                 Please view results for these tests on the individual orders.   LIGHT BLUE TOP   GREEN TOP   LAVENDER TOP   GOLD TOP - SST   Xr Chest 2 View    Result Date: 6/16/2019  Narrative: DATE OF EXAM: 6/16/2019 7:38 PM  PROCEDURE: XR CHEST 2 VW-  INDICATIONS: CHF/COPD Protocol, chest pain  COMPARISON: Portable chest x-ray 06/07/2019  TECHNIQUE: Two radiologic views of the chest , PA and lateral were obtained.  FINDINGS: Tip of the tracheostomy tube terminates above the adi. Tip of the left subclavian central venous port catheter is stable. Cardiac silhouette does not appear enlarged. There are mild hazy bibasilar airspace opacities. No pneumothorax or pleural effusion is seen. There are degenerative changes in the mid to lower thoracic spine.      Impression: Mild hazy bibasilar airspace opacities, likely atelectasis or perhaps pneumonia.  Electronically Signed By-Kerrie Paredes On:6/16/2019 7:52 PM This report was finalized on [id number] by  Kerrie Paredes, .    Xr Chest 2 View    Result Date: 6/7/2019  Narrative: DATE OF SERVICE: 6/7/2019 11:00 AM PROCEDURE: XR CHEST 2 VIEWS HISTORY: Cough , history right breast cancer, tongue cancer COMPARISON: 05/18/2019 TECHNIQUE: Two radiologic views of the chest, PA and lateral were obtained. DISCUSSION: Lungs appear emphysematous.  Heart size and mediastinal and hilar contours are within normal.  There is a tracheostomy.  Infusaport tip is in the area of the upper SVC, unchanged.  Lungs appear clear on the frontal view, with interval  clearing right basal opacities.  On the lateral view, there is a small area of airspace opacity in 1 of the upper lobes in the retrosternal space.  There is mild degenerative spurring of the thoracic spine. IMPRESSION: 1. Mild upper lobe infiltrate on lateral view. Marianna Licea MD DS: 06/07/2019 11:36 Report ID: [id number] cc: RYANNE BRIGGS TERESA ARREOLASHERRY BRIGITTE FINAL AUTHENTICATED COPY     Xr Chest 1 View    Result Date: 5/19/2019  Narrative: DATE OF SERVICE: 5/18/2019 9:48 PM PROCEDURE: XR CHEST PORTABLE 1 VIEW HISTORY: Fever patient is a 68-year-old female with cough, fever chills, chest, high blood pressure History of breast and tongue cancer. COMPARISON: Study of April 28, 2019 TECHNIQUE: A radiologic portable AP view of the chest was obtained. DISCUSSION: Today's portable upright chest was obtained on May 18, 2019 at 2147 hr. Tracheostomy tube is in good position.  Left-sided [id number] is in good position.  The heart size is normal.  There are mild prominent markings in the right lung base felt to represent mild atelectasis no air bronchograms are seen that would suggest pneumonia.  The left lung appears free of disease. There is no pleural effusion.  The bony structures are intact.  The upper abdomen is unremarkable. IMPRESSION: 1. Mild right basilar atelectasis 2. ET tube and left-sided [id number] in good position 3. No evidence of pneumonia or failure 4. Mild change from study of April 28, 2019 Edmund Dave Jr., MD DS: 05/19/2019 07:07 Report ID: [id number] cc: CORINNA CARDONA FINAL AUTHENTICATED COPY       While in the ED IV was placed patient was given above medications.  All lab results were reviewed.  CBC from 3 weeks ago shows WBC of 7.7, RBC of 3.70, Hgb of 10.2, HCT of 30.4  CMP shows sodium of 139, potassium 3.0, chloride 97, glucose 105, BUN 17, creatinine 0.7/decrease in WBC from previous labs are likely due to answer treatment.    Chest x-ray from today shows central evidence  of pneumonia.  Due to patient's HPI pneumonia is likely.  Patient's chest pain is also likely due to pneumonia but cardiac cause cannot be ruled out.  troponin being within normal limits.  EKG was unremarkable reviewed by myself and Dr. Webb.  Shows normal sinus rhythm with incomplete right bundle branch block was reviewed with old EKG with similar findings.  Patient did trigger sepsis due to white count being below 4.  Patient was started on antibiotics while in the ED. lab results and findings were discussed with patient and family at bedside who voiced understanding of admission for further duration of follow-up.  Spoke to Julia LIZ for Dr. Almanzar who agreed for admission.  She was stable throughout ED stay.              MDM  Number of Diagnoses or Management Options  Chest pain, unspecified type:   Hemoptysis:   Pneumonia due to infectious organism, unspecified laterality, unspecified part of lung:      Amount and/or Complexity of Data Reviewed  Clinical lab tests: reviewed and ordered  Tests in the radiology section of CPT®: reviewed and ordered  Tests in the medicine section of CPT®: reviewed and ordered  Obtain history from someone other than the patient: yes  Review and summarize past medical records: yes  Discuss the patient with other providers: yes    Risk of Complications, Morbidity, and/or Mortality  Presenting problems: moderate  Diagnostic procedures: moderate  Management options: moderate    Patient Progress  Patient progress: stable        Final diagnoses:   Pneumonia due to infectious organism, unspecified laterality, unspecified part of lung   Hemoptysis   Chest pain, unspecified type            Timmy Rios PA  06/16/19 0304

## 2019-06-17 NOTE — PROGRESS NOTES
"Hospitalist Team      Patient Care Team:  Lorenza Parham MD as PCP - General  Lorenza Parham MD as PCP - Family Medicine        Chief Complaint:  Cough with bloody sputum           Subjective    Reports still having thick mucous with a difficult time expectorating from her trach. No blood specks in sputum however. No fevers.     Review of Systems   Respiratory: Positive for cough and shortness of breath.    All other systems reviewed and are negative.      Objective    Vital Signs  Temp:  [98.1 °F (36.7 °C)-98.4 °F (36.9 °C)] 98.4 °F (36.9 °C)  Heart Rate:  [] 95  Resp:  [14-20] 18  BP: (116-131)/(70-75) 129/75  Oxygen Therapy  SpO2: 97 %  Pulse Oximetry Type: Intermittent  Device (Oxygen Therapy): room air  Flowsheet Rows      First Filed Value   Admission Height  165.1 cm (65\") Documented at 06/16/2019 1901   Admission Weight  57 kg (125 lb 10.6 oz) Documented at 06/16/2019 1901        Intake & Output (last 3 days)       06/14 0701 - 06/15 0700 06/15 0701 - 06/16 0700 06/16 0701 - 06/17 0700 06/17 0701 - 06/18 0700    I.V. (mL/kg)    70 (1.2)    Other    60    NG/GT    1060    IV Piggyback    350    Total Intake(mL/kg)    1540 (27.3)    Urine (mL/kg/hr)   450 300 (0.6)    Stool    0    Total Output   450 300    Net   -450 +1240            Stool Unmeasured Occurrence    1 x        Lines, Drains & Airways    Active LDAs     Name:   Placement date:   Placement time:   Site:   Days:    Peripheral IV 06/16/19 Left    06/16/19    --    -- chest   1    Gastrostomy/Enterostomy LLQ   06/01/19    0301    LLQ   16    Single Lumen Implantable Port 06/14/18 Left Chest   06/14/18    1456    Chest   368                Physical Exam:  Gen: chronically ill  HEENT: trach in place  Heart: RRR  Lung: CTA b/l, adequate air movement  ABD: soft, NT, peg in place  MSK: moves ext spontaneously  Neuro: AO x 3  Psych: flat affect   Skin: warm, dry, intact  Extremities:  No edema    Results Review:     I reviewed the patient's " new clinical results.    Lab Results (last 24 hours)     Procedure Component Value Units Date/Time    Creatinine, Serum [[id number]]  (Normal) Collected:  06/17/19 0436    Specimen:  Blood Updated:  06/17/19 0526     Creatinine 0.50 mg/dL      eGFR Non African Amer 123 mL/min/1.73     Lactic Acid, Reflex [[id number]]  (Normal) Collected:  06/17/19 0436    Specimen:  Blood Updated:  06/17/19 0525     Lactate 1.4 mmol/L     Legionella Antigen, Urine - Urine, Urine, Clean Catch [[id number]]  (Normal) Collected:  06/16/19 1938    Specimen:  Urine, Clean Catch Updated:  06/17/19 0355     LEGIONELLA ANTIGEN, URINE Negative    S. Pneumo Ag Urine or CSF - Urine, Urine, Clean Catch [[id number]]  (Normal) Collected:  06/16/19 1938    Specimen:  Urine, Clean Catch Updated:  06/17/19 0354     Strep Pneumo Ag Negative    Lactic Acid, Reflex Timer (This will reflex a repeat order 3-3:15 hours after ordered.) [[id number]] Collected:  06/16/19 2314    Specimen:  Blood Updated:  06/17/19 0230     Extra Tube Hold for add-ons.     Comment: Auto resulted.       POC Lactate [[id number]]  (Normal) Collected:  06/16/19 2314    Specimen:  Blood Updated:  06/16/19 2319     Lactate 2.20 mmol/L      Comment: Serial Number: [id number]:  [id number]       Blood Culture - Blood, Chest, Left [[id number]] Collected:  06/16/19 2253    Specimen:  Blood from Chest, Left Updated:  06/16/19 2315    Blood Culture - Blood, Arm, Left [[id number]] Collected:  06/16/19 2259    Specimen:  Blood from Arm, Left Updated:  06/16/19 2308    BNP [[id number]]  (Normal) Collected:  06/16/19 2007    Specimen:  Blood Updated:  06/16/19 2124     BNP 25.0 pg/mL      Comment: Results may be falsely decreased if patient taking Biotin.       [facility] Draw [[id number]] Collected:  06/16/19 2007    Specimen:  Blood Updated:  06/16/19 2115    Narrative:       The following orders were created for panel order [facility] Draw.  Procedure                               Abnormality          Status                     ---------                               -----------         ------                     Light Blue Top[[id number]]                                   Final result               Green Top (Gel)[[id number]]                                  Final result               Lavender Top[[id number]]                                     Final result               Gold Top - SST[[id number]]                                   Final result                 Please view results for these tests on the individual orders.    Light Blue Top [[id number]] Collected:  06/16/19 2007    Specimen:  Blood Updated:  06/16/19 2115     Extra Tube hold for add-on     Comment: Auto resulted       Green Top (Gel) [[id number]] Collected:  06/16/19 2007    Specimen:  Blood Updated:  06/16/19 2115     Extra Tube Hold for add-ons.     Comment: Auto resulted.       Lavender Top [[id number]] Collected:  06/16/19 2007    Specimen:  Blood Updated:  06/16/19 2115     Extra Tube hold for add-on     Comment: Auto resulted       Gold Top - SST [[id number]] Collected:  06/16/19 2007    Specimen:  Blood Updated:  06/16/19 2115     Extra Tube Hold for add-ons.     Comment: Auto resulted.       Troponin [[id number]]  (Normal) Collected:  06/16/19 2007    Specimen:  Blood Updated:  06/16/19 2053     Troponin I <0.030 ng/mL     Narrative:       Troponin I Reference Range:    0.00-0.03  Negative.  Repeat testing in 4-6 hours if clinically indicated.    0.04-0.29  Suspicious for myocardial injury. Serial measurements and clinical  correlation may be necessary to confirm or exclude diagnosis of acute  coronary syndrome.  Repeat testing in 4-6 hours if indicated.     >0.29 Consistent with myocardial injury.  Recommend clinical and laboratory correlation.     Results my be falsely decreased if patient taking Biotin.     CBC & Differential [[id number]] Collected:  06/16/19 2007    Specimen:  Blood Updated:  06/16/19 2048    Narrative:       The following orders  were created for panel order CBC & Differential.  Procedure                               Abnormality         Status                     ---------                               -----------         ------                     CBC Auto Differential[[id number]]        Abnormal            Final result                 Please view results for these tests on the individual orders.    CBC Auto Differential [[id number]]  (Abnormal) Collected:  06/16/19 2007    Specimen:  Blood Updated:  06/16/19 2048     WBC 3.10 10*3/mm3      RBC 3.06 10*6/mm3      Hemoglobin 8.8 g/dL      Hematocrit 26.5 %      MCV 86.5 fL      MCH 28.9 pg      MCHC 33.4 g/dL      RDW 22.5 %      RDW-SD 67.4 fl      MPV 7.7 fL      Platelets 254 10*3/mm3      Neutrophil % 64.9 %      Lymphocyte % 19.8 %      Monocyte % 14.6 %      Eosinophil % 0.4 %      Basophil % 0.3 %      Neutrophils, Absolute 2.00 10*3/mm3      Lymphocytes, Absolute 0.60 10*3/mm3      Monocytes, Absolute 0.50 10*3/mm3      Eosinophils, Absolute 0.00 10*3/mm3      Basophils, Absolute 0.00 10*3/mm3      nRBC 0.3 /100 WBC     Comprehensive Metabolic Panel [[id number]]  (Abnormal) Collected:  06/16/19 2007    Specimen:  Blood Updated:  06/16/19 2048     Glucose 109 mg/dL      BUN 19 mg/dL      Creatinine 0.60 mg/dL      Sodium 136 mmol/L      Potassium 3.9 mmol/L      Chloride 95 mmol/L      CO2 27.0 mmol/L      Calcium 9.1 mg/dL      Total Protein 6.4 g/dL      Albumin 3.20 g/dL      ALT (SGPT) 14 U/L      AST (SGOT) 19 U/L      Alkaline Phosphatase 83 U/L      Total Bilirubin 0.4 mg/dL      eGFR Non African Amer 100 mL/min/1.73      Globulin 3.2 gm/dL      A/G Ratio 1.0 g/dL      BUN/Creatinine Ratio 31.7     Anion Gap 14.0 mmol/L     Urinalysis With Culture If Indicated - Urine, Clean Catch [[id number]]  (Normal) Collected:  06/16/19 1938    Specimen:  Urine, Clean Catch Updated:  06/16/19 1946     Color, UA Yellow     Appearance, UA Clear     pH, UA 7.5     Specific [place], UA 1.010      Glucose, UA Negative     Ketones, UA Negative     Bilirubin, UA Negative     Blood, UA Negative     Protein, UA Negative     Leuk Esterase, UA Negative     Nitrite, UA Negative     Urobilinogen, UA 0.2 E.U./dL    Narrative:       Urine microscopic not indicated.          Imaging Results (last 24 hours)     Procedure Component Value Units Date/Time    XR Chest 2 View [[id number]] Collected:  06/1952     Updated:  06/1952    Narrative:       DATE OF EXAM:  6/16/2019 7:38 PM     PROCEDURE:  XR CHEST 2 VW-     INDICATIONS:  CHF/COPD Protocol, chest pain     COMPARISON:  Portable chest x-ray 06/07/2019     TECHNIQUE:   Two radiologic views of the chest , PA and lateral were obtained.     FINDINGS:  Tip of the tracheostomy tube terminates above the adi. Tip of the  left subclavian central venous port catheter is stable. Cardiac  silhouette does not appear enlarged. There are mild hazy bibasilar  airspace opacities. No pneumothorax or pleural effusion is seen. There  are degenerative changes in the mid to lower thoracic spine.        Impression:       Mild hazy bibasilar airspace opacities, likely atelectasis or perhaps  pneumonia.     Electronically Signed By-Kerrie Paredes On:6/16/2019 7:52 PM  This report was finalized on [id number] by  Kerrie Paredes, .                    @[email or web address]@                     [facility], labs reviewed personally by physician.    ECG/EMG Results (most recent)     Procedure Component Value Units Date/Time    ECG 12 Lead [[id number]] Collected:  06/16/19 2009     Updated:  06/17/19 0601    Narrative:       HEART RATE= 88  bpm  RR Interval= 684  ms  OR Interval= 109  ms  P Horizontal Axis= 5  deg  P Front Axis= 68  deg  QRSD Interval= 113  ms  QT Interval= 389  ms  QRS Axis= -38  deg  T Wave Axis= 36  deg  - ABNORMAL ECG -  Sinus rhythm  Incomplete RBBB and LAFB  Electronically Signed By: Vincent Webb ([facility]) 17-Jun-2019 06:00:50  Date and Time of Study: 2019-06-16 20:09:33     "      Medication Review:   I have reviewed the patient's current medication list    Current Facility-Administered Medications:   •  amLODIPine (NORVASC) tablet 5 mg, 5 mg, Per G Tube, QAM, Wes-Short, Julia, APRN, 5 mg at 06/17/19 0933  •  anastrozole (ARIMIDEX) tablet 1 mg, 1 mg, Per G Tube, Daily, Wes-Short, Julia, APRN, 1 mg at 06/17/19 0941  •  cefepime 2 gm IVPB in 100 ml NS (MBP), 2 g, Intravenous, Q12H, Ab Almanzar, DO  •  guaifenesin-codeine (GUAIFENESIN AC) 100-10 MG/5ML liquid 10 mL, 10 mL, Per G Tube, Q4H, Wes-Short, Julia, APRN, 10 mL at 06/17/19 1338  •  levothyroxine (SYNTHROID, LEVOTHROID) tablet 175 mcg, 175 mcg, Per G Tube, Q AM, Wes-Short, Julia, APRN, 175 mcg at 06/17/19 0933  •  lisinopril (PRINIVIL,ZESTRIL) tablet 40 mg, 40 mg, Per G Tube, QAM, Wes-Short, Julia, APRN, 40 mg at 06/17/19 0933  •  LORazepam (ATIVAN) tablet 0.5 mg, 0.5 mg, Per G Tube, Q6H PRN, Wes-Short, Julia, APRN  •  oxyCODONE (ROXICODONE) immediate release tablet 10 mg, 10 mg, Per G Tube, Nightly PRN, Wes-Short, Julia, APRN  •  pantoprazole (PROTONIX) EC tablet 40 mg, 40 mg, Oral, QAM, Wes-Short, Julia, APRN, 40 mg at 06/17/19 0933  •  saccharomyces boulardii (FLORASTOR) capsule 250 mg, 250 mg, Per G Tube, Daily, Wes-Short, Julia, APRN, 250 mg at 06/17/19 0941  •  sodium chloride 0.9 % flush 10 mL, 10 mL, Intravenous, PRN, Jammie León PA-C, 10 mL at 06/16/19 2012  •  sodium chloride 0.9 % flush 3 mL, 3 mL, Intravenous, Q12H, Wes-Short, Julia, APRN, 3 mL at 06/17/19 0929  •  sodium chloride 0.9 % flush 3-10 mL, 3-10 mL, Intravenous, PRN, Wes-Short, Julia, APRN  •  [START ON 6/18/2019] vancomycin (VANCOCIN) 1,000 mg in sodium chloride 0.9 % 250 mL IVPB, 1,000 mg, Intravenous, Q12H, Ab Almanzar M, DO      Assessment/Plan      Acute respiratory insufficiency  -poss d/t continued PNA vs uncontrolled secreations   -CXR showed \"Mild hazy bibasilar airspace opacities, likely atelectasis or perhaps pneumonia.\"  "   -favor more atelectasis , stop Vanc  -c/w cefepime   - pneumococcal and legionella urine negative  -BCx NG 24 hours   -Trach care  -Mucinex q4 per tube, add mucomyst nebs     Oral cancer with radical neck dissection and glossectomy with reconstruction s/p PEG and trach  -PEG changed out last Thursday  -oncology following  -c/w TF   -Continue Ativan     H/o Breast cancer  -Continue Arimidex      Neutropenia   -WBC 3.1, stable     Normocytic anemia  -Hgb  at patient's baseline      Chronic hypertension,controlled  -Continue Norvasc and Lisinopril     GERD  -Continue PPI      Hypothyroidism  -Continue Levothyroxine     DVT Prophylaxis  -Bilateral SCD's         Plan for disposition: Hopefully 24 to 48 hours if continues to improve    Ab Almanzar DO  06/17/19  4:35 PM

## 2019-06-17 NOTE — CONSULTS
HEMATOLOGY/ONCOLOGY CONSULTATION NOTE     Patient name: Elma Sheikh   YOB: 1950  Referring Provider:VAL Cole  Reason for Consultation: Head and neck carcinoma   Date of Service: 06/16/2019    Chief Complaint: Cough with bloody sputum     History of Present Illness:   Ms. Sheikh is a 68 year old  female with a past medical history of head and neck cancer s/p glossectomy with reconstruction and radical neck dissection, s/p tracheseostomy and PEG, chronic HTN, hypothyroidism, s/p breast cancer, GERD, and chronic pain with narcotic dependence.  She reported to [facility] ED on 6/16/19 with her daughter. Per her report, after she was discharged on 5/22/19, she was able to finish 8 days out of 10 of her Omnicef.  She had to stop the antibiotic due to diarrhea.  Her daughter stated she continued to take Mucinex and had good control of her sputum production.  She started to back off of the Mucinex and her sputum became very thick and was unable to get any sputum with trach suctioning.  She also reported some bloody sputum. She also had a feeding tube changed out last Thursday for a smaller tube.  She stated she was not able  to get her PPI granules through the tube and believed it has contributed to the blood in her sputum. She last had radiation 2 weeks ago. Labs in the ER were essentially unremarkable with continued neutropenia. A CXR in the ED showed mild hazy bibasilar airspace opacities, likely atelectasis or perhaps pneumonia. She was admitted for further workup and evaluation.     6/18/19 Hematology/Oncology was consulted on this patient and known to our service.  She was followed by Dr. Davis for head and neck carcinoma.  She is currently receiving treatment through [facility] [place] [facility].  She is known to our service and had been followed by Dr. Davis for LCIS and atypical lobular hyperplasia diagnosed 2014, BLANCO diagnosed 2016, invasive mammary carcinoma with invasive features  (ER+, ND- and [id number]-) of the right breast diagnosed October 2017, and squamous cell carcinoma of the tongue diagnosed 2018.  She was originally seen by us for her LCIS and had undergone resection followed by tamoxifen completed in 2017. Genetic screening with MyRisk was negative in 2014. BLANCO was noted in 2016. She was started on oral iron and referred to GI. EGD 9/26/19 showed grade 4 erosive esophagitis, distal esophageal stricture and grade 3 to 4 erosive duodenitis as well as erosive gastritis.  Patient was placed on Prilosec.  Colonoscopy revealed hemorrhoids, otherwise negative.  Pathology did not reveal any abnormalities. For her right breast invasive mammary carcinoma, she had undergone a lumpectomy followed by adjuvant chemotherapy with Adriamycin and Cytoxan with 4 cycles completed February 2018 and radiation to the right breast completed 5/2018.  She was started on adjuvant Arimidex July 2018. In April 2018 she was diagnosed with squamous cell carcinoma of the right lateral tongue.  She underwent partial glossectomy 05/31/18 with lymph node dissection revealing stage III disease and initially was treated with adjuvant radiation.  A CT scan on 08/28/18 showed a 12 mm sclerotic lesion in T2 however PET was negative.  MRI had shown nonspecific changes and T8 and T12.  Bone scan was negative.  She completed adjuvant tongue radiation 10/18/2018.  She was last seen as an outpatient 11/2018 where follow-up PET scan was ordered for February 2019. Adjuvant Arimidex was continued for her right breast cancer.  She was evaluated January 2019 by Dr. Younger, ENT where she reported continued right tongue pain. Biopsies were taken of the right tongue revealing invasive moderately differentiated keratinizing squamous cell carcinoma.  She transferred care to [facility] and was evaluated by Dr. Joseph Valentino. Restaging PET showed a large hypermetabolic mass of the right oral tongue and right femur, 2  hypermetabolic small sub cm right neck lymph nodes, there is no evidence of distant metastatic disease.  There was suspicious focality with associated calcification in the left thyroid lobe with recommendation for thyroid ultrasound. She was most recently admitted 5/19/19 to 5/22/19 for pancytopenia/febrile neutropenia.      PCP: Lorenza Parham  Oncology:  Stalin    Review of Systems   Constitutional: Negative for appetite change, chills, fatigue, fever and unexpected weight change.   HENT: Negative for congestion, hearing loss, mouth sores, nosebleeds, postnasal drip, rhinorrhea and sore throat.    Eyes: Negative for photophobia, pain and visual disturbance.   Respiratory: Positive for cough (productive of sputum ). Negative for chest tightness, shortness of breath and wheezing.         Hemoptysis    Cardiovascular: Negative for chest pain, palpitations and leg swelling.   Gastrointestinal: Negative for abdominal distention, abdominal pain, constipation, diarrhea, nausea and vomiting.   Genitourinary: Negative for difficulty urinating, dysuria and hematuria.   Musculoskeletal: Negative for arthralgias, back pain, gait problem and myalgias.   Skin: Negative for pallor and rash.   Neurological: Negative for dizziness, weakness, light-headedness, numbness and headaches.   Hematological: Negative for adenopathy. Does not bruise/bleed easily.   Psychiatric/Behavioral: Negative for agitation, confusion and dysphoric mood. The patient is not nervous/anxious.       History  Past Medical History:   Diagnosis Date   • Anesthesia complication     developed myalgia after anesthesia 6/2017 then after that anesthesia aware and hadn't had any problems  tete's 4/18/18 note states what anesthesia she got and awaiting [facility] anesthesia report. notified anesthea dept at [facility] and they wanted all notes    • Aortic stenosis 6/17/2019   • Arthritis    • Breast cancer (CMS/HCC)     right breast had chemo & radiation    •  Cancer (CMS/HCC)     right breast cancer   • Disease of thyroid gland    • GERD (gastroesophageal reflux disease)     rarely   • Heart murmur    • Hypertension    • Hypertension 6/17/2019   • Hypothyroidism    • Port-A-Cath in place     left side chest   ,   Past Surgical History:   Procedure Laterality Date   • BREAST LUMPECTOMY WITH AXILLARY NODE DISSECTION Right    • BREAST SURGERY      biopsy   • CARDIAC CATHETERIZATION     • CATARACT EXTRACTION Bilateral    • COLONOSCOPY     • ENDOSCOPY     • HEAD/NECK LESION/CYST EXCISION Right 6/14/2018    Procedure: RT. NECK INCISION AND DRAINAGE;  Surgeon: Toan Reed MD;  Location: [facility] OR;  Service: ENT   • HYSTERECTOMY       Includes complete hysterectomy secondary to fibroid tumor.    • NECK DISSECTION Right 5/31/2018    Procedure: RIGHT MODIFIED NECK DISSECTION;  Surgeon: Toan Reed MD;  Location: [facility] OR [facility];  Service: ENT   • TONGUE LESION EXCISION/BIOPSY Right     x2   • VENOUS ACCESS DEVICE (PORT) INSERTION      left   ,   Family History   Problem Relation Age of Onset   • Malig Hyperthermia Neg Hx    ,   Social History     Tobacco Use   • Smoking status: Never Smoker   • Smokeless tobacco: Never Used   Substance Use Topics   • Alcohol use: No   • Drug use: No   ,   Medications Prior to Admission   Medication Sig Dispense Refill Last Dose   • Probiotic Product (PROBIOTIC DAILY PO) Take 1 capsule by mouth Daily.      • amLODIPine (NORVASC) 5 MG tablet Take 5 mg by mouth Every Morning.   6/14/2018 at Unknown time   • anastrozole (ARIMIDEX) 1 MG tablet Take 1 mg by mouth Daily.  6    • lisinopril (PRINIVIL,ZESTRIL) 40 MG tablet Take 40 mg by mouth Every Morning.   6/14/2018 at Unknown time   • LORazepam (ATIVAN) 0.5 MG tablet Take 0.5 mg by mouth Every 6 (Six) Hours As Needed. for anxiety  2    • omeprazole (priLOSEC) 40 MG capsule Take 40 mg by mouth Daily.      • oxyCODONE (ROXICODONE) 10 MG tablet TAKE 1 TO 2 TABLETS BY MOUTH EVERY 6 HOURS   "0    • SYNTHROID 175 MCG tablet Take 175 mcg by mouth Daily.      , Scheduled Meds:      amLODIPine 5 mg Per G Tube QAM   anastrozole 1 mg Per G Tube Daily   cefepime 2 g Intravenous Q8H   guaifenesin-codeine 10 mL Per G Tube Q4H   levothyroxine 175 mcg Per G Tube Q AM   lisinopril 40 mg Per G Tube QAM   pantoprazole 40 mg Oral QAM   saccharomyces boulardii 250 mg Per G Tube Daily   sodium chloride 3 mL Intravenous Q12H   vancomycin 1,000 mg Intravenous Q12H   , Continuous Infusions:      Pharmacy to dose vancomycin    , PRN Meds:  LORazepam  •  oxyCODONE  •  [COMPLETED] cefepime **AND** [COMPLETED] vancomycin **AND** Pharmacy to dose vancomycin  •  sodium chloride  •  sodium chloride and Allergies:  Sulfa antibiotics and Succinylcholine    Objective     Vital Signs   /74   Pulse 86   Temp 98.4 °F (36.9 °C) (Axillary)   Resp 20   Ht 165.1 cm (65\")   Wt 56.4 kg (124 lb 5.4 oz)   SpO2 97%   BMI 20.69 kg/m²     Physical Exam:   Physical Exam   Constitutional: She is oriented to person, place, and time. She appears well-developed and well-nourished. She appears ill. No distress.   HENT:   Head: Normocephalic and atraumatic.   Nose: Nose normal.   Mouth/Throat: Oropharynx is clear and moist. No oropharyngeal exudate.   Alopecia   Eyes: Conjunctivae and EOM are normal. Pupils are equal, round, and reactive to light. No scleral icterus.   Neck: Normal range of motion. No JVD present. No tracheal deviation present. No thyromegaly present.   Cardiovascular: Normal rate, regular rhythm, normal heart sounds and intact distal pulses. Exam reveals no gallop and no friction rub.   No murmur heard.  Pulmonary/Chest: Effort normal. No stridor. No respiratory distress. She has no wheezes. She has rhonchi ( bilateral ). She has no rales. She exhibits no tenderness.   Abdominal: Soft. Bowel sounds are normal. She exhibits no distension and no mass. There is no tenderness. There is no rebound and no guarding. No hernia. "   PEG tube   Musculoskeletal: Normal range of motion. She exhibits no edema, tenderness or deformity.   Lymphadenopathy:     She has no cervical adenopathy.   Neurological: She is alert and oriented to person, place, and time. No sensory deficit.   Skin: Skin is warm and dry. No rash noted. She is not diaphoretic. No erythema. No pallor.   Healing scars s/p radiation to neck    Psychiatric: She has a normal mood and affect. Her behavior is normal.   Flat affect    Vitals reviewed.    Results Review:  Lab Results (last 48 hours)     Procedure Component Value Units Date/Time    Creatinine, Serum [[id number]]  (Normal) Collected:  06/17/19 0436    Specimen:  Blood Updated:  06/17/19 0526     Creatinine 0.50 mg/dL      eGFR Non African Amer 123 mL/min/1.73     Lactic Acid, Reflex [[id number]]  (Normal) Collected:  06/17/19 0436    Specimen:  Blood Updated:  06/17/19 0525     Lactate 1.4 mmol/L     Legionella Antigen, Urine - Urine, Urine, Clean Catch [[id number]]  (Normal) Collected:  06/16/19 1938    Specimen:  Urine, Clean Catch Updated:  06/17/19 0355     LEGIONELLA ANTIGEN, URINE Negative    S. Pneumo Ag Urine or CSF - Urine, Urine, Clean Catch [[id number]]  (Normal) Collected:  06/16/19 1938    Specimen:  Urine, Clean Catch Updated:  06/17/19 0354     Strep Pneumo Ag Negative    Lactic Acid, Reflex Timer (This will reflex a repeat order 3-3:15 hours after ordered.) [[id number]] Collected:  06/16/19 2314    Specimen:  Blood Updated:  06/17/19 0230     Extra Tube Hold for add-ons.     Comment: Auto resulted.       POC Lactate [[id number]]  (Normal) Collected:  06/16/19 2314    Specimen:  Blood Updated:  06/16/19 2319     Lactate 2.20 mmol/L      Comment: Serial Number: [id number]:  [id number]       Blood Culture - Blood, Chest, Left [[id number]] Collected:  06/16/19 2253    Specimen:  Blood from Chest, Left Updated:  06/16/19 2315    Blood Culture - Blood, Arm, Left [[id number]] Collected:  06/16/19 2259    Specimen:   Blood from Arm, Left Updated:  06/16/19 2308    BNP [[id number]]  (Normal) Collected:  06/16/19 2007    Specimen:  Blood Updated:  06/16/19 2124     BNP 25.0 pg/mL      Comment: Results may be falsely decreased if patient taking Biotin.       [place] Draw [[id number]] Collected:  06/16/19 2007    Specimen:  Blood Updated:  06/16/19 2115    Narrative:       The following orders were created for panel order [place] Draw.  Procedure                               Abnormality         Status                     ---------                               -----------         ------                     Light Blue Top[[id number]]                                   Final result               Green Top (Gel)[[id number]]                                  Final result               Lavender Top[[id number]]                                     Final result               Gold Top - SST[[id number]]                                   Final result                 Please view results for these tests on the individual orders.    Light Blue Top [[id number]] Collected:  06/16/19 2007    Specimen:  Blood Updated:  06/16/19 2115     Extra Tube hold for add-on     Comment: Auto resulted       Green Top (Gel) [[id number]] Collected:  06/16/19 2007    Specimen:  Blood Updated:  06/16/19 2115     Extra Tube Hold for add-ons.     Comment: Auto resulted.       Lavender Top [[id number]] Collected:  06/16/19 2007    Specimen:  Blood Updated:  06/16/19 2115     Extra Tube hold for add-on     Comment: Auto resulted       Gold Top - SST [[id number]] Collected:  06/16/19 2007    Specimen:  Blood Updated:  06/16/19 2115     Extra Tube Hold for add-ons.     Comment: Auto resulted.       Troponin [[id number]]  (Normal) Collected:  06/16/19 2007    Specimen:  Blood Updated:  06/16/19 2053     Troponin I <0.030 ng/mL     Narrative:       Troponin I Reference Range:    0.00-0.03  Negative.  Repeat testing in 4-6 hours if clinically indicated.    0.04-0.29  Suspicious for  myocardial injury. Serial measurements and clinical  correlation may be necessary to confirm or exclude diagnosis of acute  coronary syndrome.  Repeat testing in 4-6 hours if indicated.     >0.29 Consistent with myocardial injury.  Recommend clinical and laboratory correlation.     Results my be falsely decreased if patient taking Biotin.     CBC & Differential [[id number]] Collected:  06/16/19 2007    Specimen:  Blood Updated:  06/16/19 2048    Narrative:       The following orders were created for panel order CBC & Differential.  Procedure                               Abnormality         Status                     ---------                               -----------         ------                     CBC Auto Differential[[id number]]        Abnormal            Final result                 Please view results for these tests on the individual orders.    CBC Auto Differential [[id number]]  (Abnormal) Collected:  06/16/19 2007    Specimen:  Blood Updated:  06/16/19 2048     WBC 3.10 10*3/mm3      RBC 3.06 10*6/mm3      Hemoglobin 8.8 g/dL      Hematocrit 26.5 %      MCV 86.5 fL      MCH 28.9 pg      MCHC 33.4 g/dL      RDW 22.5 %      RDW-SD 67.4 fl      MPV 7.7 fL      Platelets 254 10*3/mm3      Neutrophil % 64.9 %      Lymphocyte % 19.8 %      Monocyte % 14.6 %      Eosinophil % 0.4 %      Basophil % 0.3 %      Neutrophils, Absolute 2.00 10*3/mm3      Lymphocytes, Absolute 0.60 10*3/mm3      Monocytes, Absolute 0.50 10*3/mm3      Eosinophils, Absolute 0.00 10*3/mm3      Basophils, Absolute 0.00 10*3/mm3      nRBC 0.3 /100 WBC     Comprehensive Metabolic Panel [[id number]]  (Abnormal) Collected:  06/16/19 2007    Specimen:  Blood Updated:  06/16/19 2048     Glucose 109 mg/dL      BUN 19 mg/dL      Creatinine 0.60 mg/dL      Sodium 136 mmol/L      Potassium 3.9 mmol/L      Chloride 95 mmol/L      CO2 27.0 mmol/L      Calcium 9.1 mg/dL      Total Protein 6.4 g/dL      Albumin 3.20 g/dL      ALT (SGPT) 14 U/L      AST  (SGOT) 19 U/L      Alkaline Phosphatase 83 U/L      Total Bilirubin 0.4 mg/dL      eGFR Non African Amer 100 mL/min/1.73      Globulin 3.2 gm/dL      A/G Ratio 1.0 g/dL      BUN/Creatinine Ratio 31.7     Anion Gap 14.0 mmol/L     Urinalysis With Culture If Indicated - Urine, Clean Catch [[id number]]  (Normal) Collected:  06/16/19 1938    Specimen:  Urine, Clean Catch Updated:  06/16/19 1946     Color, UA Yellow     Appearance, UA Clear     pH, UA 7.5     Specific Gravity, UA 1.010     Glucose, UA Negative     Ketones, UA Negative     Bilirubin, UA Negative     Blood, UA Negative     Protein, UA Negative     Leuk Esterase, UA Negative     Nitrite, UA Negative     Urobilinogen, UA 0.2 E.U./dL    Narrative:       Urine microscopic not indicated.           Lab Results (last 48 hours)     Procedure Component Value Units Date/Time    Creatinine, Serum [[id number]]  (Normal) Collected:  06/17/19 0436    Specimen:  Blood Updated:  06/17/19 0526     Creatinine 0.50 mg/dL      eGFR Non African Amer 123 mL/min/1.73     Lactic Acid, Reflex [[id number]]  (Normal) Collected:  06/17/19 0436    Specimen:  Blood Updated:  06/17/19 0525     Lactate 1.4 mmol/L     Legionella Antigen, Urine - Urine, Urine, Clean Catch [[id number]]  (Normal) Collected:  06/16/19 1938    Specimen:  Urine, Clean Catch Updated:  06/17/19 0355     LEGIONELLA ANTIGEN, URINE Negative    S. Pneumo Ag Urine or CSF - Urine, Urine, Clean Catch [[id number]]  (Normal) Collected:  06/16/19 1938    Specimen:  Urine, Clean Catch Updated:  06/17/19 0354     Strep Pneumo Ag Negative    Lactic Acid, Reflex Timer (This will reflex a repeat order 3-3:15 hours after ordered.) [[id number]] Collected:  06/16/19 2314    Specimen:  Blood Updated:  06/17/19 0230     Extra Tube Hold for add-ons.     Comment: Auto resulted.       POC Lactate [[id number]]  (Normal) Collected:  06/16/19 2314    Specimen:  Blood Updated:  06/16/19 2319     Lactate 2.20 mmol/L      Comment: Serial Number:  [id number]:  [id number]       Blood Culture - Blood, Chest, Left [[id number]] Collected:  06/16/19 2253    Specimen:  Blood from Chest, Left Updated:  06/16/19 2315    Blood Culture - Blood, Arm, Left [[id number]] Collected:  06/16/19 2259    Specimen:  Blood from Arm, Left Updated:  06/16/19 2308    BNP [[id number]]  (Normal) Collected:  06/16/19 2007    Specimen:  Blood Updated:  06/16/19 2124     BNP 25.0 pg/mL      Comment: Results may be falsely decreased if patient taking Biotin.       [place] Draw [[id number]] Collected:  06/16/19 2007    Specimen:  Blood Updated:  06/16/19 2115    Narrative:       The following orders were created for panel order [place] Draw.  Procedure                               Abnormality         Status                     ---------                               -----------         ------                     Light Blue Top[[id number]]                                   Final result               Green Top (Gel)[[id number]]                                  Final result               Lavender Top[[id number]]                                     Final result               Gold Top - SST[[id number]]                                   Final result                 Please view results for these tests on the individual orders.    Light Blue Top [[id number]] Collected:  06/16/19 2007    Specimen:  Blood Updated:  06/16/19 2115     Extra Tube hold for add-on     Comment: Auto resulted       Green Top (Gel) [[id number]] Collected:  06/16/19 2007    Specimen:  Blood Updated:  06/16/19 2115     Extra Tube Hold for add-ons.     Comment: Auto resulted.       Lavender Top [[id number]] Collected:  06/16/19 2007    Specimen:  Blood Updated:  06/16/19 2115     Extra Tube hold for add-on     Comment: Auto resulted       Gold Top - SST [[id number]] Collected:  06/16/19 2007    Specimen:  Blood Updated:  06/16/19 2115     Extra Tube Hold for add-ons.     Comment: Auto resulted.       Troponin [[id number]]  (Normal)  Collected:  06/16/19 2007    Specimen:  Blood Updated:  06/16/19 2053     Troponin I <0.030 ng/mL     Narrative:       Troponin I Reference Range:    0.00-0.03  Negative.  Repeat testing in 4-6 hours if clinically indicated.    0.04-0.29  Suspicious for myocardial injury. Serial measurements and clinical  correlation may be necessary to confirm or exclude diagnosis of acute  coronary syndrome.  Repeat testing in 4-6 hours if indicated.     >0.29 Consistent with myocardial injury.  Recommend clinical and laboratory correlation.     Results my be falsely decreased if patient taking Biotin.     CBC & Differential [[id number]] Collected:  06/16/19 2007    Specimen:  Blood Updated:  06/16/19 2048    Narrative:       The following orders were created for panel order CBC & Differential.  Procedure                               Abnormality         Status                     ---------                               -----------         ------                     CBC Auto Differential[[id number]]        Abnormal            Final result                 Please view results for these tests on the individual orders.    CBC Auto Differential [[id number]]  (Abnormal) Collected:  06/16/19 2007    Specimen:  Blood Updated:  06/16/19 2048     WBC 3.10 10*3/mm3      RBC 3.06 10*6/mm3      Hemoglobin 8.8 g/dL      Hematocrit 26.5 %      MCV 86.5 fL      MCH 28.9 pg      MCHC 33.4 g/dL      RDW 22.5 %      RDW-SD 67.4 fl      MPV 7.7 fL      Platelets 254 10*3/mm3      Neutrophil % 64.9 %      Lymphocyte % 19.8 %      Monocyte % 14.6 %      Eosinophil % 0.4 %      Basophil % 0.3 %      Neutrophils, Absolute 2.00 10*3/mm3      Lymphocytes, Absolute 0.60 10*3/mm3      Monocytes, Absolute 0.50 10*3/mm3      Eosinophils, Absolute 0.00 10*3/mm3      Basophils, Absolute 0.00 10*3/mm3      nRBC 0.3 /100 WBC     Comprehensive Metabolic Panel [[id number]]  (Abnormal) Collected:  06/16/19 2007    Specimen:  Blood Updated:  06/16/19 2048     Glucose 109  mg/dL      BUN 19 mg/dL      Creatinine 0.60 mg/dL      Sodium 136 mmol/L      Potassium 3.9 mmol/L      Chloride 95 mmol/L      CO2 27.0 mmol/L      Calcium 9.1 mg/dL      Total Protein 6.4 g/dL      Albumin 3.20 g/dL      ALT (SGPT) 14 U/L      AST (SGOT) 19 U/L      Alkaline Phosphatase 83 U/L      Total Bilirubin 0.4 mg/dL      eGFR Non African Amer 100 mL/min/1.73      Globulin 3.2 gm/dL      A/G Ratio 1.0 g/dL      BUN/Creatinine Ratio 31.7     Anion Gap 14.0 mmol/L     Urinalysis With Culture If Indicated - Urine, Clean Catch [[id number]]  (Normal) Collected:  06/16/19 1938    Specimen:  Urine, Clean Catch Updated:  06/16/19 1946     Color, UA Yellow     Appearance, UA Clear     pH, UA 7.5     Specific Gravity, UA 1.010     Glucose, UA Negative     Ketones, UA Negative     Bilirubin, UA Negative     Blood, UA Negative     Protein, UA Negative     Leuk Esterase, UA Negative     Nitrite, UA Negative     Urobilinogen, UA 0.2 E.U./dL    Narrative:       Urine microscopic not indicated.        Pending labs: Respiratory viral panels, blood cultures, pneumococcal and legionella urine    Imaging Reviewed:  Xr Chest 2 View  Result Date: 6/16/2019  DATE OF EXAM: 6/16/2019 7:38 PM  PROCEDURE: XR CHEST 2 VW-  INDICATIONS: CHF/COPD Protocol, chest pain  COMPARISON: Portable chest x-ray 06/07/2019  TECHNIQUE: Two radiologic views of the chest , PA and lateral were obtained.  FINDINGS: Tip of the tracheostomy tube terminates above the adi. Tip of the left subclavian central venous port catheter is stable. Cardiac silhouette does not appear enlarged. There are mild hazy bibasilar airspace opacities. No pneumothorax or pleural effusion is seen. There are degenerative changes in the mid to lower thoracic spine.      Mild hazy bibasilar airspace opacities, likely atelectasis or perhaps pneumonia.  Electronically Signed By-Kerrie Paredes On:6/16/2019 7:52 PM This report was finalized on [id number] by  Kerrie  Lena.    Assessment/Plan      Assessment:   1. Pancytopenia/febrile neutropenia: likely due to chemo/XRT effect on active chemo and increased risk of cytopenias with h/o prior chemo in past. H/o BLANCO in 2016 treated with oral iron and scopes done 9/2016. EGD showed  erosive esophagitis, distal esophageal stricture and grade 3 to 4 erosive duodenitis as well as erosive gastritis.  Colonoscopy revealed hemorrhoids, otherwise negative. PEG changed 06/13/19. Continue Ativan.  WBC 3.1, Hgb 8.8. Plt 254.     Progressive stage III squamous cell ca of right tongue: h/o resection radical neck dissection and glossectomy with reconstruction s/p PEG and trach and adjuvant XRT with local progression 2/2019.  Patient has completed chemotherapy and radiation at [facility] 2 weeks ago.    2. Right breast cancer: s/p resection and adjuvant chemo. On daily Arimidex. Pt reported mammo neg 12/2018.    3. Pneumonia:  CXR showed mild hazy bibasilar airspace opacities, likely atelectasis or perhaps pneumonia. Started on Cefepime and vancomycin. Check pneumococcal and legionella urine. Respiratory viral panel pending. Follow up blood cultures. Trachrostomy care. Mucinex q4 per tube    4. GERD/Hypothyroidism/Hypertension/Aortic stenosis: per primary team    5. GERD/DVT Prophy: pantoprazole/bilateral SCDs    Plan:  1. CBC in AM and then daily   2. Transfuse pRBC PRN to keep Hgb above 7.5  3. Transfuse plts PRN less than 20K  4. Hold Lovenox if plts drop below 50K  5. Continue Arimidex      This note was electronically signed by VAL Arvizu on 6/16/2019 8:23 AM.               I reviewed the patient's new clinical results.  I reviewed the patient's new imaging results and agree with the interpretation.  I reviewed the patient's other test results and agree with the interpretation      VAL Morse  06/17/19  10:29 AM    Patient seen and examined. History reviewed. Notes reviewed and edited. As above.    Elma  ERIC Sheikh  6/17/2019  3:56 PM

## 2019-06-17 NOTE — ED NOTES
Rt at [facility] for trach suctioning. Pt vomitted after suction. Discussed plan of care with pt and family. Pt denies change in status. Vss. Will continue to monitor.      Mariusz Sanchez RN  06/16/19 2045

## 2019-06-17 NOTE — PROGRESS NOTES
Discharge Planning Assessment  [facility]     Patient Name: Elma Sheikh  MRN: [id number]  Today's Date: 6/17/2019    Admit Date: 6/16/2019    Discharge Needs Assessment     Row Name 06/17/19 1025       Living Environment    Lives With  child(adrianna), adult    Name(s) of Who Lives With Patient  Son in law Gagan Rodriguez at bedside. Daughter is Adrienne Rodriguez    Current Living Arrangements  home/apartment/condo    Primary Care Provided by  child(adrianna);homecare agency    Living Arrangement Comments  OK  to return       Resource/Environmental Concerns    Transportation Concerns  car, none       Transition Planning    Patient/Family Anticipates Transition to  home    Patient/Family Anticipated Services at Transition  home health care       Discharge Needs Assessment    Readmission Within the Last 30 Days  other (see comments)    Concerns Comments  Difficulty at home administering meds through smaller feeding tube per daughter. Consult with current home health agency/ily.    Equipment Currently Used at Home  trach supplies;feeding device    Outpatient/Agency/Support Group Needs  homecare agency    Patient's Choice of Community Agency(s)  Current with [facility].    Current Discharge Risk  chronically ill        Discharge Plan    DC Plan: Current with [facility] - further changes pending       Destination      No service coordination in this encounter.      Durable Medical Equipment      No service coordination in this encounter.      Dialysis/Infusion      No service coordination in this encounter.      Home Medical Care      No service coordination in this encounter.      Therapy      No service coordination in this encounter.      Community Resources      No service coordination in this encounter.          Demographic Summary    No documentation.       Functional Status    No documentation.       Psychosocial    No documentation.       Abuse/Neglect    No documentation.       Legal    No documentation.        Substance Abuse    No documentation.       Patient Forms    No documentation.           Alka Anderson, RN

## 2019-06-18 VITALS
RESPIRATION RATE: 18 BRPM | OXYGEN SATURATION: 100 % | DIASTOLIC BLOOD PRESSURE: 62 MMHG | SYSTOLIC BLOOD PRESSURE: 120 MMHG | HEIGHT: 65 IN | WEIGHT: 124.34 LBS | HEART RATE: 93 BPM | TEMPERATURE: 98.4 F | BODY MASS INDEX: 20.72 KG/M2

## 2019-06-18 LAB
ALBUMIN SERPL-MCNC: 2.9 G/DL (ref 3.5–4.8)
ALBUMIN/GLOB SERPL: 1 G/DL (ref 1–1.7)
ALP SERPL-CCNC: 71 U/L (ref 32–91)
ALT SERPL W P-5'-P-CCNC: 14 U/L (ref 14–54)
ANION GAP SERPL CALCULATED.3IONS-SCNC: 13 MMOL/L (ref 10–20)
AST SERPL-CCNC: 17 U/L (ref 15–41)
BASOPHILS # BLD AUTO: 0 10*3/MM3 (ref 0–0.2)
BASOPHILS NFR BLD AUTO: 0.4 % (ref 0–1.5)
BILIRUB SERPL-MCNC: 0.6 MG/DL (ref 0.3–1.2)
BUN BLD-MCNC: 16 MG/DL (ref 8–20)
BUN/CREAT SERPL: 32 (ref 5.4–26.2)
CALCIUM SPEC-SCNC: 9.1 MG/DL (ref 8.9–10.3)
CHLORIDE SERPL-SCNC: 102 MMOL/L (ref 101–111)
CO2 SERPL-SCNC: 26 MMOL/L (ref 22–32)
CREAT BLD-MCNC: 0.5 MG/DL (ref 0.4–1)
DEPRECATED RDW RBC AUTO: 69.6 FL (ref 37–54)
EOSINOPHIL # BLD AUTO: 0 10*3/MM3 (ref 0–0.4)
EOSINOPHIL NFR BLD AUTO: 0.8 % (ref 0.3–6.2)
ERYTHROCYTE [DISTWIDTH] IN BLOOD BY AUTOMATED COUNT: 23.1 % (ref 12.3–15.4)
GFR SERPL CREATININE-BSD FRML MDRD: 123 ML/MIN/1.73
GLOBULIN UR ELPH-MCNC: 3 GM/DL (ref 2.5–3.8)
GLUCOSE BLD-MCNC: 113 MG/DL (ref 65–99)
HCT VFR BLD AUTO: 25 % (ref 34–46.6)
HGB BLD-MCNC: 8.3 G/DL (ref 12–15.9)
LYMPHOCYTES # BLD AUTO: 0.5 10*3/MM3 (ref 0.7–3.1)
LYMPHOCYTES NFR BLD AUTO: 21.9 % (ref 19.6–45.3)
MCH RBC QN AUTO: 29.1 PG (ref 26.6–33)
MCHC RBC AUTO-ENTMCNC: 33.4 G/DL (ref 31.5–35.7)
MCV RBC AUTO: 87.1 FL (ref 79–97)
MONOCYTES # BLD AUTO: 0.4 10*3/MM3 (ref 0.1–0.9)
MONOCYTES NFR BLD AUTO: 17.4 % (ref 5–12)
NEUTROPHILS # BLD AUTO: 1.3 10*3/MM3 (ref 1.7–7)
NEUTROPHILS NFR BLD AUTO: 59.5 % (ref 42.7–76)
NRBC BLD AUTO-RTO: 0.3 /100 WBC (ref 0–0.2)
PLATELET # BLD AUTO: 216 10*3/MM3 (ref 140–450)
PMV BLD AUTO: 7.7 FL (ref 6–12)
POTASSIUM BLD-SCNC: 3.3 MMOL/L (ref 3.6–5.1)
PROT SERPL-MCNC: 5.9 G/DL (ref 6.1–7.9)
RBC # BLD AUTO: 2.87 10*6/MM3 (ref 3.77–5.28)
SODIUM BLD-SCNC: 141 MMOL/L (ref 136–144)
WBC NRBC COR # BLD: 2.1 10*3/MM3 (ref 3.4–10.8)

## 2019-06-18 PROCEDURE — 85025 COMPLETE CBC W/AUTO DIFF WBC: CPT | Performed by: NURSE PRACTITIONER

## 2019-06-18 PROCEDURE — 25010000002 VANCOMYCIN 1 G RECONSTITUTED SOLUTION 1 EACH VIAL: Performed by: INTERNAL MEDICINE

## 2019-06-18 PROCEDURE — 25010000002 CEFEPIME PER 500 MG: Performed by: INTERNAL MEDICINE

## 2019-06-18 PROCEDURE — 94799 UNLISTED PULMONARY SVC/PX: CPT

## 2019-06-18 PROCEDURE — 80053 COMPREHEN METABOLIC PANEL: CPT | Performed by: INTERNAL MEDICINE

## 2019-06-18 PROCEDURE — 99239 HOSP IP/OBS DSCHRG MGMT >30: CPT | Performed by: INTERNAL MEDICINE

## 2019-06-18 PROCEDURE — 99233 SBSQ HOSP IP/OBS HIGH 50: CPT | Performed by: INTERNAL MEDICINE

## 2019-06-18 RX ORDER — IPRATROPIUM BROMIDE AND ALBUTEROL SULFATE 2.5; .5 MG/3ML; MG/3ML
3 SOLUTION RESPIRATORY (INHALATION) EVERY 4 HOURS PRN
Qty: 360 ML | Refills: 0 | Status: ON HOLD | OUTPATIENT
Start: 2019-06-18 | End: 2020-01-25 | Stop reason: SDUPTHER

## 2019-06-18 RX ORDER — CEFDINIR 250 MG/5ML
250 POWDER, FOR SUSPENSION ORAL 2 TIMES DAILY
Qty: 25 ML | Refills: 0 | Status: SHIPPED | OUTPATIENT
Start: 2019-06-18 | End: 2020-01-09

## 2019-06-18 RX ORDER — GUAIFENESIN AND CODEINE PHOSPHATE 100; 10 MG/5ML; MG/5ML
10 SOLUTION ORAL
Qty: 118 ML | Refills: 0 | Status: SHIPPED | OUTPATIENT
Start: 2019-06-18 | End: 2020-01-09

## 2019-06-18 RX ORDER — LANSOPRAZOLE
30 KIT EVERY MORNING
Status: DISCONTINUED | OUTPATIENT
Start: 2019-06-18 | End: 2019-06-18 | Stop reason: HOSPADM

## 2019-06-18 RX ORDER — ACETYLCYSTEINE 200 MG/ML
2 SOLUTION ORAL; RESPIRATORY (INHALATION) 2 TIMES DAILY
Qty: 30 VIAL | Refills: 0 | Status: ON HOLD | OUTPATIENT
Start: 2019-06-18 | End: 2020-01-25 | Stop reason: SDUPTHER

## 2019-06-18 RX ADMIN — LEVOTHYROXINE SODIUM 175 MCG: 175 TABLET ORAL at 05:40

## 2019-06-18 RX ADMIN — ANASTROZOLE 1 MG: 1 TABLET ORAL at 11:03

## 2019-06-18 RX ADMIN — Medication 250 MG: at 08:35

## 2019-06-18 RX ADMIN — ACETYLCYSTEINE: 200 SOLUTION ORAL; RESPIRATORY (INHALATION) at 06:35

## 2019-06-18 RX ADMIN — Medication 3 ML: at 08:36

## 2019-06-18 RX ADMIN — GUAIFENESIN AND CODEINE PHOSPHATE 10 ML: 10; 100 LIQUID ORAL at 05:40

## 2019-06-18 RX ADMIN — LANSOPRAZOLE 30 MG: KIT at 11:03

## 2019-06-18 RX ADMIN — AMLODIPINE BESYLATE 5 MG: 5 TABLET ORAL at 08:34

## 2019-06-18 RX ADMIN — IPRATROPIUM BROMIDE AND ALBUTEROL SULFATE 3 ML: .5; 3 SOLUTION RESPIRATORY (INHALATION) at 06:35

## 2019-06-18 RX ADMIN — GUAIFENESIN AND CODEINE PHOSPHATE 10 ML: 10; 100 LIQUID ORAL at 08:34

## 2019-06-18 RX ADMIN — HEPARIN SODIUM (PORCINE) LOCK FLUSH IV SOLN 100 UNIT/ML 500 UNITS: 100 SOLUTION at 15:23

## 2019-06-18 RX ADMIN — LISINOPRIL 40 MG: 20 TABLET ORAL at 08:35

## 2019-06-18 RX ADMIN — GUAIFENESIN AND CODEINE PHOSPHATE 10 ML: 10; 100 LIQUID ORAL at 15:23

## 2019-06-18 RX ADMIN — CEFEPIME 2 G: 2 INJECTION, POWDER, FOR SOLUTION INTRAVENOUS at 11:03

## 2019-06-18 RX ADMIN — SODIUM CHLORIDE 1000 MG: 900 INJECTION, SOLUTION INTRAVENOUS at 04:10

## 2019-06-18 NOTE — DISCHARGE SUMMARY
"  Date of Admission: 6/16/2019    Date of Discharge:  6/18/2019    Length of stay:  LOS: 1 day     Admission Diagnosis:  Acute respiratory insufficiency    Discharge Diagnosis:   Acute respiratory insufficiency  -d/t uncontrolled secreations and bronchitis  -CXR showed \"Mild hazy bibasilar airspace opacities, likely atelectasis or perhaps pneumonia.\"    -favor more atelectasis , stop Vanc  -c/w omnicef for short course  - pneumococcal and legionella urine negative  -BCx NG 2 days   -Trach care  -Mucinex q4 per tube, mucomyst nebs     Oral cancer with radical neck dissection and glossectomy with reconstruction s/p PEG and trach  -PEG changed out last Thursday  -oncology following  -c/w TF   -Continue Ativan     H/o Breast cancer  -Continue Arimidex      Neutropenia/Panctyopenia  -WBC 2.0 but no fevers  -hgb stable 8.4  -repeat CBC at oncology follow up      Normocytic anemia  -Hgb  at patient's baseline      Chronic hypertension,controlled  -Continue Norvasc and Lisinopril     GERD  -Continue PPI      Hypothyroidism  -Continue Levothyroxine        Presenting Problem/History of Present Illness    Ms. Sheikh is a 68 year old  female with a past medical history significant for head and neck cancer s/p  glossectomy with reconstruction and radical neck dissection, s/p trach and PEG, chronic HTN, hypothyroidism, s/p breast cancer, GERD, and chronic pain with narcotic dependence.  She reports to the ER tonight with her daughter who provides the history. She states that she after she was discharged May 22nd, she was able to finish 8 days out of 10 of her Omnicef.  She had to stop the antibiotic due to diarrhea.  Her daughter states she continued to take Mucinex and had good control of her sputum production.        Hospital Course  Patient is a 68 y.o. female presented with above noted issues.  She was admitted and improved after mucomyst nebs were added. She will bed sent home on short abx course as likely patient " has more bronchitis than PNA. She will cont to follow with oncology as outpt.     Past Medical History:     Past Medical History:   Diagnosis Date   • Anesthesia complication     developed myalgia after anesthesia 6/2017 then after that anesthesia aware and hadn't had any problems  daes 4/18/18 note states what anesthesia she got and awaiting [place] anesthesia report. notified anesthea dept at [facility] and they wanted all notes    • Aortic stenosis 6/17/2019   • Arthritis    • Breast cancer (CMS/HCC)     right breast had chemo & radiation    • Cancer (CMS/HCC)     right breast cancer   • Disease of thyroid gland    • GERD (gastroesophageal reflux disease)     rarely   • Heart murmur    • Hypertension    • Hypertension 6/17/2019   • Hypothyroidism    • Port-A-Cath in place     left side chest       Past Surgical History:     Past Surgical History:   Procedure Laterality Date   • BREAST LUMPECTOMY WITH AXILLARY NODE DISSECTION Right    • BREAST SURGERY      biopsy   • CARDIAC CATHETERIZATION     • CATARACT EXTRACTION Bilateral    • COLONOSCOPY     • ENDOSCOPY     • HEAD/NECK LESION/CYST EXCISION Right 6/14/2018    Procedure: RT. NECK INCISION AND DRAINAGE;  Surgeon: Toan Reed MD;  Location: [facility] OR;  Service: ENT   • HYSTERECTOMY       Includes complete hysterectomy secondary to fibroid tumor.    • NECK DISSECTION Right 5/31/2018    Procedure: RIGHT MODIFIED NECK DISSECTION;  Surgeon: Toan Reed MD;  Location: [facility];  Service: ENT   • TONGUE LESION EXCISION/BIOPSY Right     x2   • VENOUS ACCESS DEVICE (PORT) INSERTION      left       Social History:   Social History     Socioeconomic History   • Marital status:      Spouse name: Not on file   • Number of children: Not on file   • Years of education: Not on file   • Highest education level: Not on file   Tobacco Use   • Smoking status: Never Smoker   • Smokeless tobacco: Never Used   Substance and Sexual Activity   •  Alcohol use: No   • Drug use: No   • Sexual activity: Defer   Social History Narrative    The patient is .  She has one adult child.  She does not smoke, does not drink alcohol.  She works on assembly .       Procedures Performed:none         Consults:   Consults     Date and Time Order Name Status Description    6/16/2019 2306 Hematology & Oncology Inpatient Consult      6/16/2019 2231 IP Consult to Internal Medicine Completed           Pertinent Test Results:     Lab Results (last 72 hours)     Procedure Component Value Units Date/Time    Comprehensive Metabolic Panel [[id number]]  (Abnormal) Collected:  06/18/19 0825    Specimen:  Blood Updated:  06/18/19 0943     Glucose 113 mg/dL      BUN 16 mg/dL      Creatinine 0.50 mg/dL      Sodium 141 mmol/L      Potassium 3.3 mmol/L      Chloride 102 mmol/L      CO2 26.0 mmol/L      Calcium 9.1 mg/dL      Total Protein 5.9 g/dL      Albumin 2.90 g/dL      ALT (SGPT) 14 U/L      AST (SGOT) 17 U/L      Alkaline Phosphatase 71 U/L      Total Bilirubin 0.6 mg/dL      eGFR Non African Amer 123 mL/min/1.73      Globulin 3.0 gm/dL      A/G Ratio 1.0 g/dL      BUN/Creatinine Ratio 32.0     Anion Gap 13.0 mmol/L     CBC & Differential [[id number]] Collected:  06/18/19 0825    Specimen:  Blood Updated:  06/18/19 0925    Narrative:       The following orders were created for panel order CBC & Differential.  Procedure                               Abnormality         Status                     ---------                               -----------         ------                     CBC Auto Differential[[id number]]        Abnormal            Final result                 Please view results for these tests on the individual orders.    CBC Auto Differential [[id number]]  (Abnormal) Collected:  06/18/19 0825    Specimen:  Blood Updated:  06/18/19 0925     WBC 2.10 10*3/mm3      RBC 2.87 10*6/mm3      Hemoglobin 8.3 g/dL      Hematocrit 25.0 %      MCV 87.1 fL      MCH  29.1 pg      MCHC 33.4 g/dL      RDW 23.1 %      RDW-SD 69.6 fl      MPV 7.7 fL      Platelets 216 10*3/mm3      Neutrophil % 59.5 %      Lymphocyte % 21.9 %      Monocyte % 17.4 %      Eosinophil % 0.8 %      Basophil % 0.4 %      Neutrophils, Absolute 1.30 10*3/mm3      Lymphocytes, Absolute 0.50 10*3/mm3      Monocytes, Absolute 0.40 10*3/mm3      Eosinophils, Absolute 0.00 10*3/mm3      Basophils, Absolute 0.00 10*3/mm3      nRBC 0.3 /100 WBC     Blood Culture - Blood, Chest, Left [[id number]] Collected:  06/16/19 2253    Specimen:  Blood from Chest, Left Updated:  06/17/19 2330     Blood Culture No growth at 24 hours    Blood Culture - Blood, Arm, Left [[id number]] Collected:  06/16/19 2259    Specimen:  Blood from Arm, Left Updated:  06/17/19 2315     Blood Culture No growth at 24 hours    Creatinine, Serum [[id number]]  (Normal) Collected:  06/17/19 0436    Specimen:  Blood Updated:  06/17/19 0526     Creatinine 0.50 mg/dL      eGFR Non African Amer 123 mL/min/1.73     Lactic Acid, Reflex [[id number]]  (Normal) Collected:  06/17/19 0436    Specimen:  Blood Updated:  06/17/19 0525     Lactate 1.4 mmol/L     Legionella Antigen, Urine - Urine, Urine, Clean Catch [[id number]]  (Normal) Collected:  06/16/19 1938    Specimen:  Urine, Clean Catch Updated:  06/17/19 0355     LEGIONELLA ANTIGEN, URINE Negative    S. Pneumo Ag Urine or CSF - Urine, Urine, Clean Catch [[id number]]  (Normal) Collected:  06/16/19 1938    Specimen:  Urine, Clean Catch Updated:  06/17/19 0354     Strep Pneumo Ag Negative    Lactic Acid, Reflex Timer (This will reflex a repeat order 3-3:15 hours after ordered.) [[id number]] Collected:  06/16/19 2314    Specimen:  Blood Updated:  06/17/19 0230     Extra Tube Hold for add-ons.     Comment: Auto resulted.       POC Lactate [[id number]]  (Normal) Collected:  06/16/19 2314    Specimen:  Blood Updated:  06/16/19 2319     Lactate 2.20 mmol/L      Comment: Serial Number: [id number]:  [id number]        BNP [[id number]]  (Normal) Collected:  06/16/19 2007    Specimen:  Blood Updated:  06/16/19 2124     BNP 25.0 pg/mL      Comment: Results may be falsely decreased if patient taking Biotin.       [facility] Draw [[id number]] Collected:  06/16/19 2007    Specimen:  Blood Updated:  06/16/19 2115    Narrative:       The following orders were created for panel order [facility] Draw.  Procedure                               Abnormality         Status                     ---------                               -----------         ------                     Light Blue Top[[id number]]                                   Final result               Green Top (Gel)[[id number]]                                  Final result               Lavender Top[[id number]]                                     Final result               Gold Top - SST[[id number]]                                   Final result                 Please view results for these tests on the individual orders.    Light Blue Top [[id number]] Collected:  06/16/19 2007    Specimen:  Blood Updated:  06/16/19 2115     Extra Tube hold for add-on     Comment: Auto resulted       Green Top (Gel) [[id number]] Collected:  06/16/19 2007    Specimen:  Blood Updated:  06/16/19 2115     Extra Tube Hold for add-ons.     Comment: Auto resulted.       Lavender Top [[id number]] Collected:  06/16/19 2007    Specimen:  Blood Updated:  06/16/19 2115     Extra Tube hold for add-on     Comment: Auto resulted       Gold Top - SST [[id number]] Collected:  06/16/19 2007    Specimen:  Blood Updated:  06/16/19 2115     Extra Tube Hold for add-ons.     Comment: Auto resulted.       Troponin [[id number]]  (Normal) Collected:  06/16/19 2007    Specimen:  Blood Updated:  06/16/19 2053     Troponin I <0.030 ng/mL     Narrative:       Troponin I Reference Range:    0.00-0.03  Negative.  Repeat testing in 4-6 hours if clinically indicated.    0.04-0.29  Suspicious for myocardial injury. Serial measurements and clinical   correlation may be necessary to confirm or exclude diagnosis of acute  coronary syndrome.  Repeat testing in 4-6 hours if indicated.     >0.29 Consistent with myocardial injury.  Recommend clinical and laboratory correlation.     Results my be falsely decreased if patient taking Biotin.     CBC & Differential [[id number]] Collected:  06/16/19 2007    Specimen:  Blood Updated:  06/16/19 2048    Narrative:       The following orders were created for panel order CBC & Differential.  Procedure                               Abnormality         Status                     ---------                               -----------         ------                     CBC Auto Differential[[id number]]        Abnormal            Final result                 Please view results for these tests on the individual orders.    CBC Auto Differential [[id number]]  (Abnormal) Collected:  06/16/19 2007    Specimen:  Blood Updated:  06/16/19 2048     WBC 3.10 10*3/mm3      RBC 3.06 10*6/mm3      Hemoglobin 8.8 g/dL      Hematocrit 26.5 %      MCV 86.5 fL      MCH 28.9 pg      MCHC 33.4 g/dL      RDW 22.5 %      RDW-SD 67.4 fl      MPV 7.7 fL      Platelets 254 10*3/mm3      Neutrophil % 64.9 %      Lymphocyte % 19.8 %      Monocyte % 14.6 %      Eosinophil % 0.4 %      Basophil % 0.3 %      Neutrophils, Absolute 2.00 10*3/mm3      Lymphocytes, Absolute 0.60 10*3/mm3      Monocytes, Absolute 0.50 10*3/mm3      Eosinophils, Absolute 0.00 10*3/mm3      Basophils, Absolute 0.00 10*3/mm3      nRBC 0.3 /100 WBC     Comprehensive Metabolic Panel [[id number]]  (Abnormal) Collected:  06/16/19 2007    Specimen:  Blood Updated:  06/16/19 2048     Glucose 109 mg/dL      BUN 19 mg/dL      Creatinine 0.60 mg/dL      Sodium 136 mmol/L      Potassium 3.9 mmol/L      Chloride 95 mmol/L      CO2 27.0 mmol/L      Calcium 9.1 mg/dL      Total Protein 6.4 g/dL      Albumin 3.20 g/dL      ALT (SGPT) 14 U/L      AST (SGOT) 19 U/L      Alkaline Phosphatase 83 U/L       Total Bilirubin 0.4 mg/dL      eGFR Non African Amer 100 mL/min/1.73      Globulin 3.2 gm/dL      A/G Ratio 1.0 g/dL      BUN/Creatinine Ratio 31.7     Anion Gap 14.0 mmol/L     Urinalysis With Culture If Indicated - Urine, Clean Catch [[id number]]  (Normal) Collected:  06/16/19 1938    Specimen:  Urine, Clean Catch Updated:  06/16/19 1946     Color, UA Yellow     Appearance, UA Clear     pH, UA 7.5     Specific Gravity, UA 1.010     Glucose, UA Negative     Ketones, UA Negative     Bilirubin, UA Negative     Blood, UA Negative     Protein, UA Negative     Leuk Esterase, UA Negative     Nitrite, UA Negative     Urobilinogen, UA 0.2 E.U./dL    Narrative:       Urine microscopic not indicated.               Blood Culture   Date Value Ref Range Status   06/16/2019 No growth at 24 hours  Preliminary   06/16/2019 No growth at 24 hours  Preliminary      No results found for: URINECX   No results found for: WOUNDCX   No results found for: RESPCX   No results found for: STOOLCX   No results found for: STOOLCXY   No results found for: MRSACX   No results found for: VRECX   No results found for: CRECX   No components found for: AFBSTAINCX   No results found for: AFBCX   No results found for: AFBCXBLD   No results found for: FUNGUSCX   No components found for: GMSSTAIN   No results found for: KOHPREP   No results found for: ANACX   No results found for: BODYFLDCX   No results found for: CSFCX   No results found for: CULTURE   No results found for: THROATCX   No results found for: THROATCXBS   No results found for: ICECX   No results found for: DICECX   No results found for: GCCX      Results for orders placed during the hospital encounter of 08/28/18   Adult Transthoracic Echo Complete W/ Cont if Necessary Per Protocol    Narrative                           Adult Echocardiogram Report        [facility]  [address] [place]    [address]        Name: KATJA MCMAHON SStudy Date: 08/28/2018  02:54 PM         BP: 129/74 mmHg  MRN: [id number]         Patient Location:   : 1950        Gender: Female                          Height: 64 in  Age: 67 yrs            Account#: [id number]                   Weight: 135 lb  Reason For Study: AS,HTN                                       BSA: 1.7 m2  Ordering Physician:  RAFAEL WHITLEY  Referring Physician:  AUBREY GARCIA  Performed By: MB      M-Mode/2-D Measurements:  LVIDd: 4.9 cm       (3.7-5.7) LVPWd: 0.79 cm       (0.8-1.2)  LVIDs: 2.9 cm       (2.3-3.9)  ACS: 1.1 cm         (1.6-3.7) IVSd: 0.78 cm        (0.7-1.2)  LA dimension: 3.3 cm(1.9-4.0) RVDd: 2.6 cm         (0.7-2.4)  FS: 41.1 %          (21-40%)  Ao root diam: 3.2 cm (2.0-3.7)    Procedure  A 2D, M-mode, color flow and doppler echocardiogram was performed.    Comments  Left ventricular size and contractility is within normal limits. Ejection  Fraction is 55-60%. The right ventricle is normal in size and function. The  left atrial size is normal. Right atrial size is normal. The mitral valve  leaflets appear thickened, but open well. There is no mitral regurgitation  noted. The tricuspid valve is normal. There is mild tricuspid regurgitation.  Right ventricular systolic pressure is elevated at 30-40mmHg. The aortic valve  is not well visualized. Valve is not well visualized appears to have decreased  cusp separation with peak velocity of over 3 m/sec and with a peak gradient of  56 mm of mercury and a valve area of 1.1 probably moderate aortic stenosis. No  aortic regurgitation is present. The pulmonic valve is not well visualized.  The aortic root is normal size. There is no pericardial effusion.      Interpretation  L    eft ventricular size and contractility is within normal limits  Ejection Fraction is 55-60%  There is mild tricuspid regurgitation.  Right ventricular systolic pressure is elevated at 30-40mmHg.  Valve is not well visualized appears to have decreased cusp separation  with  peak velocity of over 3 m/sec and with a peak gradient of 56 mm of mercury and  a valve area of 1.1 probably moderate aortic stenosis      MMode/2D Measurements & Calculations  ESV(Teich): 31.9 ml                     Ao root area: 8.0 cm2  EF(Teich): 71.8 %  Asc Aorta Diam: 4.0 cm                  LVOT diam: 2.0 cm  EDV(MOD-sp4): 53.7 ml  ESV(MOD-sp4): 23.9 ml  EF(MOD-sp4): 55.5 %    Doppler Measurements & Calculations  MV E max chelsey: 82.0 cm/sec                MV max P.5 mmHg  MV A max chelsey: 109.0 cm/sec               MV mean PG: 3.0 mmHg  MV E/A: 0.75  MV dec slope: 402.9 cm/sec2              Ao V2 max: 296.6 cm/sec  MV dec time: 0.20 sec                    Ao max P.2 mmHg                                           Ao V2 mean: 236.4 cm/sec                                           Ao mean P.5 mmHg                                           Ao V2 VTI: 56.2 cm                                           GODFREY(I,D): 1.1 cm2                                             GODFREY(V,D): 1.1 cm2  LV V1 max P.1 mmHg                   PA max P.3 mmHg  LV V1 mean P.4 mmHg  LV V1 max: 101.8 cm/sec  LV V1 mean: 75.5 cm/sec  LV V1 VTI: 19.0 cm  TR max chelsey: 248.8 cm/sec  TR max P.8 mmHg  RVSP(TR): 34.8 mmHg    _______________________________________________________________________________      Electronically signed by: Benji Jhaveri MD  on 2018 01:21 PM         Imaging Results (all)     Procedure Component Value Units Date/Time    XR Chest 2 View [[id number]] Collected:  19     Updated:  19    Narrative:       DATE OF EXAM:  2019 7:38 PM     PROCEDURE:  XR CHEST 2 VW-     INDICATIONS:  CHF/COPD Protocol, chest pain     COMPARISON:  Portable chest x-ray 2019     TECHNIQUE:   Two radiologic views of the chest , PA and lateral were obtained.     FINDINGS:  Tip of the tracheostomy tube terminates above the adi. Tip of the  left subclavian central venous port  catheter is stable. Cardiac  silhouette does not appear enlarged. There are mild hazy bibasilar  airspace opacities. No pneumothorax or pleural effusion is seen. There  are degenerative changes in the mid to lower thoracic spine.        Impression:       Mild hazy bibasilar airspace opacities, likely atelectasis or perhaps  pneumonia.     Electronically Signed By-Kerrie Paredes On:6/16/2019 7:52 PM  This report was finalized on [id number] by  Kerrie Paredes, .            Condition on Discharge:  stable    Vital Signs  Temp:  [97.8 °F (36.6 °C)-98.4 °F (36.9 °C)] 98.4 °F (36.9 °C)  Heart Rate:  [82-95] 93  Resp:  [16-18] 18  BP: (111-136)/(62-78) 120/62    Physical Exam:  Gen: NAD  HEENT: EOMI  Neck: trach in place  Heart: RRR, no murmur  Lung: CTA b/l, adequate air movement  ABD: soft, G tube in place  MSK: moves ext spontaneously  Neuro: AO x 3  Psych: no anxiety  Skin: warm, dry, intact  Extremities:  No edema    Discharge Disposition  Home or Self Care    Discharge Medications     Discharge Medications      New Medications      Instructions Start Date   acetylcysteine 20 % nebulizer solution  Commonly known as:  MUCOMYST   2 mL, Nebulization, 2 Times Daily      guaifenesin-codeine 100-10 MG/5ML liquid  Commonly known as:  GUAIFENESIN AC   10 mL, Per G Tube, Every 4 Hours Scheduled      ipratropium-albuterol 0.5-2.5 mg/3 ml nebulizer  Commonly known as:  DUO-NEB   3 mL, Nebulization, Every 4 Hours PRN         Continue These Medications      Instructions Start Date   amLODIPine 5 MG tablet  Commonly known as:  NORVASC   5 mg, Oral, Every Morning      anastrozole 1 MG tablet  Commonly known as:  ARIMIDEX   1 mg, Oral, Daily      lisinopril 40 MG tablet  Commonly known as:  PRINIVIL,ZESTRIL   40 mg, Oral, Every Morning      LORazepam 0.5 MG tablet  Commonly known as:  ATIVAN   0.5 mg, Oral, Every 6 Hours PRN, for anxiety      omeprazole 40 MG capsule  Commonly known as:  priLOSEC   40 mg, Oral, Daily      oxyCODONE  10 MG tablet  Commonly known as:  ROXICODONE   TAKE 1 TO 2 TABLETS BY MOUTH EVERY 6 HOURS      PROBIOTIC DAILY PO   1 capsule, Oral, Daily      SYNTHROID 175 MCG tablet  Generic drug:  levothyroxine   175 mcg, Oral, Daily             Discharge Diet:   Diet Instructions     Diet: Tube Feeding; Bolus; Isosource      Discharge Diet:  Tube Feeding    Feeding Type:  Bolus    Formula, Amount & Frequency:  Isosource          Activity at Discharge:     Follow-up Appointments  No future appointments.  Additional Instructions for the Follow-ups that You Need to Schedule     Discharge Follow-up with Specialty: oncology; 1 Week   As directed      Specialty:  oncology    Follow Up:  1 Week    Follow Up Details:  will also need CBC at that time               Test Results Pending at Discharge   Order Current Status    Blood Culture - Blood, Arm, Left Preliminary result    Blood Culture - Blood, Chest, Left Preliminary result           Risk for Readmission (LACE) Score: 7 (6/18/2019  6:00 AM)          Ab Almanzar DO  06/18/19  11:27 AM    Time: Discharge 34 min

## 2019-06-18 NOTE — PROGRESS NOTES
HEMATOLOGY/ONCOLOGY PROGRESS NOTE      Patient name: Elma Sheikh   YOB: 1950  Referring Provider:VAL Cole  Reason for Consultation: Head and neck carcinoma   Date of Service: 06/18/19    Chief Complaint: Cough with bloody sputum    History of Present Illness:  Ms. Sheikh is a 68 y.o.  female with a past medical history of head and neck cancer s/p glossectomy with reconstruction and radical neck dissection, s/p tracheseostomy and PEG, chronic HTN, hypothyroidism, s/p breast cancer, GERD, and chronic pain with narcotic dependence.  She reported to [facility] ED on 6/16/19 with her daughter. Per her report, after she was discharged on 5/22/19, she was able to finish 8 days out of 10 of her Omnicef.  She had to stop the antibiotic due to diarrhea.  Her daughter stated she continued to take Mucinex and had good control of her sputum production.  She started to back off of the Mucinex and her sputum became very thick and was unable to get any sputum with trach suctioning.  She also reported some bloody sputum. She also had a feeding tube changed out last Thursday for a smaller tube.  She stated she was not able  to get her PPI granules through the tube and believed it has contributed to the blood in her sputum. She last had radiation 2 weeks ago. Labs in the ER were essentially unremarkable with continued neutropenia. A CXR in the ED showed mild hazy bibasilar airspace opacities, likely atelectasis or perhaps pneumonia. She was admitted for further workup and evaluation.      6/18/19 Hematology/Oncology was consulted on this patient and known to our service.  She was followed by Dr. Davis for head and neck carcinoma.  She is currently receiving treatment through [facility].  She is known to our service and had been followed by Dr. Davis for LCIS and atypical lobular hyperplasia diagnosed 2014, BLANCO diagnosed 2016, invasive mammary carcinoma with invasive features (ER+, PA-  and [id number]-) of the right breast diagnosed October 2017, and squamous cell carcinoma of the tongue diagnosed 2018.  She was originally seen by us for her LCIS and had undergone resection followed by tamoxifen completed in 2017. Genetic screening with William was negative in 2014. BLANCO was noted in 2016. She was started on oral iron and referred to GI. EGD 9/26/19 showed grade 4 erosive esophagitis, distal esophageal stricture and grade 3 to 4 erosive duodenitis as well as erosive gastritis.  Patient was placed on Prilosec.  Colonoscopy revealed hemorrhoids, otherwise negative.  Pathology did not reveal any abnormalities. For her right breast invasive mammary carcinoma, she had undergone a lumpectomy followed by adjuvant chemotherapy with Adriamycin and Cytoxan with 4 cycles completed February 2018 and radiation to the right breast completed 5/2018.  She was started on adjuvant Arimidex July 2018. In April 2018 she was diagnosed with squamous cell carcinoma of the right lateral tongue.  She underwent partial glossectomy 05/31/18 with lymph node dissection revealing stage III disease and initially was treated with adjuvant radiation.  A CT scan on 08/28/18 showed a 12 mm sclerotic lesion in T2 however PET was negative.  MRI had shown nonspecific changes and T8 and T12.  Bone scan was negative.  She completed adjuvant tongue radiation 10/18/2018.  She was last seen as an outpatient 11/2018 where follow-up PET scan was ordered for February 2019. Adjuvant Arimidex was continued for her right breast cancer.  She was evaluated January 2019 by Dr. Younger, ENT where she reported continued right tongue pain. Biopsies were taken of the right tongue revealing invasive moderately differentiated keratinizing squamous cell carcinoma.  She transferred care to [facility] and was evaluated by Dr. Joseph Valentino. Restaging PET showed a large hypermetabolic mass of the right oral tongue and right femur, 2  hypermetabolic small sub cm right neck lymph nodes, there is no evidence of distant metastatic disease.  There was suspicious focality with associated calcification in the left thyroid lobe with recommendation for thyroid ultrasound. She was most recently admitted 5/19/19 to 5/22/19 for pancytopenia/febrile neutropenia.      PCP: Lorenza Parham  Oncology:  Stalin    Subjective:     Review of Systems   Constitutional: Negative for appetite change, chills, fatigue, fever and unexpected weight change.   HENT: Negative for congestion, hearing loss, mouth sores, nosebleeds, postnasal drip, rhinorrhea and sore throat.    Eyes: Negative for photophobia, pain and visual disturbance.   Respiratory: Positive for cough ( productive of sputum ) and shortness of breath. Negative for chest tightness and wheezing.    Cardiovascular: Negative for chest pain, palpitations and leg swelling.   Gastrointestinal: Negative for abdominal distention, abdominal pain, constipation, diarrhea, nausea and vomiting.   Genitourinary: Negative for difficulty urinating, dysuria and hematuria.   Musculoskeletal: Negative for arthralgias, back pain, gait problem and myalgias.   Skin: Negative for pallor and rash.   Neurological: Negative for dizziness, weakness, light-headedness, numbness and headaches.   Hematological: Negative for adenopathy. Does not bruise/bleed easily.   Psychiatric/Behavioral: Negative for agitation, confusion and dysphoric mood. The patient is not nervous/anxious.    A comprehensive 14 point review of systems was performed and was negative except as mentioned.    Past Medical History, Past Surgical History, Social History, Family History have been reviewed and are without significant changes except as mentioned.    Medications:  Current Facility-Administered Medications   Medication Dose Route Frequency Provider Last Rate Last Dose   • acetylcysteine (MUCOMYST) 20 % solution 2 mL  2 mL Nebulization Q12H Ab Almanzar DO  "      • amLODIPine (NORVASC) tablet 5 mg  5 mg Per G Tube QAM Julia Nieto, APRN   5 mg at 06/18/19 0834   • anastrozole (ARIMIDEX) tablet 1 mg  1 mg Per G Tube Daily Julia Nieto, APRN   1 mg at 06/17/19 0941   • cefepime 2 gm IVPB in 100 ml NS (MBP)  2 g Intravenous Q12H Ab Almanzar, DO   2 g at 06/17/19 2209   • guaifenesin-codeine (GUAIFENESIN AC) 100-10 MG/5ML liquid 10 mL  10 mL Per G Tube Q4H WesJulia Niño, APRN   10 mL at 06/18/19 0834   • ipratropium-albuterol (DUO-NEB) nebulizer solution 3 mL  3 mL Nebulization Q4H PRN Ab Almanzar, DO   3 mL at 06/18/19 0635   • lansoprazole (FIRST) oral suspension 30 mg  30 mg Per G Tube QAM Ab Almanzar, DO       • levothyroxine (SYNTHROID, LEVOTHROID) tablet 175 mcg  175 mcg Per G Tube Q AM Julia Nieto, APRN   175 mcg at 06/18/19 0540   • lisinopril (PRINIVIL,ZESTRIL) tablet 40 mg  40 mg Per G Tube QAM Julia Nieto, APRN   40 mg at 06/18/19 0835   • LORazepam (ATIVAN) tablet 0.5 mg  0.5 mg Per G Tube Q6H PRN Julia Nieto, APRN       • oxyCODONE (ROXICODONE) immediate release tablet 10 mg  10 mg Per G Tube Nightly PRN Julia Nieto, APRN       • saccharomyces boulardii (FLORASTOR) capsule 250 mg  250 mg Per G Tube Daily Julia Nieto, APRN   250 mg at 06/18/19 0835   • sodium chloride 0.9 % flush 10 mL  10 mL Intravenous PRN Jammie León PA-C   10 mL at 06/16/19 2012   • sodium chloride 0.9 % flush 3 mL  3 mL Intravenous Q12H Julia Nieto, APRN   3 mL at 06/18/19 0836   • sodium chloride 0.9 % flush 3-10 mL  3-10 mL Intravenous PRN Wes-Julia Puente APRN           Allergies:    Allergies   Allergen Reactions   • Sulfa Antibiotics Swelling     Face, neck, trouble swallowing   • Succinylcholine Other (See Comments)     extreme muscle weakness        Objective    /75 (BP Location: Left arm, Patient Position: Lying)   Pulse 93   Temp 98 °F (36.7 °C) (Axillary)   Resp 18   Ht 165.1 cm (65\")   Wt 56.4 kg " (124 lb 5.4 oz)   SpO2 98%   BMI 20.69 kg/m²     Physical Exam   Constitutional: She is oriented to person, place, and time. She appears well-developed and well-nourished. No distress.   HENT:   Head: Normocephalic and atraumatic.   Mouth/Throat: Oropharynx is clear and moist. No oropharyngeal exudate.   Trach in place   Eyes: Conjunctivae and EOM are normal.   Neck: Normal range of motion. Neck supple. No JVD present. No thyromegaly present.   Cardiovascular: Normal rate, regular rhythm, normal heart sounds and intact distal pulses.   No murmur heard.  Pulmonary/Chest: Effort normal and breath sounds normal. No respiratory distress.   Left chest wall port    Abdominal: Soft. Bowel sounds are normal. There is no tenderness. There is no rebound and no guarding.   Genitourinary:   Genitourinary Comments: Deferred   Musculoskeletal: Normal range of motion. She exhibits no edema or tenderness.   PIV left extremity   Neurological: She is alert and oriented to person, place, and time. No sensory deficit.   Skin: Skin is warm and dry. No rash noted. She is not diaphoretic. No erythema. No pallor.   Psychiatric: She has a normal mood and affect. Her behavior is normal.   Vitals reviewed.      Labs:  Lab Results   Component Value Date    WBC 2.10 (L) 06/18/2019    HGB 8.3 (L) 06/18/2019    HCT 25.0 (L) 06/18/2019    MCV 87.1 06/18/2019     06/18/2019     Lab Results   Component Value Date    GLUCOSE 113 (H) 06/18/2019    BUN 16 06/18/2019    CREATININE 0.50 06/18/2019    EGFRIFNONA 123 06/18/2019    EGFRIFAFRI >60 01/09/2019    BCR 32.0 (H) 06/18/2019    K 3.3 (L) 06/18/2019    CO2 26.0 06/18/2019    CALCIUM 9.1 06/18/2019    ALBUMIN 2.90 (L) 06/18/2019    LABIL2 0.6 (L) 05/22/2019    AST 17 06/18/2019    ALT 14 06/18/2019     Pending Labs:  Blood cultures (final), respiratory viral panel    Assessment/Plan   Assessment:   1. Pancytopenia/febrile neutropenia: likely due to chemo/XRT effect on active chemo and  increased risk of cytopenias with h/o prior chemo in past. H/o BLANCO in 2016 treated with oral iron and scopes done 9/2016. EGD showed  erosive esophagitis, distal esophageal stricture and grade 3 to 4 erosive duodenitis as well as erosive gastritis.  Colonoscopy revealed hemorrhoids, otherwise negative. PEG changed 06/13/19. Continue Ativan.  WBC 2.1, Hgb 8.3. Plt 216.     2. Progressive stage III squamous cell ca of right tongue: h/o resection radical neck dissection and glossectomy with reconstruction s/p PEG and trach and adjuvant XRT with local progression 2/2019.  Patient has completed chemotherapy and radiation at [facility] 3 weeks ago.     2. Right breast cancer: s/p resection and adjuvant chemo. On daily Arimidex. Pt reported mammogram neg 12/2018.     3. Pneumonia:  CXR showed mild hazy bibasilar airspace opacities, likely atelectasis or perhaps pneumonia. Started on Cefepime and vancomycin. Negative pneumococcal and legionella urine. Respiratory viral panel pending. Follow up blood cultures NGTD. Trachrostomy care. Mucinex q4 per tube and Mucomyst nebs.      4. GERD/Hypothyroidism/Hypertension/Aortic stenosis: per primary team     5. GERD/DVT Prophy: pantoprazole/bilateral SCDs     Plan:  1. CBC daily   2. Transfuse pRBC PRN to keep Hgb above 7.5  3. Transfuse plts PRN less than 20K  4. Hold Lovenox if plts drop below 50K  5. Continue Arimidex   6. Possible discharge in 1-2 days      This note was electronically signed by VAL Arvizu on 6/16/2019 8:23 AM.       Patient seen and examined. History reviewed. Notes reviewed and edited. As above. She is clinically better today. Lung sounds have also improved. Anticipate discharge to home soon. She has imaging studies pending end of June. Discussed with patient and family members.    Elma Sheikh  6/18/2019  2:59 PM                          6/18/2019

## 2019-06-19 ENCOUNTER — READMISSION MANAGEMENT (OUTPATIENT)
Dept: CALL CENTER | Facility: HOSPITAL | Age: 69
End: 2019-06-19

## 2019-06-19 NOTE — PROGRESS NOTES
Case Management Discharge Note         Destination      No service has been selected for the patient.      Durable Medical Equipment      No service has been selected for the patient.      Dialysis/Infusion      No service has been selected for the patient.      Home Medical Care      No service has been selected for the patient.      Therapy      No service has been selected for the patient.      Community Resources      No service has been selected for the patient.             Final Discharge Disposition Code: 06 - home with home health care

## 2019-06-19 NOTE — OUTREACH NOTE
Prep Survey      Responses   Facility patient discharged from?  [facility]   Is patient eligible?  Yes   Discharge diagnosis  Acute respiratory insufficiency, bronchitis    Does the patient have one of the following disease processes/diagnoses(primary or secondary)?  Other   Does the patient have Home health ordered?  Yes   What is the Home health agency?   [facility]   Is there a DME ordered?  No   General alerts for this patient  trach and PEG   Prep survey completed?  Yes          Mili Brian RN

## 2019-06-20 ENCOUNTER — READMISSION MANAGEMENT (OUTPATIENT)
Dept: CALL CENTER | Facility: HOSPITAL | Age: 69
End: 2019-06-20

## 2019-06-20 NOTE — OUTREACH NOTE
Medical Week 1 Survey      Responses   Facility patient discharged from?  [facility]   Does the patient have one of the following disease processes/diagnoses(primary or secondary)?  Other   Is there a successful TCM telephone encounter documented?  No   Week 1 attempt successful?  Yes   Call start time  1253   Call end time  1255   Discharge diagnosis  acute respirtatory insufficiency, bronchitis   Person spoke with today (if not patient) and relationship  Adrienne   Jimena reviewed with patient/caregiver?  Yes   Is the patient having any side effects they believe may be caused by any medication additions or changes?  No   Does the patient have all medications ordered at discharge?  Yes   Is the patient taking all medications as directed (includes completed medication regime)?  Yes   Does the patient have a primary care provider?   Yes   Does the patient have an appointment with their PCP within 7 days of discharge?  Yes   Has the patient kept scheduled appointments due by today?  N/A   Has home health visited the patient within 72 hours of discharge?  Yes   Did the patient receive a copy of their discharge instructions?  Yes   Week 1 call completed?  Yes   Graduated  Yes   Graduated/Revoked comments  Spoke with daughter and she has everything she needs and all appts and doing well. No needs at this time          Nelsy Silva LPN

## 2019-06-21 LAB
BACTERIA SPEC AEROBE CULT: NORMAL
BACTERIA SPEC AEROBE CULT: NORMAL

## 2019-07-22 ENCOUNTER — TELEPHONE (OUTPATIENT)
Dept: ONCOLOGY | Facility: CLINIC | Age: 69
End: 2019-07-22

## 2019-07-22 NOTE — TELEPHONE ENCOUNTER
Pt's daughter, Adrienne called and stated that the pt needs refills on her Arimidex and she also needs to know when her mammogram is due.  Chart reviewed and next mammogram is due 12/2019.  Per chart, pt should have additional refills remaining for her Arimidex.  Called pt's pharmacy and pt does have refills left and they will get it ready for the pt.  Pt's daughter informed that next mammogram is due in December 2019 and that pt has refills and pharmacy will get it ready for her.  She v/u.

## 2020-01-08 ENCOUNTER — APPOINTMENT (OUTPATIENT)
Dept: GENERAL RADIOLOGY | Facility: HOSPITAL | Age: 70
End: 2020-01-08

## 2020-01-08 ENCOUNTER — HOSPITAL ENCOUNTER (INPATIENT)
Facility: HOSPITAL | Age: 70
LOS: 5 days | Discharge: HOME HEALTH | End: 2020-01-14
Attending: EMERGENCY MEDICINE | Admitting: HOSPITALIST

## 2020-01-08 DIAGNOSIS — R09.02 HYPOXIA: ICD-10-CM

## 2020-01-08 DIAGNOSIS — R06.00 DYSPNEA, UNSPECIFIED TYPE: Primary | ICD-10-CM

## 2020-01-08 DIAGNOSIS — C02.9 TONGUE CANCER (HCC): ICD-10-CM

## 2020-01-08 DIAGNOSIS — J18.9 PNEUMONIA DUE TO INFECTIOUS ORGANISM, UNSPECIFIED LATERALITY, UNSPECIFIED PART OF LUNG: ICD-10-CM

## 2020-01-08 DIAGNOSIS — Z85.3 HISTORY OF RIGHT BREAST CANCER: ICD-10-CM

## 2020-01-08 LAB
ARTERIAL PATENCY WRIST A: POSITIVE
ATMOSPHERIC PRESS: ABNORMAL MM[HG]
BASE EXCESS BLDA CALC-SCNC: 9.3 MMOL/L (ref 0–3)
BDY SITE: ABNORMAL
CO2 BLDA-SCNC: 34.8 MMOL/L (ref 22–29)
HCO3 BLDA-SCNC: 33.5 MMOL/L (ref 21–28)
HEMODILUTION: NO
HOROWITZ INDEX BLD+IHG-RTO: 40 %
MODALITY: ABNORMAL
PCO2 BLDA: 43.4 MM HG (ref 35–48)
PH BLDA: 7.5 PH UNITS (ref 7.35–7.45)
PO2 BLDA: 76.8 MM HG (ref 83–108)
SAO2 % BLDCOA: 96.2 % (ref 94–98)

## 2020-01-08 PROCEDURE — 82803 BLOOD GASES ANY COMBINATION: CPT

## 2020-01-08 PROCEDURE — 80048 BASIC METABOLIC PNL TOTAL CA: CPT | Performed by: EMERGENCY MEDICINE

## 2020-01-08 PROCEDURE — 85007 BL SMEAR W/DIFF WBC COUNT: CPT | Performed by: EMERGENCY MEDICINE

## 2020-01-08 PROCEDURE — 85025 COMPLETE CBC W/AUTO DIFF WBC: CPT | Performed by: EMERGENCY MEDICINE

## 2020-01-08 PROCEDURE — 99284 EMERGENCY DEPT VISIT MOD MDM: CPT

## 2020-01-08 PROCEDURE — 71045 X-RAY EXAM CHEST 1 VIEW: CPT

## 2020-01-08 PROCEDURE — 36600 WITHDRAWAL OF ARTERIAL BLOOD: CPT

## 2020-01-08 RX ORDER — SODIUM CHLORIDE 0.9 % (FLUSH) 0.9 %
10 SYRINGE (ML) INJECTION AS NEEDED
Status: DISCONTINUED | OUTPATIENT
Start: 2020-01-08 | End: 2020-01-14 | Stop reason: HOSPADM

## 2020-01-09 PROBLEM — R06.00 DYSPNEA: Status: ACTIVE | Noted: 2020-01-09

## 2020-01-09 LAB
ANION GAP SERPL CALCULATED.3IONS-SCNC: 11 MMOL/L (ref 5–15)
ANISOCYTOSIS BLD QL: ABNORMAL
BUN BLD-MCNC: 19 MG/DL (ref 8–23)
BUN/CREAT SERPL: 41.3 (ref 7–25)
CALCIUM SPEC-SCNC: 8.9 MG/DL (ref 8.6–10.5)
CHLORIDE SERPL-SCNC: 83 MMOL/L (ref 98–107)
CO2 SERPL-SCNC: 32 MMOL/L (ref 22–29)
CREAT BLD-MCNC: 0.46 MG/DL (ref 0.57–1)
DEPRECATED RDW RBC AUTO: 45.1 FL (ref 37–54)
ERYTHROCYTE [DISTWIDTH] IN BLOOD BY AUTOMATED COUNT: 15.9 % (ref 12.3–15.4)
GFR SERPL CREATININE-BSD FRML MDRD: 135 ML/MIN/1.73
GIANT PLATELETS: ABNORMAL
GLUCOSE BLD-MCNC: 144 MG/DL (ref 65–99)
HCT VFR BLD AUTO: 27.1 % (ref 34–46.6)
HGB BLD-MCNC: 9 G/DL (ref 12–15.9)
LYMPHOCYTES # BLD MANUAL: 0 10*3/MM3 (ref 0.7–3.1)
LYMPHOCYTES NFR BLD MANUAL: 0 % (ref 19.6–45.3)
LYMPHOCYTES NFR BLD MANUAL: 5 % (ref 5–12)
MAGNESIUM SERPL-MCNC: 2.1 MG/DL (ref 1.6–2.4)
MCH RBC QN AUTO: 26.6 PG (ref 26.6–33)
MCHC RBC AUTO-ENTMCNC: 33.2 G/DL (ref 31.5–35.7)
MCV RBC AUTO: 80.3 FL (ref 79–97)
MONOCYTES # BLD AUTO: 0.59 10*3/MM3 (ref 0.1–0.9)
NEUTROPHILS # BLD AUTO: 11.21 10*3/MM3 (ref 1.7–7)
NEUTROPHILS NFR BLD MANUAL: 78 % (ref 42.7–76)
NEUTS BAND NFR BLD MANUAL: 17 % (ref 0–5)
NEUTS VAC BLD QL SMEAR: ABNORMAL
PHOSPHATE SERPL-MCNC: 3.6 MG/DL (ref 2.5–4.5)
PLATELET # BLD AUTO: 403 10*3/MM3 (ref 140–450)
PMV BLD AUTO: 6.3 FL (ref 6–12)
POTASSIUM BLD-SCNC: 4.8 MMOL/L (ref 3.5–5.2)
RBC # BLD AUTO: 3.37 10*6/MM3 (ref 3.77–5.28)
SCAN SLIDE: NORMAL
SODIUM BLD-SCNC: 126 MMOL/L (ref 136–145)
TOXIC GRANULATION: ABNORMAL
TSH SERPL DL<=0.05 MIU/L-ACNC: 0.06 UIU/ML (ref 0.27–4.2)
WBC NRBC COR # BLD: 11.8 10*3/MM3 (ref 3.4–10.8)

## 2020-01-09 PROCEDURE — 84145 PROCALCITONIN (PCT): CPT | Performed by: NURSE PRACTITIONER

## 2020-01-09 PROCEDURE — 25010000002 ENOXAPARIN PER 10 MG: Performed by: NURSE PRACTITIONER

## 2020-01-09 PROCEDURE — 87040 BLOOD CULTURE FOR BACTERIA: CPT | Performed by: EMERGENCY MEDICINE

## 2020-01-09 PROCEDURE — 25010000002 AZITHROMYCIN PER 500 MG: Performed by: EMERGENCY MEDICINE

## 2020-01-09 PROCEDURE — 94799 UNLISTED PULMONARY SVC/PX: CPT

## 2020-01-09 PROCEDURE — 83735 ASSAY OF MAGNESIUM: CPT

## 2020-01-09 PROCEDURE — 84443 ASSAY THYROID STIM HORMONE: CPT | Performed by: NURSE PRACTITIONER

## 2020-01-09 PROCEDURE — 84100 ASSAY OF PHOSPHORUS: CPT

## 2020-01-09 PROCEDURE — 25010000002 CEFTRIAXONE PER 250 MG: Performed by: EMERGENCY MEDICINE

## 2020-01-09 PROCEDURE — 25010000002 METOCLOPRAMIDE PER 10 MG: Performed by: NURSE PRACTITIONER

## 2020-01-09 PROCEDURE — 94640 AIRWAY INHALATION TREATMENT: CPT

## 2020-01-09 PROCEDURE — 99223 1ST HOSP IP/OBS HIGH 75: CPT | Performed by: HOSPITALIST

## 2020-01-09 RX ORDER — LEVOTHYROXINE SODIUM 175 UG/1
175 TABLET ORAL
Status: DISCONTINUED | OUTPATIENT
Start: 2020-01-09 | End: 2020-01-14 | Stop reason: HOSPADM

## 2020-01-09 RX ORDER — GABAPENTIN 300 MG/1
600 CAPSULE ORAL EVERY 8 HOURS SCHEDULED
Status: DISCONTINUED | OUTPATIENT
Start: 2020-01-09 | End: 2020-01-14 | Stop reason: HOSPADM

## 2020-01-09 RX ORDER — LORAZEPAM 0.5 MG/1
0.5 TABLET ORAL EVERY 6 HOURS PRN
Status: DISCONTINUED | OUTPATIENT
Start: 2020-01-09 | End: 2020-01-14 | Stop reason: HOSPADM

## 2020-01-09 RX ORDER — ANASTROZOLE 1 MG/1
1 TABLET ORAL DAILY
Status: DISCONTINUED | OUTPATIENT
Start: 2020-01-09 | End: 2020-01-14 | Stop reason: HOSPADM

## 2020-01-09 RX ORDER — OXYCODONE HYDROCHLORIDE 10 MG/1
10 TABLET ORAL EVERY 4 HOURS PRN
Status: ON HOLD | COMMUNITY
End: 2020-01-14 | Stop reason: SDUPTHER

## 2020-01-09 RX ORDER — LEVOFLOXACIN 25 MG/ML
750 SOLUTION ORAL DAILY
COMMUNITY
End: 2020-01-14 | Stop reason: HOSPADM

## 2020-01-09 RX ORDER — IPRATROPIUM BROMIDE AND ALBUTEROL SULFATE 2.5; .5 MG/3ML; MG/3ML
3 SOLUTION RESPIRATORY (INHALATION) EVERY 4 HOURS PRN
Status: DISCONTINUED | OUTPATIENT
Start: 2020-01-09 | End: 2020-01-14 | Stop reason: HOSPADM

## 2020-01-09 RX ORDER — FENTANYL 50 UG/H
1 PATCH TRANSDERMAL
Status: ON HOLD | COMMUNITY
End: 2020-01-14 | Stop reason: SDUPTHER

## 2020-01-09 RX ORDER — GABAPENTIN 600 MG/1
600 TABLET ORAL 3 TIMES DAILY
COMMUNITY

## 2020-01-09 RX ORDER — ACETYLCYSTEINE 200 MG/ML
2 SOLUTION ORAL; RESPIRATORY (INHALATION) 2 TIMES DAILY
Status: DISCONTINUED | OUTPATIENT
Start: 2020-01-09 | End: 2020-01-14 | Stop reason: HOSPADM

## 2020-01-09 RX ORDER — METOCLOPRAMIDE HYDROCHLORIDE 5 MG/ML
5 INJECTION INTRAMUSCULAR; INTRAVENOUS
Status: DISCONTINUED | OUTPATIENT
Start: 2020-01-09 | End: 2020-01-14 | Stop reason: HOSPADM

## 2020-01-09 RX ORDER — SODIUM CHLORIDE 9 MG/ML
75 INJECTION, SOLUTION INTRAVENOUS CONTINUOUS
Status: DISCONTINUED | OUTPATIENT
Start: 2020-01-09 | End: 2020-01-14 | Stop reason: HOSPADM

## 2020-01-09 RX ORDER — SACCHAROMYCES BOULARDII 250 MG
250 CAPSULE ORAL DAILY
Status: DISCONTINUED | OUTPATIENT
Start: 2020-01-09 | End: 2020-01-14 | Stop reason: HOSPADM

## 2020-01-09 RX ORDER — LISINOPRIL 20 MG/1
40 TABLET ORAL EVERY MORNING
Status: DISCONTINUED | OUTPATIENT
Start: 2020-01-09 | End: 2020-01-14 | Stop reason: HOSPADM

## 2020-01-09 RX ORDER — AMLODIPINE BESYLATE 5 MG/1
5 TABLET ORAL EVERY MORNING
Status: DISCONTINUED | OUTPATIENT
Start: 2020-01-09 | End: 2020-01-14 | Stop reason: HOSPADM

## 2020-01-09 RX ORDER — PANTOPRAZOLE SODIUM 40 MG/1
40 TABLET, DELAYED RELEASE ORAL
Status: DISCONTINUED | OUTPATIENT
Start: 2020-01-09 | End: 2020-01-14

## 2020-01-09 RX ORDER — FENTANYL 50 UG/H
1 PATCH TRANSDERMAL
Status: DISCONTINUED | OUTPATIENT
Start: 2020-01-09 | End: 2020-01-14 | Stop reason: HOSPADM

## 2020-01-09 RX ORDER — OXYCODONE HYDROCHLORIDE 5 MG/1
10 TABLET ORAL EVERY 4 HOURS PRN
Status: DISCONTINUED | OUTPATIENT
Start: 2020-01-09 | End: 2020-01-14 | Stop reason: HOSPADM

## 2020-01-09 RX ORDER — FAMOTIDINE 20 MG/1
40 TABLET, FILM COATED ORAL 2 TIMES DAILY
COMMUNITY
End: 2020-01-14 | Stop reason: HOSPADM

## 2020-01-09 RX ADMIN — OXYCODONE HYDROCHLORIDE 10 MG: 5 TABLET ORAL at 02:48

## 2020-01-09 RX ADMIN — GABAPENTIN 600 MG: 300 CAPSULE ORAL at 21:49

## 2020-01-09 RX ADMIN — GABAPENTIN 600 MG: 300 CAPSULE ORAL at 06:00

## 2020-01-09 RX ADMIN — OXYCODONE HYDROCHLORIDE 10 MG: 5 TABLET ORAL at 14:42

## 2020-01-09 RX ADMIN — ANASTROZOLE 1 MG: 1 TABLET ORAL at 09:14

## 2020-01-09 RX ADMIN — METOCLOPRAMIDE 5 MG: 5 INJECTION, SOLUTION INTRAMUSCULAR; INTRAVENOUS at 13:28

## 2020-01-09 RX ADMIN — Medication 250 MG: at 09:14

## 2020-01-09 RX ADMIN — LEVOTHYROXINE SODIUM 175 MCG: 175 TABLET ORAL at 06:00

## 2020-01-09 RX ADMIN — LISINOPRIL 40 MG: 20 TABLET ORAL at 06:01

## 2020-01-09 RX ADMIN — OXYCODONE HYDROCHLORIDE 10 MG: 5 TABLET ORAL at 21:51

## 2020-01-09 RX ADMIN — PANTOPRAZOLE SODIUM 40 MG: 40 TABLET, DELAYED RELEASE ORAL at 06:00

## 2020-01-09 RX ADMIN — METOCLOPRAMIDE 5 MG: 5 INJECTION, SOLUTION INTRAMUSCULAR; INTRAVENOUS at 21:50

## 2020-01-09 RX ADMIN — METOCLOPRAMIDE 5 MG: 5 INJECTION, SOLUTION INTRAMUSCULAR; INTRAVENOUS at 17:12

## 2020-01-09 RX ADMIN — IPRATROPIUM BROMIDE AND ALBUTEROL SULFATE 3 ML: .5; 3 SOLUTION RESPIRATORY (INHALATION) at 18:39

## 2020-01-09 RX ADMIN — Medication 10 ML: at 21:51

## 2020-01-09 RX ADMIN — ENOXAPARIN SODIUM 40 MG: 40 INJECTION SUBCUTANEOUS at 17:12

## 2020-01-09 RX ADMIN — LORAZEPAM 0.5 MG: 0.5 TABLET ORAL at 02:49

## 2020-01-09 RX ADMIN — METOCLOPRAMIDE 5 MG: 5 INJECTION, SOLUTION INTRAMUSCULAR; INTRAVENOUS at 09:14

## 2020-01-09 RX ADMIN — LORAZEPAM 0.5 MG: 0.5 TABLET ORAL at 21:51

## 2020-01-09 RX ADMIN — AZITHROMYCIN MONOHYDRATE 500 MG: 500 INJECTION, POWDER, LYOPHILIZED, FOR SOLUTION INTRAVENOUS at 00:51

## 2020-01-09 RX ADMIN — IPRATROPIUM BROMIDE AND ALBUTEROL SULFATE 3 ML: .5; 3 SOLUTION RESPIRATORY (INHALATION) at 08:15

## 2020-01-09 RX ADMIN — CEFTRIAXONE SODIUM 2 G: 10 INJECTION, POWDER, FOR SOLUTION INTRAVENOUS at 00:51

## 2020-01-09 RX ADMIN — SODIUM CHLORIDE 75 ML/HR: 900 INJECTION, SOLUTION INTRAVENOUS at 03:59

## 2020-01-09 RX ADMIN — AMLODIPINE BESYLATE 5 MG: 5 TABLET ORAL at 06:00

## 2020-01-09 RX ADMIN — ACETYLCYSTEINE 4 ML: 200 SOLUTION ORAL; RESPIRATORY (INHALATION) at 18:39

## 2020-01-09 RX ADMIN — FENTANYL 1 PATCH: 50 PATCH, EXTENDED RELEASE TRANSDERMAL at 09:14

## 2020-01-09 RX ADMIN — ACETYLCYSTEINE 2 ML: 200 SOLUTION ORAL; RESPIRATORY (INHALATION) at 08:15

## 2020-01-09 RX ADMIN — Medication 10 ML: at 09:14

## 2020-01-09 RX ADMIN — GABAPENTIN 600 MG: 300 CAPSULE ORAL at 13:27

## 2020-01-09 NOTE — NURSING NOTE
Pt.'s daughter stated she had been giving her mother about 3 water bottles of free water (500 mls each). Daughter stated they took it down to about 2 1/2 water bottles of free water.

## 2020-01-09 NOTE — H&P
[facility] Medicine Services      Patient Name: Elma Sheikh  : 1950  MRN: [id number]  Primary Care Physician: Lorenza Parham MD  Date of admission: 2020    Patient Care Team:  Lorenza Parham MD as PCP - General  Lorenza Parham MD as PCP - Family Medicine          Subjective   History Present Illness     Chief Complaint:   Chief Complaint   Patient presents with   • Altered Mental Status       Ms. Sheikh is a 69 y.o.  presents to [facility] complaining of shortness of breath.           69-year-old female presents to the ER with a chief complaint of shortness of breath which is been worsening over the last 48 hours.  At home the patient's oxygen saturation dropped to 70% at which time she was brought to the emergency room for evaluation.  The patient reports intermittent small-volume vomiting without associated nausea over the last 3 to 4 days.  She gets tube feeds per G-tube.  She has a achy ostomy tube.  The patient has a history of oral cancer with bilateral radical neck dissection and lymph node removal in 2019.  She just finished chemotherapy on the right for a mass that developed after the surgery.  Patient is also been told that she has a small lung mass of unknown occasion.  Patient is on Keytruda immunotherapy every 3 weeks, but no other chemotherapy at this time.  The patient was diagnosed with oral cancer several years ago with tongue removal and prosthesis.  Her cancer continued to spread into the neck and she went to [facility] with Dr. Sousa in 2019 with a radical surgery.  The patient also has a history of breast cancer in the remote past.            Review of Systems   Constitution: Negative for chills, decreased appetite, fever and weight loss.   HENT: Positive for congestion, ear pain and sore throat.    Gastrointestinal: Negative for abdominal pain, constipation and nausea.   Genitourinary: Negative for  dysuria.   All other systems reviewed and are negative.          Personal History     Past Medical History:   Past Medical History:   Diagnosis Date   • Anesthesia complication     developed myalgia after anesthesia 6/2017 then after that anesthesia aware and hadn't had any problems  tete's 4/18/18 note states what anesthesia she got and awaiting [place] anesthesia report. notified anesthea dept at [facility] and they wanted all notes    • Aortic stenosis 6/17/2019   • Arthritis    • Breast cancer (CMS/HCC)     right breast had chemo & radiation    • Cancer (CMS/HCC)     right breast cancer   • Disease of thyroid gland    • GERD (gastroesophageal reflux disease)     rarely   • Heart murmur    • Hypertension    • Hypertension 6/17/2019   • Hypothyroidism    • Port-A-Cath in place     left side chest       Surgical History:      Past Surgical History:   Procedure Laterality Date   • BREAST LUMPECTOMY WITH AXILLARY NODE DISSECTION Right    • BREAST SURGERY      biopsy   • CARDIAC CATHETERIZATION     • CATARACT EXTRACTION Bilateral    • COLONOSCOPY     • ENDOSCOPY     • HEAD/NECK LESION/CYST EXCISION Right 6/14/2018    Procedure: RT. NECK INCISION AND DRAINAGE;  Surgeon: Toan Reed MD;  Location: [facility] OR;  Service: ENT   • HYSTERECTOMY       Includes complete hysterectomy secondary to fibroid tumor.    • NECK DISSECTION Right 5/31/2018    Procedure: RIGHT MODIFIED NECK DISSECTION;  Surgeon: Toan Reed MD;  [place] [facility] [place] [facility] [place]: E[place]   • TONGUE LESION EXCISION/BIOPSY Right     x2   • VENOUS ACCESS DEVICE (PORT) INSERTION      left           Family History: family history includes Breast cancer in an other family member. Otherwise pertinent FHx was reviewed and unremarkable.     Social History:  reports that she has never smoked. She has never used smokeless tobacco. She reports that she does not drink alcohol or use drugs.      Medications:  Prior to Admission medications       Medication Sig Start Date End Date Taking? Authorizing Provider   famotidine (PEPCID) 20 MG tablet Take 40 mg by mouth 2 (Two) Times a Day.   Yes Oralia Garcia MD   fentaNYL (DURAGESIC) 50 MCG/HR patch Place 1 patch on the skin as directed by provider Every 72 (Seventy-Two) Hours.   Yes Oralia Garcia MD   gabapentin (NEURONTIN) 600 MG tablet Take 600 mg by mouth 3 (Three) Times a Day.   Yes Oralia Garcia MD   levoFLOXacin (LEVAQUIN) 25 MG/ML solution Take 750 mg by mouth Daily.   Yes Oralia Garcia MD   oxyCODONE (ROXICODONE) 10 MG tablet Take 10 mg by mouth Every 4 (Four) Hours As Needed for Moderate Pain .   Yes Oralia Garcia MD   acetylcysteine (MUCOMYST) 20 % nebulizer solution Take 2 mL by nebulization 2 (Two) Times a Day. 6/18/19   Ab Almanzar, DO   amLODIPine (NORVASC) 5 MG tablet Take 5 mg by mouth Every Morning.    Oralia Garcia MD   anastrozole (ARIMIDEX) 1 MG tablet Take 1 mg by mouth Daily. 5/11/19   Oralia Garcia MD   ipratropium-albuterol (DUO-NEB) 0.5-2.5 mg/3 ml nebulizer Take 3 mL by nebulization Every 4 (Four) Hours As Needed for Wheezing or Shortness of Air. 6/18/19   Ab Almanzar, DO   lisinopril (PRINIVIL,ZESTRIL) 40 MG tablet Take 40 mg by mouth Every Morning.    Oralia Garcia MD   LORazepam (ATIVAN) 0.5 MG tablet Take 0.5 mg by mouth Every 6 (Six) Hours As Needed. for anxiety 5/11/19   Oralia Garcia MD   Probiotic Product (PROBIOTIC DAILY PO) Take 1 capsule by mouth Daily.    Oralia Garcia MD   SYNTHROID 175 MCG tablet Take 175 mcg by mouth Daily. 5/28/19   Oralia Garcia MD   cefdinir (OMNICEF) 250 MG/5ML suspension Take 5 mL by mouth 2 (Two) Times a Day. Use through PEG tube 6/18/19 1/9/20  Ab Almanzar, DO   guaifenesin-codeine (GUAIFENESIN AC) 100-10 MG/5ML liquid 10 mL by Per G Tube route Every 4 (Four) Hours. 6/18/19 1/9/20  Ab Almanzar,    omeprazole (priLOSEC) 40 MG capsule Take 40 mg by  mouth Daily. 5/22/19 1/9/20  Provider, MD Oralia   oxyCODONE (ROXICODONE) 10 MG tablet Take 10 mg by mouth Every 4 (Four) Hours As Needed. 5/11/19 1/9/20  Provider, MD Oralia       Allergies:    Allergies   Allergen Reactions   • Sulfa Antibiotics Swelling     Face, neck, trouble swallowing   • Succinylcholine Other (See Comments)     extreme muscle weakness        Objective   Objective     Vital Signs  Temp:  [98.8 °F (37.1 °C)] 98.8 °F (37.1 °C)  Heart Rate:  [] 87  Resp:  [20] 20  BP: (112-144)/(46-66) 112/46  SpO2:  [87 %-97 %] 97 %  on  Flow (L/min):  [10] 10;   Device (Oxygen Therapy): tracheostomy collar  Body mass index is 24.33 kg/m².    Physical Exam   Constitutional: She is oriented to person, place, and time. She appears well-developed and well-nourished. No distress.   HENT:   Mouth/Throat: Oropharyngeal exudate present.   The patient has a reconstructed tongue, she has a trach and is unable to talk but is able to nod appropriately and consistently.  The neck has guarding bilaterally from prior lateral radical neck and open area on the right   Eyes: Pupils are equal, round, and reactive to light. Conjunctivae and EOM are normal. Right eye exhibits no discharge. Left eye exhibits no discharge. No scleral icterus.   Neck:       Surgical site with crusted lesion, with   Blood cultures pending scant serosanguineous drainage, without signs of overt infection but the area is open and crusted   Cardiovascular: Normal rate, regular rhythm and intact distal pulses.   Murmur heard.  Grade 3/6 systolic murmur   Pulmonary/Chest: Effort normal and breath sounds normal. No respiratory distress. She has no wheezes. She exhibits no tenderness.   Tracheostomy   Abdominal: Soft. Bowel sounds are normal. She exhibits no distension.   Musculoskeletal: Normal range of motion. She exhibits no edema.   Neurological: She is alert and oriented to person, place, and time. A cranial nerve deficit is present.    cranial nerve deficit secondary to surgery   Skin: Skin is warm. Capillary refill takes less than 2 seconds. She is not diaphoretic.   Psychiatric: She has a normal mood and affect. Her behavior is normal. Judgment and thought content normal.   Vitals reviewed.      Results Review:  I have personally reviewed most recent lab results and radiology images and interpretations and agree with findings, most notably: Pneumonia, hyponatremia.    Results from last 7 days   Lab Units 01/08/20  2354   WBC 10*3/mm3 11.80*   HEMOGLOBIN g/dL 9.0*   HEMATOCRIT % 27.1*   PLATELETS 10*3/mm3 403     Results from last 7 days   Lab Units 01/08/20  2354   SODIUM mmol/L 126*   POTASSIUM mmol/L 4.8   CHLORIDE mmol/L 83*   CO2 mmol/L 32.0*   BUN mg/dL 19   CREATININE mg/dL 0.46*   GLUCOSE mg/dL 144*   CALCIUM mg/dL 8.9     Estimated Creatinine Clearance: 69.3 mL/min (A) (by C-G formula based on SCr of 0.46 mg/dL (L)).  Brief Urine Lab Results  (Last result in the past 365 days)      Color   Clarity   Blood   Leuk Est   Nitrite   Protein   CREAT   Urine HCG        06/16/19 1938 Yellow Clear Negative Negative Negative Negative               Microbiology Results (last 10 days)     ** No results found for the last 240 hours. **          ECG/EMG Results (most recent)     None           Chest x-ray reviewed    No radiology results for the last 7 days      Estimated Creatinine Clearance: 69.3 mL/min (A) (by C-G formula based on SCr of 0.46 mg/dL (L)).    Assessment/Plan   Assessment/Plan       Active Hospital Problems    Diagnosis  POA   • **Dyspnea [R06.00]  Yes     Priority: High   • Hypertension [I10]  Yes     Priority: Medium   • Hypothyroidism [E03.9]  Yes     Priority: Low   • Pneumonia due to infectious organism [J18.9]  Yes   • History of right breast cancer [Z85.3]  Not Applicable   • Chronic GERD [K21.9]  Yes   • Neck abscess [L02.11]  Yes   • Tongue cancer (CMS/HCC) [C02.9]  Yes      Resolved Hospital Problems   No resolved problems  to display.       Active Hospital Problems:  No notes have been filed under this hospital service.  Service: Hospitalist    Assessment and plan:  Acute shortness of breath with significant improvement on trach collar--likely secondary to pneumonia with concern for aspiration pneumonia: Oxygen support    Pneumonia, community-acquired with consideration for aspiration pneumonia; tracheostomy tube: Continue azithromycin, Rocephin; continue Mucomyst; hold Levaquin; continue probiotics    Leukocytosis, mild, WBC 11.8--likely secondary to pneumonia: Repeat CBC    Anemia, hemoglobin 9.0: Repeat CBC    Intermittent vomiting, without associated nausea, uncertain etiology: Nutrition consult to evaluate tube feeding; add Reglan 5 mg IV 4 times daily    --Note sodium 126    Hyponatremia, moderate, sodium 126--likely secondary to free water with tube feeding: Decrease free water; gentle IV fluids normal saline; repeat BMP    Chronic pain--secondary to surgery and neck cancer: Continue Roxicodone; continue Duragesic patch, continue Neurontin    Essential hypertension, chronic: Continue amlodipine, lisinopril    Hormone suppression therapy: Continue Arimidex    Gastric reflux: continue pepcid     Anxiety, chronic: continue ativan    Hypothyroidism, chronic: Check TSH; continue Synthroid    VTE Prophylaxis - Lovenox 40 mg SC daily.    CODE STATUS:    There are no questions and answers to display.       Admission Status:  I believe this patient meets inpatient criteria.      I discussed the patient's findings and my recommendations with patient and family       Electronically signed by DEBORA Gonzales, 01/09/20, 1:55 AM.  [facility] Hospitalist Team      Attending attestation:  The patient is a 69-year-old female with a history of tongue cancer status post resection and then recurrence in the neck status post bilateral neck dissections and trach and G-tube placement who has been undergoing radiation to her right neck over the  last week for recurrence.  Apparently each time she has had this these radiation treatments she had significant increase in secretions with hypoxia, and she ended up in the hospital last time as well.    Physical exam: Thin elderly female sitting up in bed on a trach tent in no acute distress; right neck with a large scabbed area with no drainage; lungs with scattered coarse rhonchi but no wheezing bilaterally; CV regular rate and rhythm; abdomen soft and nontender; extremities with no edema or calf tenderness.    Impression:   Right lower lobe pneumonia  -Continue Zithromax and Rocephin and monitor cultures.    Tongue cancer status post resection, status post recurrence, status post bilateral neck resections and trach and feeding tube placement; status post radiation, chemo and immunotherapy.  Patient just finished a 5-day course of radiation treatment to the right neck on 1/8/2020.    Patient was seen and examined and chart reviewed.  I agree with the assessment and plan of the ARNP.  Viola Nguyen MD

## 2020-01-09 NOTE — PLAN OF CARE
Problem: Patient Care Overview  Goal: Plan of Care Review  Outcome: Ongoing (interventions implemented as appropriate)  Flowsheets (Taken 1/9/2020 1641)  Progress: no change  Plan of Care Reviewed With: patient; daughter  Outcome Summary: Patient without complaints of SOA on shift. Remains on 10L trach collar. 84% on room air. Tube feeds restarted per dietary. Complained of pain once throughout shift- see MAR. trach care provided. Waiting on MD to round. No compaints at this time. Will continue to montior.

## 2020-01-09 NOTE — PROGRESS NOTES
Discharge Planning Assessment  [facility]     Patient Name: Elma Sheikh  MRN: [id number]  Today's Date: 1/9/2020    Admit Date: 1/8/2020    Discharge Needs Assessment     Row Name 01/09/20 0953       Living Environment    Lives With  child(adrianna), adult    Name(s) of Who Lives With Patient  daughter    Current Living Arrangements  home/apartment/condo    Primary Care Provided by  child(adrianna)    Provides Primary Care For  no one, unable/limited ability to care for self    Family Caregiver if Needed  child(adrianna), adult    Quality of Family Relationships  helpful;supportive    Able to Return to Prior Arrangements  yes       Resource/Environmental Concerns    Resource/Environmental Concerns  none    Transportation Concerns  car, none       Transition Planning    Patient/Family Anticipates Transition to  home with family    Transportation Anticipated  family or friend will provide       Discharge Needs Assessment    Readmission Within the Last 30 Days  no previous admission in last 30 days    Concerns to be Addressed  denies needs/concerns at this time    Equipment Currently Used at Home  trach supplies;feeding device;walker, standard;suction equipment;nebulizer    Equipment Needed After Discharge  none    Outpatient/Agency/Support Group Needs  homecare agency    Discharge Facility/Level of Care Needs  home with home health    Provided post acute provider list?  Refused    Patient's Choice of Community Agency(s)  Current with [facility]        Discharge Plan     Row Name 01/09/20 0955       Plan    Plan  D/C Plan: Home with family. Current with [facility] (MIS order entered and verified with Elham thomas) and [facility] for tube feeds. Trach supplies with [facility]. Watch for home O2 needs.     Plan Comments  Spoke with patient and patient's daughter at bedside. Patient is current with [facility] (MIS order entered and verified with Elham thomas). Current for tube feeds with [facility]. Patient's daughter is  primary caregiver and drives patient to appointments. Patient does not have home O2-watch for O2 needs. Trach supplies with [facility]. Barrier to D/C: trach collar at 10L, IVF, IV abx, blood cultures pending.             Home Medical Care      Service Provider Request Status Selected Services Address Phone Number Fax Number    [facility] Pending - Request Sent N/A [address] [place] [address] [phone]          Demographic Summary     Row Name 01/09/20 0952       General Information    Admission Type  inpatient    Arrived From  emergency department    Referral Source  admission list    Reason for Consult  discharge planning    Preferred Language  English     Used During This Interaction  no        Functional Status     Row Name 01/09/20 0952       Functional Status    Usual Activity Tolerance  fair    Current Activity Tolerance  fair       Functional Status, IADL    Medications  assistive person    Meal Preparation  assistive person    Housekeeping  assistive person    Laundry  assistive person    Shopping  assistive person       Mental Status    General Appearance WDL  WDL       Mental Status Summary    Recent Changes in Mental Status/Cognitive Functioning  no changes        Petrona Durán

## 2020-01-09 NOTE — CONSULTS
"Nutrition Services    Patient Name:  Elma Sheikh  YOB: 1950  MRN: [id number]  Admit Date:  1/8/2020    Comments:     TF orders: Isosource 1.5 @ 60 ml/hr + 10 ml q 4 hrs water flush.     Reason for Assessment                Reason for Assessment    Reason For Assessment 1/9: Tube Feeding Consult        Diagnosis H&P:  At time of assessment only ED note available. Per old RD note: head & neck CA s/p glossectomy c reconstruction & radical neck dissection, s/p trach and PEG, HTN, breast CA c chemo/radiation, GERD    Current Problems:     Per ED note admit r/t dyspnea, hypoxia, and PNA          Nutrition/Diet History                Nutrition/Diet History    Typical Food/Fluid Intake    Visited pt this morning with pt's daughter/care taker present. Pt sleeping & unable to complete interview.    Daughter reports pt received her PEG (Feb 2019) after having her tongue/teeth completely removed & a neck dissection following tx for her cancer. During this time daughter reports the pt only lost about 5-7#. Post PEG pt has been maintaining her weight around 140# & has \"maybe even gained a little\". Pt is NPO & uses TF exclusively for nutrition.     Of note, pt had her tongue reconstructed using muscles from her thigh at [facility]. She is able to move it some, but still has other pending sx procedures to help with tongue function.     Pt has a new neck mass (which was very visible at visit). She is currently undergoing 5 total radiation txs & immunotherapy.    Acutely, pt has been experiencing N/V with there tube feedings.  Daughter has noticed with radiation increased secretions which may be contributing a factor. Also she feels the N/V may be a side effect of the immunotherapy.     Pt's home TF regimen: 5 cans of Isosource 1.5 + 500 ml of water 5x/day which provides 1875 kcals, 85 gm protein, and 3455 ml total water (955 ml free from TF + 2500 ml from flushes).In addition, she has been giving pt an " "electrolyte replacement solution recommended by her son \"who works at a gym\". --> Noted this is a large amount of water which could be contributing to her hyponatremia. However the hyponatremia could be from N/V.       Functional Status Normally patient is still independently ambulatory. Her daughter is her full time caregiver.        Food Allergies  NKFA        Factors Affecting Nutritional Intake  Head/Neck cancer, N/V          Anthropometrics             Anthropometrics    Height 64 inches      Weight 64.3 kg (141 lb 12.1 oz)    (1/9/20)        Admit Weight    Admit Weight 64.3 kg (141 lb 12.1 oz)    (1/8/20)        Ideal Body Weight (IBW)    Ideal Body Weight (IBW) 120 lb     % Ideal Body Weight 118%        Usual Body Weight (UBW)    Usual Body Weight 140# per pt's daughter     % Usual Body Weight 101%     Weight Hx  125# (6/16/19)  134# (8/23/18)  130# (6/14/18)  137# (1/19/18)  139# (12/5/17)  140# (12/2/16)       Body Mass Index (BMI)    BMI (kg/m2) 24.33            Labs/Medications                Labs    Pertinent Lab Results Comments Gluc 144 H, Na 126 L, K+ 4.8, Crt 0.46 L, BUN 19, Ca 8.9, Hgb 9.0 L, Hct 27.1 L         Medications    Pertinent Medications Comments Neurontin, synthroid, Reglan, protonix, ativan prn, oxycodone prn          Physical Findings                Physical Findings    Overall Physical Appearance 1/9: NFPE completed, no s/s of malnutrition. Pt still very well developed.        Edema  None documented per EMR        Gastrointestinal No BM documented x 1 day. At baseline daughter reports pt is having daily BMs, loose in consistency.        Tubes PEG        Oral/Mouth Cavity Noted NPO & uses TF to meet 100% of needs        Skin Right throat abcess          Estimated/Assessed Needs              Calorie Requirements     Height Used for Calorie Calculations  64 inches     Weight Used for Calorie Calculations 64.3 kg      Formula chosen for Calorie Calculations  30 kcals/kg      Estimated " Calorie Requirements (kcals/day) 1929 kcals              Protein Requirements    Weight Used For Protein Calculations 64.3 kg       Est Protein Requirement Amount (gms/kg) 1.5 gm/kg       Estimated Protein Requirements (gms/day) 96 gm          Fluid Requirements     Estimated Fluid Requirements (mL/day) 1929 ml (1 ml/kcal- adjust based on hydration status)            Fluid Deficit       Desired Sodium Level (mEq/L)      Desired Sodium Level (mEq/L)      Estimated Fluid Deficit Needs (L)           Nutrition Prescription Ordered                Nutrition Prescription PO    Current PO Diet  NPO     Supplement -        Nutrition Prescription EN    Enteral Route PEG      TF modular -     TF Delivery Method -     Current Ordered TF  -     Current Ordered Water flush  -     TF Observation  -        Nutrition Prescription TPN    TPN Route -     Current Ordered TPN Volume  -     Dextrose (gm/kcals)  -     Amino Acid (gm/kcals) -     Lipids (Concentration/ML/Frequency)  -         Evaluation of Received Nutrient/Fluid Intake                PO Evaluation    % PO Intake N/A r/t NPO         EN Evaluation    TF Changes -     TF Residual -     TF Tolerance      Average EN Delivered  -        TPN Evaluation    Total Number of Days on TPN  -           Clinical Course      Clinical Nutrition Course Details  -1/8 NPO since admit          Problem/Interventions:                     Nutrition Diagnoses Problem 1      Problem 1   Inadequate oral intakes related to head/neck cancer requiring total glossectomy as evidenced by pt is NPO and s/p PEG requiring EN to meet nutritional needs.      Nutrition Diagnoses Problem 2      Problem 2            Intervention Goal                Intervention Goal    General Pt will tolerate EN at goal          Nutrition Intervention                Nutrition Intervention    RD/Tech Action Starting EN continuously, hesitant to start intermittent r/t recent N/V. Daughter is okay with this.    Educated daughter  when she goes home that 1000 ml total of extra water would suffice for additional water (two 500 ml water bottles). Also to monitor urine color/odor to assess for s/s of overhydration vs dehydration.     Recommending discontinuing routine use of electrolyte solution as the tube feeding as 100% of RDAs for vitamins/minerals.          Nutrition Prescription                Nutrition Prescription PO      Diet  NPO     Supplement -         Nutrition Prescription EN    Enteral Prescription Isosource 1.5 @ 60 ml/hr x 22 hrs (allows time of for ADLs) + 10 ml q4hr water flush provides 1980 kcals (103%), 90 gm protein (94%), and 1063 ml total water (1003 ml free from TF and 60 ml flush).     Will adjust water flush based on hydration status.         Nutrition Prescription TPN    TPN Prescription -         Monitor/Evaluation                Monitor/Evaluation    Monitor Weights, TF tolerance/advancement, labs, BM and skin            Electronically signed by:  Fide South RD  01/09/20 9:03 AM

## 2020-01-09 NOTE — PLAN OF CARE
Problem: Patient Care Overview  Goal: Plan of Care Review  Outcome: Ongoing (interventions implemented as appropriate)  Flowsheets (Taken 1/9/2020 9610)  Progress: no change  Plan of Care Reviewed With: patient  Outcome Summary: Pt. came in with SOB and feeling somewhat confused. Pt. has a trach and trach collar for her o2's at 10 Liters but only getting 40%. Pt. has an abcess on right side of neck (picture taken). Pt. also has a feeding tube.

## 2020-01-09 NOTE — ED PROVIDER NOTES
Subjective   Patient is a 69-year-old female with head and neck cancer with a tracheostomy in place.  Family states her O2 saturation dropped into the 70s at home.  She has been vomiting recently.  She has had no recent cough fever diarrhea dysuria is weight loss or other complaint.  Patient is unable offer complaints due to her severe condition.          Review of Systems  Negative for headache earache sore throat fever chest pain abdominal pain [place] discharges weight loss or other associated complaints  Past Medical History:   Diagnosis Date   • Anesthesia complication     developed myalgia after anesthesia 6/2017 then after that anesthesia aware and hadn't had any problems  tete's 4/18/18 note states what anesthesia she got and awaiting [place] anesthesia report. notified anesthea dept at [facility] and they wanted all notes    • Aortic stenosis 6/17/2019   • Arthritis    • Breast cancer (CMS/HCC)     right breast had chemo & radiation    • Cancer (CMS/HCC)     right breast cancer   • Disease of thyroid gland    • GERD (gastroesophageal reflux disease)     rarely   • Heart murmur    • Hypertension    • Hypertension 6/17/2019   • Hypothyroidism    • Port-A-Cath in place     left side chest       Allergies   Allergen Reactions   • Sulfa Antibiotics Swelling     Face, neck, trouble swallowing   • Succinylcholine Other (See Comments)     extreme muscle weakness        Past Surgical History:   Procedure Laterality Date   • BREAST LUMPECTOMY WITH AXILLARY NODE DISSECTION Right    • BREAST SURGERY      biopsy   • CARDIAC CATHETERIZATION     • CATARACT EXTRACTION Bilateral    • COLONOSCOPY     • ENDOSCOPY     • HEAD/NECK LESION/CYST EXCISION Right 6/14/2018    Procedure: RT. NECK INCISION AND DRAINAGE;  Surgeon: Toan Reed MD;  Location: [facility]  Service: ENT   • HYSTERECTOMY       Includes complete hysterectomy secondary to fibroid tumor.    • NECK DISSECTION Right 5/31/2018    Procedure: RIGHT  MODIFIED NECK DISSECTION;  Surgeon: Toan Reed MD;  Location: [facility] OR [facility];  Service: ENT   • TONGUE LESION EXCISION/BIOPSY Right     x2   • VENOUS ACCESS DEVICE (PORT) INSERTION      left       Family History   Problem Relation Age of Onset   • Breast cancer Other         There are multiple family members on both sides of the family with breast cancer.   • Malig Hyperthermia Neg Hx        Social History     Socioeconomic History   • Marital status:      Spouse name: Not on file   • Number of children: Not on file   • Years of education: Not on file   • Highest education level: Not on file   Tobacco Use   • Smoking status: Never Smoker   • Smokeless tobacco: Never Used   Substance and Sexual Activity   • Alcohol use: No   • Drug use: No   • Sexual activity: Defer   Social History Narrative    The patient is .  She has one adult child.  She does not smoke, does not drink alcohol.  She works on assembly .           Objective   Physical Exam  HEENT exam shows TMs to be clear.  Oropharynx shows no erythema.  Neck shows patient have tracheostomy in place.  Lungs have rhonchi at the bases.  Heart has regular rhythm without murmur rub or gallop.  Abdomen soft nontender.  Extremity exam unremarkable.  Procedures           ED Course      Results for orders placed or performed during the hospital encounter of 01/08/20   Basic Metabolic Panel   Result Value Ref Range    Glucose 144 (H) 65 - 99 mg/dL    BUN 19 8 - 23 mg/dL    Creatinine 0.46 (L) 0.57 - 1.00 mg/dL    Sodium 126 (L) 136 - 145 mmol/L    Potassium 4.8 3.5 - 5.2 mmol/L    Chloride 83 (L) 98 - 107 mmol/L    CO2 32.0 (H) 22.0 - 29.0 mmol/L    Calcium 8.9 8.6 - 10.5 mg/dL    eGFR Non African Amer 135 >60 mL/min/1.73    BUN/Creatinine Ratio 41.3 (H) 7.0 - 25.0    Anion Gap 11.0 5.0 - 15.0 mmol/L   CBC Auto Differential   Result Value Ref Range    WBC 11.80 (H) 3.40 - 10.80 10*3/mm3    RBC 3.37 (L) 3.77 - 5.28 10*6/mm3    Hemoglobin  9.0 (L) 12.0 - 15.9 g/dL    Hematocrit 27.1 (L) 34.0 - 46.6 %    MCV 80.3 79.0 - 97.0 fL    MCH 26.6 26.6 - 33.0 pg    MCHC 33.2 31.5 - 35.7 g/dL    RDW 15.9 (H) 12.3 - 15.4 %    RDW-SD 45.1 37.0 - 54.0 fl    MPV 6.3 6.0 - 12.0 fL    Platelets 403 140 - 450 10*3/mm3   Blood Gas, Arterial   Result Value Ref Range    Site Right Radial     Néstor's Test Positive     pH, Arterial 7.495 (H) 7.350 - 7.450 pH units    pCO2, Arterial 43.4 35.0 - 48.0 mm Hg    pO2, Arterial 76.8 (L) 83.0 - 108.0 mm Hg    HCO3, Arterial 33.5 (H) 21.0 - 28.0 mmol/L    Base Excess, Arterial 9.3 (H) 0.0 - 3.0 mmol/L    O2 Saturation, Arterial 96.2 94.0 - 98.0 %    CO2 Content 34.8 (H) 22 - 29 mmol/L    Barometric Pressure for Blood Gas      Modality T Collar     FIO2 40 %    Hemodilution No    Scan Slide   Result Value Ref Range    Scan Slide     Manual Differential   Result Value Ref Range    Neutrophil % 78.0 (H) 42.7 - 76.0 %    Lymphocyte % 0.0 (L) 19.6 - 45.3 %    Monocyte % 5.0 5.0 - 12.0 %    Bands %  17.0 (H) 0.0 - 5.0 %    Neutrophils Absolute 11.21 (H) 1.70 - 7.00 10*3/mm3    Lymphocytes Absolute 0.00 (L) 0.70 - 3.10 10*3/mm3    Monocytes Absolute 0.59 0.10 - 0.90 10*3/mm3    Anisocytosis Slight/1+ None Seen    Toxic Granulation Slight/1+ None Seen    Vacuolated Neutrophils Slight/1+ None Seen    Giant Platelets Slight/1+ None Seen     No radiology results for the last day                                           MDM  Number of Diagnoses or Management Options  Diagnosis management comments: Patient had hypoxia initially on presentation to the ED.  Patient is placed on a trach collar and has a desaturation in the low 90s.  There is question of basal infiltrate on her chest x-ray.  She had blood cultures obtained and was given IV antibiotics.  Patient will be brought in the hospital for dyspnea pneumonia and hypoxia.  Did speak the on-call hospitalist.    Risk of Complications, Morbidity, and/or Mortality  Presenting problems:  high  Diagnostic procedures: high  Management options: high    Patient Progress  Patient progress: stable      Final diagnoses:   Dyspnea, unspecified type   Hypoxia   Pneumonia due to infectious organism, unspecified laterality, unspecified part of lung            Fabián Torres MD  01/09/20 0039

## 2020-01-09 NOTE — DISCHARGE PLACEMENT REQUEST
"Katja Mcmahon (69 y.o. Female)     Date of Birth Social Security Number Address Home Phone MRN    1950  [address]   [place] [address] [phone] [id number]    [employer] Marital Status          Catholic        Admission Date Admission Type Admitting Provider Attending Provider Department, Room/Bed    1/8/20 Emergency Viola Nguyen MD Hall, Kelli G, MD [facility] 2C MEDICAL INPATIENT, 244/1    Discharge Date Discharge Disposition Discharge Destination                       Attending Provider:  Viola Nguyen MD    Allergies:  Sulfa Antibiotics, Succinylcholine    Isolation:  None   Infection:  None   Code Status:  CPR    Ht:  162.6 cm (64\")   Wt:  64.3 kg (141 lb 12.1 oz)    Admission Cmt:  None   Principal Problem:  Dyspnea [R06.00]                 Active Insurance as of 1/8/2020     Primary Coverage     Payor Plan Insurance Group Employer/Plan Group    MEDICARE MEDICARE A & B      Payor Plan Address Payor Plan Phone Number Payor Plan Fax Number Effective Dates    [address] [phone]  11/1/2017 - None Entered    [facility] [id number]       Subscriber Name Subscriber Birth Date Member ID       KATJA MCMAHON 1950 [id number]           Secondary Coverage     Payor Plan Insurance Group Employer/Plan Group    AETNA COMMERCIAL AETNA  MC SUPP      Payor Plan Address Payor Plan Phone Number Payor Plan Fax Number Effective Dates    [address] [phone]  11/1/2017 - None Entered    [facility] [id number]       Subscriber Name Subscriber Birth Date Member ID       KATJA MCMAHON 1950 [id number]                 Emergency Contacts      (Rel.) Home Phone Work Phone Mobile Phone    Adrienne Rodriguez (Daughter) -- -- [phone]              "

## 2020-01-10 LAB
B PERT DNA SPEC QL NAA+PROBE: NOT DETECTED
C PNEUM DNA NPH QL NAA+NON-PROBE: NOT DETECTED
FLUAV H1 2009 PAND RNA NPH QL NAA+PROBE: NOT DETECTED
FLUAV H1 HA GENE NPH QL NAA+PROBE: NOT DETECTED
FLUAV H3 RNA NPH QL NAA+PROBE: NOT DETECTED
FLUAV SUBTYP SPEC NAA+PROBE: NOT DETECTED
FLUBV RNA ISLT QL NAA+PROBE: NOT DETECTED
HADV DNA SPEC NAA+PROBE: NOT DETECTED
HCOV 229E RNA SPEC QL NAA+PROBE: NOT DETECTED
HCOV HKU1 RNA SPEC QL NAA+PROBE: NOT DETECTED
HCOV NL63 RNA SPEC QL NAA+PROBE: NOT DETECTED
HCOV OC43 RNA SPEC QL NAA+PROBE: NOT DETECTED
HMPV RNA NPH QL NAA+NON-PROBE: NOT DETECTED
HPIV1 RNA SPEC QL NAA+PROBE: NOT DETECTED
HPIV2 RNA SPEC QL NAA+PROBE: NOT DETECTED
HPIV3 RNA NPH QL NAA+PROBE: NOT DETECTED
HPIV4 P GENE NPH QL NAA+PROBE: NOT DETECTED
M PNEUMO IGG SER IA-ACNC: NOT DETECTED
PROCALCITONIN SERPL-MCNC: 0.11 NG/ML (ref 0.1–0.25)
RHINOVIRUS RNA SPEC NAA+PROBE: NOT DETECTED
RSV RNA NPH QL NAA+NON-PROBE: NOT DETECTED

## 2020-01-10 PROCEDURE — 99233 SBSQ HOSP IP/OBS HIGH 50: CPT | Performed by: HOSPITALIST

## 2020-01-10 PROCEDURE — 25010000002 VANCOMYCIN 10 G RECONSTITUTED SOLUTION: Performed by: HOSPITALIST

## 2020-01-10 PROCEDURE — 25010000002 METOCLOPRAMIDE PER 10 MG: Performed by: NURSE PRACTITIONER

## 2020-01-10 PROCEDURE — 87899 AGENT NOS ASSAY W/OPTIC: CPT | Performed by: NURSE PRACTITIONER

## 2020-01-10 PROCEDURE — 25010000002 CEFEPIME PER 500 MG: Performed by: NURSE PRACTITIONER

## 2020-01-10 PROCEDURE — 25010000002 CEFTRIAXONE PER 250 MG: Performed by: HOSPITALIST

## 2020-01-10 PROCEDURE — 25010000002 AZITHROMYCIN PER 500 MG: Performed by: HOSPITALIST

## 2020-01-10 PROCEDURE — 0099U HC BIOFIRE FILMARRAY RESP PANEL 1: CPT | Performed by: NURSE PRACTITIONER

## 2020-01-10 PROCEDURE — 94799 UNLISTED PULMONARY SVC/PX: CPT

## 2020-01-10 PROCEDURE — 87205 SMEAR GRAM STAIN: CPT | Performed by: NURSE PRACTITIONER

## 2020-01-10 PROCEDURE — 87070 CULTURE OTHR SPECIMN AEROBIC: CPT | Performed by: NURSE PRACTITIONER

## 2020-01-10 PROCEDURE — 25010000002 ENOXAPARIN PER 10 MG: Performed by: NURSE PRACTITIONER

## 2020-01-10 RX ORDER — IPRATROPIUM BROMIDE AND ALBUTEROL SULFATE 2.5; .5 MG/3ML; MG/3ML
3 SOLUTION RESPIRATORY (INHALATION)
Status: DISCONTINUED | OUTPATIENT
Start: 2020-01-10 | End: 2020-01-14 | Stop reason: HOSPADM

## 2020-01-10 RX ORDER — GUAIFENESIN 600 MG/1
1200 TABLET, EXTENDED RELEASE ORAL EVERY 12 HOURS SCHEDULED
Status: DISCONTINUED | OUTPATIENT
Start: 2020-01-10 | End: 2020-01-10

## 2020-01-10 RX ADMIN — METOCLOPRAMIDE 5 MG: 5 INJECTION, SOLUTION INTRAMUSCULAR; INTRAVENOUS at 16:42

## 2020-01-10 RX ADMIN — IPRATROPIUM BROMIDE AND ALBUTEROL SULFATE 3 ML: .5; 3 SOLUTION RESPIRATORY (INHALATION) at 19:59

## 2020-01-10 RX ADMIN — ANASTROZOLE 1 MG: 1 TABLET ORAL at 09:43

## 2020-01-10 RX ADMIN — IPRATROPIUM BROMIDE AND ALBUTEROL SULFATE 3 ML: .5; 3 SOLUTION RESPIRATORY (INHALATION) at 15:15

## 2020-01-10 RX ADMIN — GABAPENTIN 600 MG: 300 CAPSULE ORAL at 21:17

## 2020-01-10 RX ADMIN — AZITHROMYCIN MONOHYDRATE 500 MG: 500 INJECTION, POWDER, LYOPHILIZED, FOR SOLUTION INTRAVENOUS at 02:33

## 2020-01-10 RX ADMIN — METOCLOPRAMIDE 5 MG: 5 INJECTION, SOLUTION INTRAMUSCULAR; INTRAVENOUS at 09:44

## 2020-01-10 RX ADMIN — CEFTRIAXONE SODIUM 1 G: 1 INJECTION, POWDER, FOR SOLUTION INTRAMUSCULAR; INTRAVENOUS at 01:51

## 2020-01-10 RX ADMIN — AMLODIPINE BESYLATE 5 MG: 5 TABLET ORAL at 09:14

## 2020-01-10 RX ADMIN — ACETYLCYSTEINE 2 ML: 200 SOLUTION ORAL; RESPIRATORY (INHALATION) at 19:59

## 2020-01-10 RX ADMIN — ACETYLCYSTEINE 4 ML: 200 SOLUTION ORAL; RESPIRATORY (INHALATION) at 06:50

## 2020-01-10 RX ADMIN — Medication 10 ML: at 21:17

## 2020-01-10 RX ADMIN — PANTOPRAZOLE SODIUM 40 MG: 40 TABLET, DELAYED RELEASE ORAL at 05:05

## 2020-01-10 RX ADMIN — GUAIFENESIN 600 MG: 100 SOLUTION ORAL at 16:03

## 2020-01-10 RX ADMIN — OXYCODONE HYDROCHLORIDE 10 MG: 5 TABLET ORAL at 21:55

## 2020-01-10 RX ADMIN — Medication 250 MG: at 09:43

## 2020-01-10 RX ADMIN — GUAIFENESIN 600 MG: 100 SOLUTION ORAL at 21:17

## 2020-01-10 RX ADMIN — METOCLOPRAMIDE 5 MG: 5 INJECTION, SOLUTION INTRAMUSCULAR; INTRAVENOUS at 21:17

## 2020-01-10 RX ADMIN — IPRATROPIUM BROMIDE AND ALBUTEROL SULFATE 3 ML: .5; 3 SOLUTION RESPIRATORY (INHALATION) at 06:50

## 2020-01-10 RX ADMIN — LISINOPRIL 40 MG: 20 TABLET ORAL at 09:44

## 2020-01-10 RX ADMIN — CEFEPIME 2 G: 2 INJECTION, POWDER, FOR SOLUTION INTRAVENOUS at 15:00

## 2020-01-10 RX ADMIN — VANCOMYCIN HYDROCHLORIDE 1250 MG: 10 INJECTION, POWDER, LYOPHILIZED, FOR SOLUTION INTRAVENOUS at 19:55

## 2020-01-10 RX ADMIN — METOCLOPRAMIDE 5 MG: 5 INJECTION, SOLUTION INTRAMUSCULAR; INTRAVENOUS at 11:44

## 2020-01-10 RX ADMIN — ENOXAPARIN SODIUM 40 MG: 40 INJECTION SUBCUTANEOUS at 16:01

## 2020-01-10 RX ADMIN — GABAPENTIN 600 MG: 300 CAPSULE ORAL at 05:04

## 2020-01-10 RX ADMIN — CEFEPIME 2 G: 2 INJECTION, POWDER, FOR SOLUTION INTRAVENOUS at 21:47

## 2020-01-10 RX ADMIN — LEVOTHYROXINE SODIUM 175 MCG: 175 TABLET ORAL at 05:05

## 2020-01-10 RX ADMIN — GABAPENTIN 600 MG: 300 CAPSULE ORAL at 16:01

## 2020-01-10 NOTE — PROGRESS NOTES
"Pharmacy Antimicrobial Dosing Service    Subjective:  Elma Sheikh is a 69 y.o.female with HAP.    PMH includes: breast cancer, tongue cancer, s/p removal of tongue, tracheostomy, hypothyroidism, HTN      Assessment/Plan    1. Day #1 Vancomycin: Goal -600 mcg*h/mL and Goal trough 10-20 mcg/mL. 1250mg q18h (19.5mg/kg).  Levels prior to 5th dose as follows:  Peak 1/13 at 0200 and Trough 1/13 at 1500    2. Day #1 Cefepime: 2000mg IV q8h for estCrCl > 60 mL/min.    Will continue to monitor drug levels, renal function, culture and sensitivities, and patient clinical status.       Objective:  Relevant clinical data and objective history reviewed:  162.6 cm (64\")   64.3 kg (141 lb 12.1 oz)   Ideal body weight: 54.7 kg (120 lb 9.5 oz)  Adjusted ideal body weight: 58.5 kg (129 lb 0.9 oz)  Body mass index is 24.33 kg/m².        Results from last 7 days   Lab Units 01/08/20  2354   CREATININE mg/dL 0.46*     Estimated Creatinine Clearance: 69.3 mL/min (A) (by C-G formula based on SCr of 0.46 mg/dL (L)).  I/O last 3 completed shifts:  In: 2295.5 [Other:20; NG/GT:2275.5]  Out: 600 [Urine:600]    Results from last 7 days   Lab Units 01/08/20  2354   WBC 10*3/mm3 11.80*     Temperature    01/09/20 1949 01/10/20 0358 01/10/20 1150   Temp: 98.8 °F (37.1 °C) 99.8 °F (37.7 °C) 99.1 °F (37.3 °C)     Baseline culture/source/susceptibility:  Microbiology Results (last 10 days)       Procedure Component Value - Date/Time    Blood Culture - Blood, Blood, Venous Line [[id number]] Collected:  01/09/20 0050    Lab Status:  Preliminary result Specimen:  Blood, Venous Line Updated:  01/10/20 0100     Blood Culture No growth at 24 hours             Anti-Infectives (From admission, onward)      Ordered     Dose/Rate Route Frequency Start Stop    01/10/20 1509  !Vancomycin Level Draw Needed     Ordering Provider:  Viola Nguyen MD     Does not apply Once 01/13/20 1500      01/10/20 1509  !Vancomycin Level Draw Needed     Ordering " Provider:  Viola Nguyen MD     Does not apply Once 01/13/20 0200      01/10/20 1501  vancomycin 1250 mg/250 mL 0.9% NS IVPB ([facility])     Ordering Provider:  Viola Nguyen MD    1,250 mg  over 120 Minutes Intravenous Every 18 Hours 01/10/20 1600 01/21/20 0359    01/10/20 1320  cefepime 2 gm IVPB in 100 ml NS ([facility]BP)  Review   Ordering Provider:  Beverly Nunes APRN    2 g Intravenous Every 8 Hours 01/10/20 1400 01/17/20 1359    01/10/20 1320  Pharmacy to dose vancomycin     Ordering Provider:  Beverly Nunes APRN     Does not apply Continuous PRN 01/10/20 1320 01/17/20 1319    01/09/20 0029  azithromycin (ZITHROMAX) 500 mg in 250mL NS IVPB     Ordering Provider:  Fabián Torres MD    500 mg  over 60 Minutes Intravenous Once 01/09/20 0100 01/09/20 0151    01/09/20 0029  cefTRIAXone (ROCEPHIN) in SWFI 2 GRAMS/20ml IV PUSH syringe     Ordering Provider:  Fabián Torres MD    2 g  over 5 Minutes Intravenous Once 01/09/20 0031 01/09/20 0056            Alban Amado, PharmD  01/10/20 3:12 PM

## 2020-01-10 NOTE — NURSING NOTE
Patient seen today for right neck lesion. It is dry/scabbed/stable approximately 6x10cm in size.  No periwound erythema.There is scant bloody drainage where trache collar was stuck to lesion.  Unsure if this is a fungating lesion or burn/eschar from recent radiation.      Has hx of neck/tongue cancer and surgery to neck.  Patient is unable to speak and unsure if lesion was present before radiation. Just finished radiation to site a few days ago.  Family at bedside state they have not been treating the site with anything and that they leave it open to air. Concern if moisten with moist wound therapy will bleed.  Recommend treating conservatively with betadine paint and telfa pad bid.

## 2020-01-10 NOTE — PLAN OF CARE
Problem: Patient Care Overview  Goal: Plan of Care Review  Outcome: Ongoing (interventions implemented as appropriate)  Flowsheets (Taken 1/10/2020 0630)  Progress: no change  Plan of Care Reviewed With: patient; daughter  Outcome Summary: Patient needed to be suctioned 3 times during shift. Pt. was in no distress. Taking meds and feeds with no issues.

## 2020-01-10 NOTE — CONSULTS
Pulmonary/ Critical Care/ sleep medicine Consult Note        Patient Name:  Elma Sheikh    :  1950    Medical Record:  [id number]    Requesting Physician    Lorenza Parham MD    Primary Care Physician     Lorenza aPrham MD    Reason for consultation    Elma Sheikh is a 69 y.o. female who was referred for consultation for pneumonia    History of present illness  The patient is a 69-year-old who presented to the emergency department for evaluation of shortness of air and hypoxia.  The following information has been supplemented by review of documentation and interview with the patient's niece at the bedside as the patient is nonverbal with a tracheostomy in place making communication difficult.  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  She completed a 5-day cycle of chemotherapy on 2020 in [facility].  Over the past 2 days the patient has had progressive worsening of shortness of air.  She reports that she has had a minimal amount of clear and yellow expectoration, subjective fever and chills, emesis, diarrhea for the past 3 to 4 days.  She receives intermittent tube feeds via PEG and reports that the emesis looked like tube feeds and she did not have nausea prior to the events.  Her family reported that at home her room air saturation dropped to the 70s and she presented to the emergency department for further evaluation.    In the emergency department chest x-ray revealed right lower lobe filtrate consistent with pneumonia.  WBCs 11.80, lactate was not performed.  ABG on 40% trach collar: 7.4 9/43/76/30 3/96%.  The patient was admitted for further evaluation and treatment of pneumonia and hypoxic respiratory failure.    Review of Systems    as above    Medical History    Past Medical History:   Diagnosis Date   • Anesthesia  complication     developed myalgia after anesthesia 6/2017 then after that anesthesia aware and hadn't had any problems  tete's 4/18/18 note states what anesthesia she got and awaiting [place] anesthesia report. notified anesthea dept at [facility] and they wanted all notes    • Aortic stenosis 6/17/2019   • Arthritis    • Breast cancer (CMS/HCC)     right breast had chemo & radiation    • Cancer (CMS/HCC)     right breast cancer   • Disease of thyroid gland    • GERD (gastroesophageal reflux disease)     rarely   • Heart murmur    • Hypertension    • Hypertension 6/17/2019   • Hypothyroidism    • Port-A-Cath in place     left side chest        Surgical History    Past Surgical History:   Procedure Laterality Date   • BREAST LUMPECTOMY WITH AXILLARY NODE DISSECTION Right    • BREAST SURGERY      biopsy   • CARDIAC CATHETERIZATION     • CATARACT EXTRACTION Bilateral    • COLONOSCOPY     • ENDOSCOPY     • HEAD/NECK LESION/CYST EXCISION Right 6/14/2018    Procedure: RT. NECK INCISION AND DRAINAGE;  Surgeon: Toan eRed MD;  Location: [facility] OR;  Service: ENT   • HYSTERECTOMY       Includes complete hysterectomy secondary to fibroid tumor.    • NECK DISSECTION Right 5/31/2018    Procedure: RIGHT MODIFIED NECK DISSECTION;  Surgeon: Toan Reed MD;  Location: [facility];  Service: ENT   • TONGUE LESION EXCISION/BIOPSY Right     x2   • VENOUS ACCESS DEVICE (PORT) INSERTION      left        Family History    Family History   Problem Relation Age of Onset   • Breast cancer Other         There are multiple family members on both sides of the family with breast cancer.   • Malig Hyperthermia Neg Hx        Social History    Social History     Tobacco Use   • Smoking status: Never Smoker   • Smokeless tobacco: Never Used   Substance Use Topics   • Alcohol use: No        Allergies    Allergies   Allergen Reactions   • Sulfa Antibiotics Swelling     Face, neck, trouble swallowing   • Succinylcholine Other  (See Comments)     extreme muscle weakness        Medications    Scheduled Meds:  acetylcysteine 2 mL Nebulization BID   amLODIPine 5 mg Oral QAM   anastrozole 1 mg Oral Daily   azithromycin 500 mg Intravenous Q24H   cefTRIAXone 1 g Intravenous Q24H   enoxaparin 40 mg Subcutaneous Q24H   fentaNYL 1 patch Transdermal Q72H   gabapentin 600 mg Oral Q8H   levothyroxine 175 mcg Oral Q AM   lisinopril 40 mg Oral QAM   metoclopramide 5 mg Intravenous 4x Daily AC & at Bedtime   pantoprazole 40 mg Oral Q AM   saccharomyces boulardii 250 mg Oral Daily     Continuous Infusions:  sodium chloride 75 mL/hr Last Rate: 75 mL/hr (20 0359)     PRN Meds:.ipratropium-albuterol  •  LORazepam  •  oxyCODONE  •  [COMPLETED] Insert peripheral IV **AND** sodium chloride      Physical Exam    tMax 24 hrs:  Temp (24hrs), Av.2 °F (37.3 °C), Min:98.8 °F (37.1 °C), Max:99.8 °F (37.7 °C)    Vitals Ranges:  Temp:  [98.8 °F (37.1 °C)-99.8 °F (37.7 °C)] 99.1 °F (37.3 °C)  Heart Rate:  [79-94] 91  Resp:  [16-22] 18  BP: (105-113)/(65-69) 113/69  Intake and Output Last 3 Shifts:  I/O last 3 completed shifts:  In: 2295.5 [Other:20; NG/GT:2275.5]  Out: 600 [Urine:600]    Constitutional:  Well developed, well nourished, no acute distress, chronically ill-appearing  Eyes:  PERRL, conjunctiva normal   HENT:  Atraumatic, external ears normal, nose normal, tongue prosthesis in place  Neck-midline tracheostomy.  Right lateral neck wound, dry with no drainage  Respiratory:  No respiratory distress, occasional scattered rhonchi, coarse bases, no rales, no wheezing   Cardiovascular: Regular rate, systolic murmur, no gallops, no rubs   GI:  Soft, nondistended, normal bowel sounds, nontender, left upper quadrant feeding tube, no rebound, no guarding   : Deferred  Musculoskeletal:  No edema, no tenderness, no deformities  Integument:  Well hydrated, no rash.  Right neck wound  Neurologic:  Alert & appropriate.  Unable to establish orientation, patient is  nonverbal however she does seem to communicate appropriately.  No lateralizing deficits  Psychiatric: Calm    labs    Lab Results (last 24 hours)     Procedure Component Value Units Date/Time    Blood Culture - Blood, Blood, Venous Line [[id number]] Collected:  01/09/20 0050    Specimen:  Blood, Venous Line Updated:  01/10/20 0100     Blood Culture No growth at 24 hours    Magnesium [[id number]]  (Normal) Collected:  01/09/20 1416    Specimen:  Blood Updated:  01/09/20 1507     Magnesium 2.1 mg/dL     Phosphorus [[id number]]  (Normal) Collected:  01/09/20 1416    Specimen:  Blood Updated:  01/09/20 1507     Phosphorus 3.6 mg/dL           Imaging & Other Studies    Imaging Results (Last 72 Hours)     Procedure Component Value Units Date/Time    XR Chest 1 View [[id number]] Collected:  01/09/20 0804     Updated:  01/09/20 0808    Narrative:       DATE OF EXAM:  1/9/2020 12:11 AM     PROCEDURE:  XR CHEST 1 VW-     INDICATIONS:  Dyspnea     COMPARISON:  6/16/2019     TECHNIQUE:   Single radiographic view of the chest was obtained.     FINDINGS:  Tracheostomy tube is again noted. There is a left subclavian  Bruqun-v-Efnd catheter which appears unchanged. There is elevation of  the right hemidiaphragm again noted. There is increasing alveolar  opacity in the medial right lung base suggesting infiltrate. The lungs  are otherwise clear. Pulmonary vascularity appears within normal limits.  Heart size and mediastinal contour appear stable. There is degenerative  change in the spine. There is limited visualization of the right lung  apex due to overlying apparatus.       Impression:          1. Right medial basilar infiltrate. Correlate clinically for pneumonia.  Follow-up evaluation recommended to document resolution.     Electronically Signed By-Fabián Singleton On:1/9/2020 8:05 AM  This report was finalized on [id number] by  Fabián Singleton, .          Assessment    Healthcare acquired pneumonia  Acute hypoxemic respiratory failure  secondary to pneumonia  Vomiting, currently resolved  Hyponatremia  Oral cancer status post bilateral radical neck dissection and subsequent recurrence of right neck mass.  Status post radiation and chemotherapy  History of breast cancer  Valvular heart disease with known murmur      Plan    Broaden antibiotic coverage to cefepime and vancomycin  Sputum culture via tracheal suction pending  Procalcitonin pending  Urine antigens for Legionella and strep pneumo pending  Respiratory viral panel pending  Blood cultures pending  Add mucolytic  DuoNeb scheduled and PRN    Chronic conditions managed per primary        See orders. The plan was discussed with the patient and her family      I thank you for this opportunity to take part in this patient's care and will follow the patient along with you.

## 2020-01-10 NOTE — PROGRESS NOTES
Nutrition Services    Patient Name:  Elma Sheikh  YOB: 1950  MRN: [id number]  Admit Date:  1/8/2020    Progress note:    TF check on; documented at 40 mL/hr, residuals have been WNL, spoke with ROBERT Gamboa to confirm tube feed will be advanced to goal today (states around noon, it will be advanced), confirmed patient had been tolerating TF and correct tube feed order with water flush. Reviewed labs (see below).    Noted no new labs since last RDN assessment.      EN Prescription: Isosource 1.5 to goal 60 mL/hr with 10 mL [id number] flush.       Recommend continuing current tube feed regimen, RDN will continue to follow as planned to monitor TF tolerance and labs.      Results from last 7 days   Lab Units 01/09/20  1416 01/08/20  2354   SODIUM mmol/L  --  126*   POTASSIUM mmol/L  --  4.8   GLUCOSE mg/dL  --  144*   BUN mg/dL  --  19   CREATININE mg/dL  --  0.46*   CALCIUM mg/dL  --  8.9   MAGNESIUM mg/dL 2.1  --    PHOSPHORUS mg/dL 3.6  --    HEMOGLOBIN g/dL  --  9.0*   HEMATOCRIT %  --  27.1*         Electronically signed by:  Kathe Ferrell RD  01/10/20 11:16 AM

## 2020-01-11 LAB
ANION GAP SERPL CALCULATED.3IONS-SCNC: 11 MMOL/L (ref 5–15)
BASOPHILS # BLD AUTO: 0 10*3/MM3 (ref 0–0.2)
BASOPHILS NFR BLD AUTO: 0.2 % (ref 0–1.5)
BUN BLD-MCNC: 9 MG/DL (ref 8–23)
BUN/CREAT SERPL: 22.5 (ref 7–25)
CALCIUM SPEC-SCNC: 8.8 MG/DL (ref 8.6–10.5)
CHLORIDE SERPL-SCNC: 84 MMOL/L (ref 98–107)
CO2 SERPL-SCNC: 34 MMOL/L (ref 22–29)
CREAT BLD-MCNC: 0.4 MG/DL (ref 0.57–1)
D-LACTATE SERPL-SCNC: 1.8 MMOL/L (ref 0.5–2)
DEPRECATED RDW RBC AUTO: 47.3 FL (ref 37–54)
EOSINOPHIL # BLD AUTO: 0.1 10*3/MM3 (ref 0–0.4)
EOSINOPHIL NFR BLD AUTO: 1.1 % (ref 0.3–6.2)
ERYTHROCYTE [DISTWIDTH] IN BLOOD BY AUTOMATED COUNT: 16.3 % (ref 12.3–15.4)
GFR SERPL CREATININE-BSD FRML MDRD: >150 ML/MIN/1.73
GLUCOSE BLD-MCNC: 145 MG/DL (ref 65–99)
HCT VFR BLD AUTO: 29 % (ref 34–46.6)
HGB BLD-MCNC: 9.6 G/DL (ref 12–15.9)
L PNEUMO1 AG UR QL IA: NEGATIVE
LYMPHOCYTES # BLD AUTO: 0.8 10*3/MM3 (ref 0.7–3.1)
LYMPHOCYTES NFR BLD AUTO: 9 % (ref 19.6–45.3)
MAGNESIUM SERPL-MCNC: 1.9 MG/DL (ref 1.6–2.4)
MCH RBC QN AUTO: 26.9 PG (ref 26.6–33)
MCHC RBC AUTO-ENTMCNC: 33 G/DL (ref 31.5–35.7)
MCV RBC AUTO: 81.8 FL (ref 79–97)
MONOCYTES # BLD AUTO: 0.7 10*3/MM3 (ref 0.1–0.9)
MONOCYTES NFR BLD AUTO: 8.1 % (ref 5–12)
NEUTROPHILS # BLD AUTO: 7.5 10*3/MM3 (ref 1.7–7)
NEUTROPHILS NFR BLD AUTO: 81.6 % (ref 42.7–76)
NRBC BLD AUTO-RTO: 0 /100 WBC (ref 0–0.2)
PHOSPHATE SERPL-MCNC: 2.5 MG/DL (ref 2.5–4.5)
PLATELET # BLD AUTO: 437 10*3/MM3 (ref 140–450)
PMV BLD AUTO: 6.5 FL (ref 6–12)
POTASSIUM BLD-SCNC: 3.7 MMOL/L (ref 3.5–5.2)
RBC # BLD AUTO: 3.55 10*6/MM3 (ref 3.77–5.28)
S PNEUM AG SPEC QL LA: NEGATIVE
SODIUM BLD-SCNC: 129 MMOL/L (ref 136–145)
WBC NRBC COR # BLD: 9.2 10*3/MM3 (ref 3.4–10.8)

## 2020-01-11 PROCEDURE — 84100 ASSAY OF PHOSPHORUS: CPT

## 2020-01-11 PROCEDURE — 25010000002 CEFEPIME PER 500 MG: Performed by: NURSE PRACTITIONER

## 2020-01-11 PROCEDURE — 94799 UNLISTED PULMONARY SVC/PX: CPT

## 2020-01-11 PROCEDURE — 25010000002 ENOXAPARIN PER 10 MG: Performed by: NURSE PRACTITIONER

## 2020-01-11 PROCEDURE — 99232 SBSQ HOSP IP/OBS MODERATE 35: CPT | Performed by: HOSPITALIST

## 2020-01-11 PROCEDURE — 80048 BASIC METABOLIC PNL TOTAL CA: CPT | Performed by: NURSE PRACTITIONER

## 2020-01-11 PROCEDURE — 25010000002 VANCOMYCIN 10 G RECONSTITUTED SOLUTION: Performed by: HOSPITALIST

## 2020-01-11 PROCEDURE — 83735 ASSAY OF MAGNESIUM: CPT

## 2020-01-11 PROCEDURE — 85025 COMPLETE CBC W/AUTO DIFF WBC: CPT | Performed by: NURSE PRACTITIONER

## 2020-01-11 PROCEDURE — 83605 ASSAY OF LACTIC ACID: CPT | Performed by: NURSE PRACTITIONER

## 2020-01-11 PROCEDURE — 25010000002 METOCLOPRAMIDE PER 10 MG: Performed by: NURSE PRACTITIONER

## 2020-01-11 RX ADMIN — ANASTROZOLE 1 MG: 1 TABLET ORAL at 09:05

## 2020-01-11 RX ADMIN — IPRATROPIUM BROMIDE AND ALBUTEROL SULFATE 3 ML: .5; 3 SOLUTION RESPIRATORY (INHALATION) at 07:12

## 2020-01-11 RX ADMIN — ACETYLCYSTEINE 2 ML: 200 SOLUTION ORAL; RESPIRATORY (INHALATION) at 19:26

## 2020-01-11 RX ADMIN — GUAIFENESIN 600 MG: 100 SOLUTION ORAL at 09:04

## 2020-01-11 RX ADMIN — LISINOPRIL 40 MG: 20 TABLET ORAL at 06:23

## 2020-01-11 RX ADMIN — CEFEPIME 2 G: 2 INJECTION, POWDER, FOR SOLUTION INTRAVENOUS at 21:02

## 2020-01-11 RX ADMIN — Medication 250 MG: at 09:19

## 2020-01-11 RX ADMIN — OXYCODONE HYDROCHLORIDE 10 MG: 5 TABLET ORAL at 15:14

## 2020-01-11 RX ADMIN — VANCOMYCIN HYDROCHLORIDE 1250 MG: 10 INJECTION, POWDER, LYOPHILIZED, FOR SOLUTION INTRAVENOUS at 10:19

## 2020-01-11 RX ADMIN — PANTOPRAZOLE SODIUM 40 MG: 40 TABLET, DELAYED RELEASE ORAL at 05:49

## 2020-01-11 RX ADMIN — ACETYLCYSTEINE 4 ML: 200 SOLUTION ORAL; RESPIRATORY (INHALATION) at 07:12

## 2020-01-11 RX ADMIN — OXYCODONE HYDROCHLORIDE 10 MG: 5 TABLET ORAL at 21:01

## 2020-01-11 RX ADMIN — OXYCODONE HYDROCHLORIDE 10 MG: 5 TABLET ORAL at 05:49

## 2020-01-11 RX ADMIN — IPRATROPIUM BROMIDE AND ALBUTEROL SULFATE 3 ML: .5; 3 SOLUTION RESPIRATORY (INHALATION) at 19:26

## 2020-01-11 RX ADMIN — METOCLOPRAMIDE 5 MG: 5 INJECTION, SOLUTION INTRAMUSCULAR; INTRAVENOUS at 17:38

## 2020-01-11 RX ADMIN — GABAPENTIN 600 MG: 300 CAPSULE ORAL at 13:10

## 2020-01-11 RX ADMIN — ENOXAPARIN SODIUM 40 MG: 40 INJECTION SUBCUTANEOUS at 15:04

## 2020-01-11 RX ADMIN — GUAIFENESIN 600 MG: 100 SOLUTION ORAL at 13:09

## 2020-01-11 RX ADMIN — LEVOTHYROXINE SODIUM 175 MCG: 175 TABLET ORAL at 05:49

## 2020-01-11 RX ADMIN — METOCLOPRAMIDE 5 MG: 5 INJECTION, SOLUTION INTRAMUSCULAR; INTRAVENOUS at 11:32

## 2020-01-11 RX ADMIN — AMLODIPINE BESYLATE 5 MG: 5 TABLET ORAL at 06:22

## 2020-01-11 RX ADMIN — METOCLOPRAMIDE 5 MG: 5 INJECTION, SOLUTION INTRAMUSCULAR; INTRAVENOUS at 21:02

## 2020-01-11 RX ADMIN — METOCLOPRAMIDE 5 MG: 5 INJECTION, SOLUTION INTRAMUSCULAR; INTRAVENOUS at 08:57

## 2020-01-11 RX ADMIN — IPRATROPIUM BROMIDE AND ALBUTEROL SULFATE 3 ML: .5; 3 SOLUTION RESPIRATORY (INHALATION) at 11:45

## 2020-01-11 RX ADMIN — CEFEPIME 2 G: 2 INJECTION, POWDER, FOR SOLUTION INTRAVENOUS at 05:49

## 2020-01-11 RX ADMIN — GUAIFENESIN 600 MG: 100 SOLUTION ORAL at 17:38

## 2020-01-11 RX ADMIN — GABAPENTIN 600 MG: 300 CAPSULE ORAL at 05:49

## 2020-01-11 RX ADMIN — CEFEPIME 2 G: 2 INJECTION, POWDER, FOR SOLUTION INTRAVENOUS at 15:03

## 2020-01-11 RX ADMIN — GABAPENTIN 600 MG: 300 CAPSULE ORAL at 21:02

## 2020-01-11 RX ADMIN — GUAIFENESIN 600 MG: 100 SOLUTION ORAL at 21:01

## 2020-01-11 RX ADMIN — Medication 10 ML: at 21:02

## 2020-01-11 NOTE — PROGRESS NOTES
"      [facility] Medicine Services Daily Progress Note      Hospitalist Team  LOS 1 days      Patient Care Team:  Lorenza Parham MD as PCP - General  Lorenza Parham MD as PCP - Family Medicine    Patient Location: 244/1      Subjective   Subjective     Chief Complaint / Subjective  Chief Complaint   Patient presents with   • Altered Mental Status       Present on Admission:  • Dyspnea  • Tongue cancer (CMS/HCC)  • Hypothyroidism  • Neck abscess  • Pneumonia due to infectious organism  • Chronic GERD  • Hypertension      Brief Synopsis of Hospital Course/HPI  The patient is a 69-year-old female with a history of tongue cancer status post resection and then recurrence in the neck status post bilateral neck dissections and trach and G-tube placement who has been undergoing radiation to her right neck over the last week for recurrence.  Apparently each time she has had this these radiation treatments she had significant increase in secretions with hypoxia, and she ended up in the hospital last time as well.            Date:1/10/2020: The patient reports ongoing cough with significant pulmonary secretions that are requiring frequent suctioning.  She is tolerating tube feeds and having regular bowel movements.  She has had no further vomiting.  No  complaints.      ROS  12 point review of systems was reviewed and was negative except as above.    Objective   Objective      Vital Signs  Temp:  [98.8 °F (37.1 °C)-99.8 °F (37.7 °C)] 99.1 °F (37.3 °C)  Heart Rate:  [] 105  Resp:  [17-22] 22  BP: (105-113)/(65-69) 113/69  Oxygen Therapy  SpO2: 95 %  Pulse Oximetry Type: Intermittent  Device (Oxygen Therapy): humidified, tracheostomy collar  Flow (L/min): 10  Oxygen Concentration (%): 40  Flowsheet Rows      First Filed Value   Admission Height  162.6 cm (64\") Documented at 01/08/2020 2327   Admission Weight  64.3 kg (141 lb 12.1 oz) Documented at 01/08/2020 2327        Intake & Output (last 3 " days)       01/08 0701 - 01/09 0700 01/09 0701 - 01/10 0700 01/10 0701 - 01/11 0700    P.O.  0 0    Other  20     NG/GT  2275.5 360    Total Intake(mL/kg)  2295.5 (35.7) 360 (5.6)    Urine (mL/kg/hr)  600 (0.4) 500 (0.6)    Stool   0    Total Output  600 500    Net  +1695.5 -140           Urine Unmeasured Occurrence   2 x    Stool Unmeasured Occurrence   1 x        Lines, Drains & Airways    Active LDAs     Name:   Placement date:   Placement time:   Site:   Days:    Gastrostomy/Enterostomy LLQ   06/01/19    0301    LLQ   223    Surgical Airway Cuffed    01/09/20    1031    -- daughter unsure   1    Single Lumen Implantable Port 06/14/18 Left Chest   06/14/18    1456    Chest   575                  Physical Exam:    Physical Exam  Thin elderly female sitting up in bed on a trach tent in no acute distress; right neck with a large scabbed area with no drainage; lungs with diffuse coarse rhonchi bilaterally; CV regular rate and rhythm; abdomen soft and nontender with G-tube in place; extremities with no edema or calf tenderness.       Wounds (last 24 hours)      LDA Wound     Row Name 01/10/20 0701             Wound 01/09/20 0147 Right throat Abcess    Wound - Properties Group Date first assessed: 01/09/20  -DL Time first assessed: 0147  -DL Present on Hospital Admission: Y  -DL Side: Right  -DL Location: throat  -DL Primary Wound Type: Abcess  -DL    Dressing Appearance  open to air  -CR      Closure  Open to air  -CR        User Key  (r) = Recorded By, (t) = Taken By, (c) = Cosigned By    Initials Name Provider Type    Bee Inman, RN Registered Nurse    Lynne Shane, RN Registered Nurse          Procedures:              Results Review:     I reviewed the patient's new clinical results.      Lab Results (last 24 hours)     Procedure Component Value Units Date/Time    Respiratory Panel, PCR - Swab, Nasopharynx [[id number]]  (Normal) Collected:  01/10/20 1634    Specimen:  Swab from Nasopharynx Updated:   01/10/20 1922     ADENOVIRUS, PCR Not Detected     Coronavirus 229E Not Detected     Coronavirus HKU1 Not Detected     Coronavirus NL63 Not Detected     Coronavirus OC43 Not Detected     Human Metapneumovirus Not Detected     Human Rhinovirus/Enterovirus Not Detected     Influenza B PCR Not Detected     Parainfluenza Virus 1 Not Detected     Parainfluenza Virus 2 Not Detected     Parainfluenza Virus 3 Not Detected     Parainfluenza Virus 4 Not Detected     Bordetella pertussis pcr Not Detected     Influenza A H1 2009 PCR Not Detected     Chlamydophila pneumoniae PCR Not Detected     Mycoplasma pneumo by PCR Not Detected     Influenza A PCR Not Detected     Influenza A H3 Not Detected     Influenza A H1 Not Detected     RSV, PCR Not Detected    Respiratory Culture - Sputum, Throat [[id number]] Collected:  01/10/20 1452    Specimen:  Sputum from Throat Updated:  01/10/20 1635    Procalcitonin [[id number]]  (Normal) Collected:  01/09/20 1416    Specimen:  Blood Updated:  01/10/20 1336     Procalcitonin 0.11 ng/mL     Narrative:       Results may be falsely decreased if patient taking Biotin.     Blood Culture - Blood, Blood, Venous Line [[id number]] Collected:  01/09/20 0050    Specimen:  Blood, Venous Line Updated:  01/10/20 0100     Blood Culture No growth at 24 hours        No results found for: HGBA1C        Results from last 7 days   Lab Units 01/08/20  2348   PH, ARTERIAL pH units 7.495*   PO2 ART mm Hg 76.8*   PCO2, ARTERIAL mm Hg 43.4   HCO3 ART mmol/L 33.5*     Lab Results   Component Value Date    LIPASE 28 08/04/2018     Lab Results   Component Value Date    CHOL 185 01/11/2018    TRIG 78 01/11/2018    HDL 53 01/11/2018     (H) 01/11/2018       Lab Results   Lab Value Date/Time    FINALDX  01/17/2019 1200     1. Tongue, Right, Biopsy: Squamous mucosa and submucosa with    A. 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   B. Epithelial erosion with acute inflammation with  surface Candidiasis.    [facility]         FINALDX  07/10/2018 1100     1.  RIGHT TONGUE, BIOPSY:              POLYPOID GRANULATION TISSUE WITH ACUTE AND CHRONIC INFLAMMATION, ULCERATION, AND                     ADJACENT REACTIVE SQUAMOUS EPITHELIUM.    [facility]/  [facility]/a/CMK    CPT CODES:  1. 75955               Microbiology Results (last 10 days)     Procedure Component Value - Date/Time    Respiratory Panel, PCR - Swab, Nasopharynx [[id number]]  (Normal) Collected:  01/10/20 1634    Lab Status:  Final result Specimen:  Swab from Nasopharynx Updated:  01/10/20 1922     ADENOVIRUS, PCR Not Detected     Coronavirus 229E Not Detected     Coronavirus HKU1 Not Detected     Coronavirus NL63 Not Detected     Coronavirus OC43 Not Detected     Human Metapneumovirus Not Detected     Human Rhinovirus/Enterovirus Not Detected     Influenza B PCR Not Detected     Parainfluenza Virus 1 Not Detected     Parainfluenza Virus 2 Not Detected     Parainfluenza Virus 3 Not Detected     Parainfluenza Virus 4 Not Detected     Bordetella pertussis pcr Not Detected     Influenza A H1 2009 PCR Not Detected     Chlamydophila pneumoniae PCR Not Detected     Mycoplasma pneumo by PCR Not Detected     Influenza A PCR Not Detected     Influenza A H3 Not Detected     Influenza A H1 Not Detected     RSV, PCR Not Detected    Blood Culture - Blood, Blood, Venous Line [[id number]] Collected:  01/09/20 0050    Lab Status:  Preliminary result Specimen:  Blood, Venous Line Updated:  01/10/20 0100     Blood Culture No growth at 24 hours          ECG/EMG Results (most recent)     None                    Xr Chest 1 View    Result Date: 1/9/2020   1. Right medial basilar infiltrate. Correlate clinically for pneumonia. Follow-up evaluation recommended to document resolution.  Electronically Signed By-Fabián Singleton On:1/9/2020 8:05 AM This report was finalized on [id number] by  Fabián Singleton, .          Xrays, labs reviewed personally by physician.    Medication  Review:   I have reviewed the patient's current medication list      Scheduled Meds    [START ON 1/13/2020] !Vancomycin Level Draw Needed  Does not apply Once   [START ON 1/13/2020] !Vancomycin Level Draw Needed  Does not apply Once   acetylcysteine 2 mL Nebulization BID   amLODIPine 5 mg Oral QAM   anastrozole 1 mg Oral Daily   cefepime 2 g Intravenous Q8H   enoxaparin 40 mg Subcutaneous Q24H   fentaNYL 1 patch Transdermal Q72H   gabapentin 600 mg Oral Q8H   guaiFENesin 600 mg Oral 4x Daily   ipratropium-albuterol 3 mL Nebulization Q6H While Awake - RT   levothyroxine 175 mcg Oral Q AM   lisinopril 40 mg Oral QAM   metoclopramide 5 mg Intravenous 4x Daily AC & at Bedtime   pantoprazole 40 mg Oral Q AM   saccharomyces boulardii 250 mg Oral Daily   vancomycin 1,250 mg Intravenous Q18H       Meds Infusions    Pharmacy to dose vancomycin     sodium chloride 75 mL/hr Last Rate: 75 mL/hr (01/09/20 0359)       Meds PRN  ipratropium-albuterol  •  LORazepam  •  oxyCODONE  •  Pharmacy to dose vancomycin  •  [COMPLETED] Insert peripheral IV **AND** sodium chloride        Assessment/Plan   Assessment/Plan     Active Hospital Problems    Diagnosis  POA   • **Dyspnea [R06.00]  Yes   • Hypothyroidism [E03.9]  Yes   • Pneumonia due to infectious organism [J18.9]  Yes   • History of right breast cancer [Z85.3]  Not Applicable   • Chronic GERD [K21.9]  Yes   • Hypertension [I10]  Yes   • Neck abscess [L02.11]  Yes   • Tongue cancer (CMS/HCC) [C02.9]  Yes      Resolved Hospital Problems   No resolved problems to display.       MEDICAL DECISION MAKING COMPLEXITY BY PROBLEM:     Right lower lobe pneumonia  -Respiratory virus panel negative  -Sputum culture pending  -Continue Zithromax and Rocephin and monitor cultures.     Tongue cancer status post resection, status post recurrence, status post bilateral neck resections and trach and feeding tube placement; status post radiation, chemo and immunotherapy.  Patient just finished a 5-day  course of radiation treatment to the right neck on 1/8/2020.    Leukocytosis, likely secondary to above: Monitor     Chronic anemia, hemoglobin 9.0: Monitor     Intermittent vomiting, without associated nausea, uncertain etiology: Nutrition consult to evaluate tube feeding; add Reglan 5 mg IV 4 times daily    --Note sodium 126     Hyponatremia, moderate, sodium 126--likely secondary to free water with tube feeding: Decrease free water; gentle IV fluids normal saline; repeat BMP     Chronic pain--secondary to surgery and neck cancer: Continue Roxicodone, Duragesic patch, and Neurontin     Essential hypertension, chronic: Continue amlodipine, lisinopril     Hormone suppression therapy: Continue Arimidex     Gastric reflux: continue pepcid      Anxiety, chronic: continue ativan     Hypothyroidism, chronic: TSH 2.1; continue Synthroid    VTE Prophylaxis - Lovenox 40 mg SC daily.      Code Status -   Code Status and Medical Interventions:   Ordered at: 01/09/20 0226     Code Status:    CPR     Medical Interventions (Level of Support Prior to Arrest):    Full       Discharge Planning          Destination      Coordination has not been started for this encounter.      Durable Medical Equipment      Coordination has not been started for this encounter.      Dialysis/Infusion      Coordination has not been started for this encounter.      Home Medical Care      Service Provider Request Status Selected Services Address Phone Number Fax Number    [facility] Accept[facility]/A [address] [address] [phone]      Therapy      Coordination has not been started for this encounter.      Community Resources      Coordination has not been started for this encounter.            Electronically signed by Viola Nguyen MD, 01/10/20, 7:35 PM.  [facility] Hospitalist Team

## 2020-01-11 NOTE — PROGRESS NOTES
Nutrition Services    Patient Name:  Elma Sheikh  YOB: 1950  MRN: [id number]  Admit Date:  1/8/2020    Progress note:    TF orders: Isosource 1.5 @ 60 ml/hr + 10 ml q 4 hrs water flush  At visit observed Nutren 1.5 infusing @ 60 ml/hr (incorrect formula), however correct flush of 10 ml q4 hrs water flush programmed into pump. Spoke with ROBERT Olguin who is to hang correct TF formula. Residuals WNL. Daughter at bedside reports tolerating TF well without complaints. No further N/V. 1/10 + BM x 2.     Labs: Gluc 145, Na 129 L, K+ 3.7, Crt 0.4 L, BUN 9, Phos 2.5, Mg 1.9, Hgb 9.6 L, Hct 29.0 L    Hyponatremia improving. Will continue to monitor.     Electronically signed by:  Fide South RD  01/11/20 3:35 PM

## 2020-01-11 NOTE — PLAN OF CARE
Problem: Patient Care Overview  Goal: Plan of Care Review  Outcome: Ongoing (interventions implemented as appropriate)  Flowsheets (Taken 1/11/2020 5575)  Progress: no change  Outcome Summary: Pt. stable and rested through the night. Pt. up to BSC with x1 assist. Can ambulate independently but presses call light for assistance. Still recieving abx. Will contine to monitor.

## 2020-01-11 NOTE — PROGRESS NOTES
"Pharmacy Antimicrobial Dosing Service    Subjective:  Elma Sheikh is a 69 y.o.female with HAP.    PMH includes: breast cancer, tongue cancer, s/p removal of tongue, tracheostomy, hypothyroidism, HTN      Assessment/Plan    1. Day #2 Vancomycin: Goal -600 mcg*h/mL and Goal trough 10-20 mcg/mL. 1250mg q18h (19.5mg/kg).  Levels prior to 5th dose as follows:  Peak 1/13 at 0200 and Trough 1/13 at 1500    2. Day #2Cefepime: 2000mg IV q8h for estCrCl > 60 mL/min.    Will continue to monitor drug levels, renal function, culture and sensitivities, and patient clinical status.       Objective:  Relevant clinical data and objective history reviewed:  162.6 cm (64\")   64.1 kg (141 lb 5 oz)   Ideal body weight: 54.7 kg (120 lb 9.5 oz)  Adjusted ideal body weight: 58.5 kg (128 lb 14.1 oz)  Body mass index is 24.26 kg/m².        Results from last 7 days   Lab Units 01/11/20  0359 01/08/20  2354   CREATININE mg/dL 0.40* 0.46*     Estimated Creatinine Clearance: 69.1 mL/min (A) (by C-G formula based on SCr of 0.4 mg/dL (L)).  I/O last 3 completed shifts:  In: 2685.5 [Other:50; NG/GT:2635.5]  Out: 1600 [Urine:1600]    Results from last 7 days   Lab Units 01/11/20  0400 01/08/20  2354   WBC 10*3/mm3 9.20 11.80*     Temperature    01/10/20 1150 01/10/20 1950 01/11/20 0400   Temp: 99.1 °F (37.3 °C) 98.6 °F (37 °C) 98.5 °F (36.9 °C)     Baseline culture/source/susceptibility:  Microbiology Results (last 10 days)       Procedure Component Value - Date/Time    Legionella Antigen, Urine - Urine, Urine, Clean Catch [[id number]]  (Normal) Collected:  01/10/20 1713    Lab Status:  Final result Specimen:  Urine, Clean Catch Updated:  01/11/20 0910     LEGIONELLA ANTIGEN, URINE Negative    S. Pneumo Ag Urine or CSF - Urine, Urine, Clean Catch [[id number]]  (Normal) Collected:  01/10/20 1713    Lab Status:  Final result Specimen:  Urine, Clean Catch Updated:  01/11/20 0910     Strep Pneumo Ag Negative    Respiratory Panel, PCR - Swab, " Nasopharynx [[id number]]  (Normal) Collected:  01/10/20 1634    Lab Status:  Final result Specimen:  Swab from Nasopharynx Updated:  01/10/20 1922     ADENOVIRUS, PCR Not Detected     Coronavirus 229E Not Detected     Coronavirus HKU1 Not Detected     Coronavirus NL63 Not Detected     Coronavirus OC43 Not Detected     Human Metapneumovirus Not Detected     Human Rhinovirus/Enterovirus Not Detected     Influenza B PCR Not Detected     Parainfluenza Virus 1 Not Detected     Parainfluenza Virus 2 Not Detected     Parainfluenza Virus 3 Not Detected     Parainfluenza Virus 4 Not Detected     Bordetella pertussis pcr Not Detected     Influenza A H1 2009 PCR Not Detected     Chlamydophila pneumoniae PCR Not Detected     Mycoplasma pneumo by PCR Not Detected     Influenza A PCR Not Detected     Influenza A H3 Not Detected     Influenza A H1 Not Detected     RSV, PCR Not Detected    Respiratory Culture - Sputum, Throat [[id number]] Collected:  01/10/20 1452    Lab Status:  Preliminary result Specimen:  Sputum from Throat Updated:  01/11/20 1013     Respiratory Culture Growth present, too young to evaluate     Gram Stain Many (4+) WBCs seen      Few (2+) Epithelial cells seen    Blood Culture - Blood, Blood, Venous Line [[id number]] Collected:  01/09/20 0050    Lab Status:  Preliminary result Specimen:  Blood, Venous Line Updated:  01/11/20 0100     Blood Culture No growth at 2 days             Anti-Infectives (From admission, onward)      Ordered     Dose/Rate Route Frequency Start Stop    01/10/20 1509  !Vancomycin Level Draw Needed     Ordering Provider:  Viola Nguyen MD     Does not apply Once 01/13/20 1500      01/10/20 1509  !Vancomycin Level Draw Needed     Ordering Provider:  Viola Nguyen MD     Does not apply Once 01/13/20 0200      01/10/20 1501  vancomycin 1250 mg/250 mL 0.9% NS IVPB ([facility])  Review   Ordering Provider:  Viola Nguyen MD    1,250 mg  over 120 Minutes Intravenous Every 18 Hours 01/10/20 1600  01/21/20 0359    01/10/20 1320  cefepime 2 gm IVPB in 100 ml NS ([facility])  Review   Ordering Provider:  Beverly Nunes APRN    2 g Intravenous Every 8 Hours 01/10/20 1400 01/17/20 1359    01/10/20 1320  Pharmacy to dose vancomycin  Review   Ordering Provider:  Beverly Nunes APRN     Does not apply Continuous PRN 01/10/20 1320 01/17/20 1319    01/09/20 0029  azithromycin (ZITHROMAX) 500 mg in 250mL NS IVPB     Ordering Provider:  Fabián Torres MD    500 mg  over 60 Minutes Intravenous Once 01/09/20 0100 01/09/20 0151    01/09/20 0029  cefTRIAXone (ROCEPHIN) in SWFI 2 GRAMS/20ml IV PUSH syringe     Ordering Provider:  Fabián Torres MD    2 g  over 5 Minutes Intravenous Once 01/09/20 0031 01/09/20 0056            Charito Roa [facility]  01/11/20 10:51 AM

## 2020-01-11 NOTE — PROGRESS NOTES
"Pharmacy Antimicrobial Dosing Service    Subjective:  Elma Sheikh is a 69 y.o.female with HAP.    PMH includes: breast cancer, tongue cancer, s/p removal of tongue, tracheostomy, hypothyroidism, HTN      Assessment/Plan    1. Day #2 Vancomycin: Goal -600 mcg*h/mL and Goal trough 10-20 mcg/mL. 1250mg q18h (19.5mg/kg).  Levels prior to 5th dose as follows:  Peak 1/13 at 0200 and Trough 1/13 at 1500    2. Day #2Cefepime: 2000mg IV q8h for estCrCl > 60 mL/min.    Will continue to monitor drug levels, renal function, culture and sensitivities, and patient clinical status.       Objective:  Relevant clinical data and objective history reviewed:  162.6 cm (64\")   64.1 kg (141 lb 5 oz)   Ideal body weight: 54.7 kg (120 lb 9.5 oz)  Adjusted ideal body weight: 58.5 kg (128 lb 14.1 oz)  Body mass index is 24.26 kg/m².        Results from last 7 days   Lab Units 01/11/20  0359 01/08/20  2354   CREATININE mg/dL 0.40* 0.46*     Estimated Creatinine Clearance: 69.1 mL/min (A) (by C-G formula based on SCr of 0.4 mg/dL (L)).  I/O last 3 completed shifts:  In: 2685.5 [Other:50; NG/GT:2635.5]  Out: 1600 [Urine:1600]    Results from last 7 days   Lab Units 01/11/20  0400 01/08/20  2354   WBC 10*3/mm3 9.20 11.80*     Temperature    01/10/20 1150 01/10/20 1950 01/11/20 0400   Temp: 99.1 °F (37.3 °C) 98.6 °F (37 °C) 98.5 °F (36.9 °C)     Baseline culture/source/susceptibility:  Microbiology Results (last 10 days)       Procedure Component Value - Date/Time    Legionella Antigen, Urine - Urine, Urine, Clean Catch [[id number]]  (Normal) Collected:  01/10/20 1713    Lab Status:  Final result Specimen:  Urine, Clean Catch Updated:  01/11/20 0910     LEGIONELLA ANTIGEN, URINE Negative    S. Pneumo Ag Urine or CSF - Urine, Urine, Clean Catch [[id number]]  (Normal) Collected:  01/10/20 1713    Lab Status:  Final result Specimen:  Urine, Clean Catch Updated:  01/11/20 0910     Strep Pneumo Ag Negative    Respiratory Panel, PCR - Swab, " Nasopharynx [[id number]]  (Normal) Collected:  01/10/20 1634    Lab Status:  Final result Specimen:  Swab from Nasopharynx Updated:  01/10/20 1922     ADENOVIRUS, PCR Not Detected     Coronavirus 229E Not Detected     Coronavirus HKU1 Not Detected     Coronavirus NL63 Not Detected     Coronavirus OC43 Not Detected     Human Metapneumovirus Not Detected     Human Rhinovirus/Enterovirus Not Detected     Influenza B PCR Not Detected     Parainfluenza Virus 1 Not Detected     Parainfluenza Virus 2 Not Detected     Parainfluenza Virus 3 Not Detected     Parainfluenza Virus 4 Not Detected     Bordetella pertussis pcr Not Detected     Influenza A H1 2009 PCR Not Detected     Chlamydophila pneumoniae PCR Not Detected     Mycoplasma pneumo by PCR Not Detected     Influenza A PCR Not Detected     Influenza A H3 Not Detected     Influenza A H1 Not Detected     RSV, PCR Not Detected    Respiratory Culture - Sputum, Throat [[id number]] Collected:  01/10/20 1452    Lab Status:  Preliminary result Specimen:  Sputum from Throat Updated:  01/11/20 1013     Respiratory Culture Growth present, too young to evaluate     Gram Stain Many (4+) WBCs seen      Few (2+) Epithelial cells seen    Blood Culture - Blood, Blood, Venous Line [[id number]] Collected:  01/09/20 0050    Lab Status:  Preliminary result Specimen:  Blood, Venous Line Updated:  01/11/20 0100     Blood Culture No growth at 2 days             Anti-Infectives (From admission, onward)      Ordered     Dose/Rate Route Frequency Start Stop    01/10/20 1509  !Vancomycin Level Draw Needed     Ordering Provider:  Viola Nguyen MD     Does not apply Once 01/13/20 1500      01/10/20 1509  !Vancomycin Level Draw Needed     Ordering Provider:  Viola Nguyen MD     Does not apply Once 01/13/20 0200      01/10/20 1501  vancomycin 1250 mg/250 mL 0.9% NS IVPB ([facility])  Review   Ordering Provider:  Viola Nguyen MD    1,250 mg  over 120 Minutes Intravenous Every 18 Hours 01/10/20 1600  01/21/20 0359    01/10/20 1320  cefepime 2 gm IVPB in 100 ml NS ([facility])  Review   Ordering Provider:  Beverly Nunes APRN    2 g Intravenous Every 8 Hours 01/10/20 1400 01/17/20 1359    01/10/20 1320  Pharmacy to dose vancomycin  Review   Ordering Provider:  Beverly Nunes APRN     Does not apply Continuous PRN 01/10/20 1320 01/17/20 1319    01/09/20 0029  azithromycin (ZITHROMAX) 500 mg in 250mL NS IVPB     Ordering Provider:  Fabián Torres MD    500 mg  over 60 Minutes Intravenous Once 01/09/20 0100 01/09/20 0151    01/09/20 0029  cefTRIAXone (ROCEPHIN) in SWFI 2 GRAMS/20ml IV PUSH syringe     Ordering Provider:  Fabián Torres MD    2 g  over 5 Minutes Intravenous Once 01/09/20 0031 01/09/20 0056            Charito Roa [facility]  01/11/20 10:51 AM

## 2020-01-11 NOTE — PROGRESS NOTES
"KPA/PULM/CC PROGRESS NOTE     History of present illness  The patient is a 69-year-old who presented to the emergency department for evaluation of shortness of air and hypoxia.  The following information has been supplemented by review of documentation and interview with the patient's niece at the bedside as the patient is nonverbal with a tracheostomy in place making communication difficult.  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  She completed a 5-day cycle of chemotherapy on 1/8/2020 in [facility].  Over the past 2 days the patient has had progressive worsening of shortness of air.  She reports that she has had a minimal amount of clear and yellow expectoration, subjective fever and chills, emesis, diarrhea for the past 3 to 4 days.  She receives intermittent tube feeds via PEG and reports that the emesis looked like tube feeds and she did not have nausea prior to the events.  Her family reported that at home her room air saturation dropped to the 70s and she presented to the emergency department for further evaluation.     In the emergency department chest x-ray revealed right lower lobe filtrate consistent with pneumonia.  WBCs 11.80, lactate was not performed.  ABG on 40% trach collar: 7.4 9/43/76/30 3/96%.  The patient was admitted for further evaluation and treatment of pneumonia and hypoxic respiratory failure.      SUBJECTIVE    1/11: Patient reports feeling somewhat better today.  Shortness of air has improved.  She denies chest pain.  Tolerating O2 at 40% via trach mask.  Tolerating tube feeds    OBJECTIVE    /73 (BP Location: Left arm, Patient Position: Lying)   Pulse 98   Temp 98.5 °F (36.9 °C) (Oral)   Resp 16   Ht 162.6 cm (64\")   Wt 64.1 kg (141 lb 5 oz)   LMP  (LMP Unknown)   SpO2 98%   Breastfeeding No   BMI 24.26 kg/m²   "   Intake/Output last 3 shifts:  I/O last 3 completed shifts:  In: 2685.5 [Other:50; NG/GT:2635.5]  Out: 1600 [Urine:1600]  Intake/Output this shift:  I/O this shift:  In: 30 [Other:30]  Out: -     Constitutional:  Well developed, well nourished, no acute distress, chronically ill-appearing  Eyes:  PERRL, conjunctiva normal   HENT:  Atraumatic, external ears normal, nose normal, tongue prosthesis in place  Neck-midline tracheostomy.  Right lateral neck wound, dry with no drainage  Respiratory:  No respiratory distress, scattered rhonchi and mild wheezing R>L, coarse bases, no rales  Cardiovascular: Regular rate, systolic murmur, no gallops, no rubs   GI:  Soft, nondistended, normal bowel sounds, nontender, left upper quadrant feeding tube, no rebound, no guarding   : Deferred  Musculoskeletal:  No edema, no tenderness, no deformities  Integument:  Well hydrated, no rash.  Right neck wound  Neurologic:  Alert & appropriate.  Unable to establish orientation, patient is nonverbal however she does seem to communicate appropriately.  No lateralizing deficits  Psychiatric: Calm    Scheduled Meds:  [START ON 1/13/2020] !Vancomycin Level Draw Needed  Does not apply Once   [START ON 1/13/2020] !Vancomycin Level Draw Needed  Does not apply Once   acetylcysteine 2 mL Nebulization BID   amLODIPine 5 mg Oral QAM   anastrozole 1 mg Oral Daily   cefepime 2 g Intravenous Q8H   enoxaparin 40 mg Subcutaneous Q24H   fentaNYL 1 patch Transdermal Q72H   gabapentin 600 mg Oral Q8H   guaiFENesin 600 mg Oral 4x Daily   ipratropium-albuterol 3 mL Nebulization Q6H While Awake - RT   levothyroxine 175 mcg Oral Q AM   lisinopril 40 mg Oral QAM   metoclopramide 5 mg Intravenous 4x Daily AC & at Bedtime   pantoprazole 40 mg Oral Q AM   saccharomyces boulardii 250 mg Oral Daily   vancomycin 1,250 mg Intravenous Q18H       Continuous Infusions:  Pharmacy to dose vancomycin     sodium chloride 75 mL/hr Last Rate: 75 mL/hr (01/09/20 0359)       PRN  Meds:ipratropium-albuterol  •  LORazepam  •  oxyCODONE  •  Pharmacy to dose vancomycin  •  [COMPLETED] Insert peripheral IV **AND** sodium chloride     Data Review:  Lab Results (last 24 hours)     Procedure Component Value Units Date/Time    Respiratory Culture - Sputum, Throat [[id number]] Collected:  01/10/20 1452    Specimen:  Sputum from Throat Updated:  01/11/20 1013     Respiratory Culture Growth present, too young to evaluate     Gram Stain Many (4+) WBCs seen      Few (2+) Epithelial cells seen    Legionella Antigen, Urine - Urine, Urine, Clean Catch [[id number]]  (Normal) Collected:  01/10/20 1713    Specimen:  Urine, Clean Catch Updated:  01/11/20 0910     LEGIONELLA ANTIGEN, URINE Negative    S. Pneumo Ag Urine or CSF - Urine, Urine, Clean Catch [[id number]]  (Normal) Collected:  01/10/20 1713    Specimen:  Urine, Clean Catch Updated:  01/11/20 0910     Strep Pneumo Ag Negative    Basic Metabolic Panel [[id number]]  (Abnormal) Collected:  01/11/20 0359    Specimen:  Blood Updated:  01/11/20 0439     Glucose 145 mg/dL      BUN 9 mg/dL      Creatinine 0.40 mg/dL      Sodium 129 mmol/L      Potassium 3.7 mmol/L      Chloride 84 mmol/L      CO2 34.0 mmol/L      Calcium 8.8 mg/dL      eGFR Non African Amer >150 mL/min/1.73      BUN/Creatinine Ratio 22.5     Anion Gap 11.0 mmol/L     Narrative:       GFR Normal >60  Chronic Kidney Disease <60  Kidney Failure <15      Phosphorus [[id number]]  (Normal) Collected:  01/11/20 0359    Specimen:  Blood Updated:  01/11/20 0439     Phosphorus 2.5 mg/dL     Magnesium [[id number]]  (Normal) Collected:  01/11/20 0359    Specimen:  Blood Updated:  01/11/20 0432     Magnesium 1.9 mg/dL     Lactic Acid, Plasma [[id number]]  (Normal) Collected:  01/11/20 0400    Specimen:  Blood Updated:  01/11/20 0432     Lactate 1.8 mmol/L     CBC & Differential [[id number]] Collected:  01/11/20 0400    Specimen:  Blood Updated:  01/11/20 0412    Narrative:       The following orders were created  for panel order CBC & Differential.  Procedure                               Abnormality         Status                     ---------                               -----------         ------                     CBC Auto Differential[[id number]]        Abnormal            Final result                 Please view results for these tests on the individual orders.    CBC Auto Differential [[id number]]  (Abnormal) Collected:  01/11/20 0400    Specimen:  Blood Updated:  01/11/20 0412     WBC 9.20 10*3/mm3      RBC 3.55 10*6/mm3      Hemoglobin 9.6 g/dL      Hematocrit 29.0 %      MCV 81.8 fL      MCH 26.9 pg      MCHC 33.0 g/dL      RDW 16.3 %      RDW-SD 47.3 fl      MPV 6.5 fL      Platelets 437 10*3/mm3      Neutrophil % 81.6 %      Lymphocyte % 9.0 %      Monocyte % 8.1 %      Eosinophil % 1.1 %      Basophil % 0.2 %      Neutrophils, Absolute 7.50 10*3/mm3      Lymphocytes, Absolute 0.80 10*3/mm3      Monocytes, Absolute 0.70 10*3/mm3      Eosinophils, Absolute 0.10 10*3/mm3      Basophils, Absolute 0.00 10*3/mm3      nRBC 0.0 /100 WBC     Blood Culture - Blood, Blood, Venous Line [[id number]] Collected:  01/09/20 0050    Specimen:  Blood, Venous Line Updated:  01/11/20 0100     Blood Culture No growth at 2 days    Respiratory Panel, PCR - Swab, Nasopharynx [[id number]]  (Normal) Collected:  01/10/20 1634    Specimen:  Swab from Nasopharynx Updated:  01/10/20 1922     ADENOVIRUS, PCR Not Detected     Coronavirus 229E Not Detected     Coronavirus HKU1 Not Detected     Coronavirus NL63 Not Detected     Coronavirus OC43 Not Detected     Human Metapneumovirus Not Detected     Human Rhinovirus/Enterovirus Not Detected     Influenza B PCR Not Detected     Parainfluenza Virus 1 Not Detected     Parainfluenza Virus 2 Not Detected     Parainfluenza Virus 3 Not Detected     Parainfluenza Virus 4 Not Detected     Bordetella pertussis pcr Not Detected     Influenza A H1 2009 PCR Not Detected     Chlamydophila pneumoniae PCR Not  Detected     Mycoplasma pneumo by PCR Not Detected     Influenza A PCR Not Detected     Influenza A H3 Not Detected     Influenza A H1 Not Detected     RSV, PCR Not Detected    Procalcitonin [[id number]]  (Normal) Collected:  01/09/20 1416    Specimen:  Blood Updated:  01/10/20 1336     Procalcitonin 0.11 ng/mL     Narrative:       Results may be falsely decreased if patient taking Biotin.              Imaging:  Imaging Results (Last 72 Hours)     Procedure Component Value Units Date/Time    XR Chest 1 View [[id number]] Collected:  01/09/20 0804     Updated:  01/09/20 0808    Narrative:       DATE OF EXAM:  1/9/2020 12:11 AM     PROCEDURE:  XR CHEST 1 VW-     INDICATIONS:  Dyspnea     COMPARISON:  6/16/2019     TECHNIQUE:   Single radiographic view of the chest was obtained.     FINDINGS:  Tracheostomy tube is again noted. There is a left subclavian  Upmzfo-w-Lkyn catheter which appears unchanged. There is elevation of  the right hemidiaphragm again noted. There is increasing alveolar  opacity in the medial right lung base suggesting infiltrate. The lungs  are otherwise clear. Pulmonary vascularity appears within normal limits.  Heart size and mediastinal contour appear stable. There is degenerative  change in the spine. There is limited visualization of the right lung  apex due to overlying apparatus.       Impression:          1. Right medial basilar infiltrate. Correlate clinically for pneumonia.  Follow-up evaluation recommended to document resolution.     Electronically Signed By-Fabián Singleton On:1/9/2020 8:05 AM  This report was finalized on [id number] by  Fabián Singleton, .          ASSESSMENT    Healthcare acquired pneumonia, present on admission.  Patient has been undergoing chemotherapy  Acute hypoxemic respiratory failure secondary to pneumonia  Vomiting, currently resolved  Hyponatremia  Oral cancer status post bilateral radical neck dissection and subsequent recurrence of right neck mass.  Status post  radiation and chemotherapy  History of breast cancer  Valvular heart disease with known murmur        PLAN    Titrate O2 to maintain a saturation of 91%.  Currently on 40% trach collar  Broad-spectrum antibiotic coverage to cefepime and vancomycin  Sputum culture via tracheal suction pending  Procalcitonin pending  Urine antigens for Legionella and strep pneumo negative  Respiratory viral panel negative  Blood cultures pending  Continue mucolytic  DuoNeb scheduled and PRN  Mucomyst nebs     Chronic conditions managed per primary

## 2020-01-11 NOTE — PROGRESS NOTES
[facility] Medicine Services Daily Progress Note      Hospitalist Team  LOS 2 days      Patient Care Team:  Lorenza Parham MD as PCP - General  Lorenza Parham MD as PCP - Family Medicine    Patient Location: [facility]      Subjective   Subjective     Chief Complaint / Subjective  Chief Complaint   Patient presents with   • Altered Mental Status       Present on Admission:  • Dyspnea  • Tongue cancer (CMS/HCC)  • Hypothyroidism  • Neck abscess  • Pneumonia due to infectious organism  • Chronic GERD  • Hypertension      Brief Synopsis of Hospital Course/HPI  The patient is a 69-year-old female with a history of tongue cancer status post resection and then recurrence in the neck status post bilateral neck dissections and trach and G-tube placement who has been undergoing radiation to her right neck over the last week for recurrence.  Apparently each time she has had this these radiation treatments she had significant increase in secretions with hypoxia, and she ended up in the hospital last time as well.      Date:1/10/2020: The patient reports ongoing cough with significant pulmonary secretions that are requiring frequent suctioning.  She is tolerating tube feeds and having regular bowel movements.  She has had no further vomiting.  No  complaints.  1/11/2020: The patient continues to have copious secretions that require frequent deep tracheal suctioning.  She reports feeling better overall however.  She is tolerating tube feeds and having normal bowel movements and urinating normally.      ROS  12 point review of systems was reviewed and was negative except as above.    Objective   Objective      Vital Signs  Temp:  [98.5 °F (36.9 °C)-98.6 °F (37 °C)] 98.5 °F (36.9 °C)  Heart Rate:  [] 98  Resp:  [16-22] 16  BP: (112-129)/(62-73) 129/73  Oxygen Therapy  SpO2: 98 %  Pulse Oximetry Type: Intermittent  Device (Oxygen Therapy): tracheostomy collar  Flow (L/min): 10  Oxygen  "Concentration (%): 40  Flowsheet Rows      First Filed Value   Admission Height  162.6 cm (64\") Documented at 01/08/2020 2327   Admission Weight  64.3 kg (141 lb 12.1 oz) Documented at 01/08/2020 2327        Intake & Output (last 3 days)       01/08 0701 - 01/09 0700 01/09 0701 - 01/10 0700 01/10 0701 - 01/11 0700 01/11 0701 - 01/12 0700    P.O.  0 0     Other  20 40 30    NG/GT  2275.5 360     Total Intake(mL/kg)  2295.5 (35.7) 400 (6.2) 30 (0.5)    Urine (mL/kg/hr)  600 (0.4) 1400 (0.9)     Stool   0     Total Output  600 1400     Net  +1695.5 -1000 +30            Urine Unmeasured Occurrence   3 x     Stool Unmeasured Occurrence   2 x         Lines, Drains & Airways    Active LDAs     Name:   Placement date:   Placement time:   Site:   Days:    Gastrostomy/Enterostomy LLQ   06/01/19    0301    LLQ   223    Surgical Airway Cuffed    01/09/20    1031    -- daughter unsure   1    Single Lumen Implantable Port 06/14/18 Left Chest   06/14/18    1456    Chest   575                  Physical Exam:    Physical Exam  Thin elderly female awake and alert sitting up in bed on a trach tent in no acute distress; right neck with a large scabbed area with no drainage; lungs with scattered rhonchi bilaterally; CV regular rate and rhythm; abdomen soft and nontender with G-tube in place; extremities with no edema or calf tenderness.       Wounds (last 24 hours)      LDA Wound     Row Name 01/10/20 0701             Wound 01/09/20 0147 Right throat Abcess    Wound - Properties Group Date first assessed: 01/09/20  -DL Time first assessed: 0147  -DL Present on Hospital Admission: Y  -DL Side: Right  -DL Location: throat  -DL Primary Wound Type: Abcess  -DL    Dressing Appearance  open to air  -CR      Closure  Open to air  -CR        User Key  (r) = Recorded By, (t) = Taken By, (c) = Cosigned By    Initials Name Provider Type    CR Bee Fernández, RN Registered Nurse    Lynne Shane RN Registered Nurse    "       Procedures:              Results Review:     I reviewed the patient's new clinical results.      Lab Results (last 24 hours)     Procedure Component Value Units Date/Time    Respiratory Culture - Sputum, Throat [[id number]] Collected:  01/10/20 1452    Specimen:  Sputum from Throat Updated:  01/11/20 1013     Respiratory Culture Growth present, too young to evaluate     Gram Stain Many (4+) WBCs seen      Few (2+) Epithelial cells seen    Legionella Antigen, Urine - Urine, Urine, Clean Catch [[id number]]  (Normal) Collected:  01/10/20 1713    Specimen:  Urine, Clean Catch Updated:  01/11/20 0910     LEGIONELLA ANTIGEN, URINE Negative    S. Pneumo Ag Urine or CSF - Urine, Urine, Clean Catch [[id number]]  (Normal) Collected:  01/10/20 1713    Specimen:  Urine, Clean Catch Updated:  01/11/20 0910     Strep Pneumo Ag Negative    Basic Metabolic Panel [[id number]]  (Abnormal) Collected:  01/11/20 0359    Specimen:  Blood Updated:  01/11/20 0439     Glucose 145 mg/dL      BUN 9 mg/dL      Creatinine 0.40 mg/dL      Sodium 129 mmol/L      Potassium 3.7 mmol/L      Chloride 84 mmol/L      CO2 34.0 mmol/L      Calcium 8.8 mg/dL      eGFR Non African Amer >150 mL/min/1.73      BUN/Creatinine Ratio 22.5     Anion Gap 11.0 mmol/L     Narrative:       GFR Normal >60  Chronic Kidney Disease <60  Kidney Failure <15      Phosphorus [[id number]]  (Normal) Collected:  01/11/20 0359    Specimen:  Blood Updated:  01/11/20 0439     Phosphorus 2.5 mg/dL     Magnesium [[id number]]  (Normal) Collected:  01/11/20 0359    Specimen:  Blood Updated:  01/11/20 0432     Magnesium 1.9 mg/dL     Lactic Acid, Plasma [[id number]]  (Normal) Collected:  01/11/20 0400    Specimen:  Blood Updated:  01/11/20 0432     Lactate 1.8 mmol/L     CBC & Differential [[id number]] Collected:  01/11/20 0400    Specimen:  Blood Updated:  01/11/20 0412    Narrative:       The following orders were created for panel order CBC & Differential.  Procedure                                Abnormality         Status                     ---------                               -----------         ------                     CBC Auto Differential[[id number]]        Abnormal            Final result                 Please view results for these tests on the individual orders.    CBC Auto Differential [[id number]]  (Abnormal) Collected:  01/11/20 0400    Specimen:  Blood Updated:  01/11/20 0412     WBC 9.20 10*3/mm3      RBC 3.55 10*6/mm3      Hemoglobin 9.6 g/dL      Hematocrit 29.0 %      MCV 81.8 fL      MCH 26.9 pg      MCHC 33.0 g/dL      RDW 16.3 %      RDW-SD 47.3 fl      MPV 6.5 fL      Platelets 437 10*3/mm3      Neutrophil % 81.6 %      Lymphocyte % 9.0 %      Monocyte % 8.1 %      Eosinophil % 1.1 %      Basophil % 0.2 %      Neutrophils, Absolute 7.50 10*3/mm3      Lymphocytes, Absolute 0.80 10*3/mm3      Monocytes, Absolute 0.70 10*3/mm3      Eosinophils, Absolute 0.10 10*3/mm3      Basophils, Absolute 0.00 10*3/mm3      nRBC 0.0 /100 WBC     Blood Culture - Blood, Blood, Venous Line [[id number]] Collected:  01/09/20 0050    Specimen:  Blood, Venous Line Updated:  01/11/20 0100     Blood Culture No growth at 2 days    Respiratory Panel, PCR - Swab, Nasopharynx [[id number]]  (Normal) Collected:  01/10/20 1634    Specimen:  Swab from Nasopharynx Updated:  01/10/20 1922     ADENOVIRUS, PCR Not Detected     Coronavirus 229E Not Detected     Coronavirus HKU1 Not Detected     Coronavirus NL63 Not Detected     Coronavirus OC43 Not Detected     Human Metapneumovirus Not Detected     Human Rhinovirus/Enterovirus Not Detected     Influenza B PCR Not Detected     Parainfluenza Virus 1 Not Detected     Parainfluenza Virus 2 Not Detected     Parainfluenza Virus 3 Not Detected     Parainfluenza Virus 4 Not Detected     Bordetella pertussis pcr Not Detected     Influenza A H1 2009 PCR Not Detected     Chlamydophila pneumoniae PCR Not Detected     Mycoplasma pneumo by PCR Not Detected     Influenza A  PCR Not Detected     Influenza A H3 Not Detected     Influenza A H1 Not Detected     RSV, PCR Not Detected    Procalcitonin [[id number]]  (Normal) Collected:  01/09/20 1416    Specimen:  Blood Updated:  01/10/20 1336     Procalcitonin 0.11 ng/mL     Narrative:       Results may be falsely decreased if patient taking Biotin.         No results found for: HGBA1C        Results from last 7 days   Lab Units 01/08/20  2348   PH, ARTERIAL pH units 7.495*   PO2 ART mm Hg 76.8*   PCO2, ARTERIAL mm Hg 43.4   HCO3 ART mmol/L 33.5*     Lab Results   Component Value Date    LIPASE 28 08/04/2018     Lab Results   Component Value Date    CHOL 185 01/11/2018    TRIG 78 01/11/2018    HDL 53 01/11/2018     (H) 01/11/2018       Lab Results   Lab Value Date/Time    FINALDX  01/17/2019 1200     1. Tongue, Right, Biopsy: Squamous mucosa and submucosa with    A. 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   B. Epithelial erosion with acute inflammation with surface Candidiasis.    [facility]/jse         FINALDX  07/10/2018 1100     1.  RIGHT TONGUE, BIOPSY:              POLYPOID GRANULATION TISSUE WITH ACUTE AND CHRONIC INFLAMMATION, ULCERATION, AND                     ADJACENT REACTIVE SQUAMOUS EPITHELIUM.    [facility]/  [facility]/a/CMK    CPT CODES:  1. 35705               Microbiology Results (last 10 days)     Procedure Component Value - Date/Time    Legionella Antigen, Urine - Urine, Urine, Clean Catch [[id number]]  (Normal) Collected:  01/10/20 1713    Lab Status:  Final result Specimen:  Urine, Clean Catch Updated:  01/11/20 0910     LEGIONELLA ANTIGEN, URINE Negative    S. Pneumo Ag Urine or CSF - Urine, Urine, Clean Catch [[id number]]  (Normal) Collected:  01/10/20 1713    Lab Status:  Final result Specimen:  Urine, Clean Catch Updated:  01/11/20 0910     Strep Pneumo Ag Negative    Respiratory Panel, PCR - Swab, Nasopharynx [[id number]]  (Normal) Collected:  01/10/20 1634    Lab Status:  Final result Specimen:   Swab from Nasopharynx Updated:  01/10/20 1922     ADENOVIRUS, PCR Not Detected     Coronavirus 229E Not Detected     Coronavirus HKU1 Not Detected     Coronavirus NL63 Not Detected     Coronavirus OC43 Not Detected     Human Metapneumovirus Not Detected     Human Rhinovirus/Enterovirus Not Detected     Influenza B PCR Not Detected     Parainfluenza Virus 1 Not Detected     Parainfluenza Virus 2 Not Detected     Parainfluenza Virus 3 Not Detected     Parainfluenza Virus 4 Not Detected     Bordetella pertussis pcr Not Detected     Influenza A H1 2009 PCR Not Detected     Chlamydophila pneumoniae PCR Not Detected     Mycoplasma pneumo by PCR Not Detected     Influenza A PCR Not Detected     Influenza A H3 Not Detected     Influenza A H1 Not Detected     RSV, PCR Not Detected    Respiratory Culture - Sputum, Throat [[id number]] Collected:  01/10/20 1452    Lab Status:  Preliminary result Specimen:  Sputum from Throat Updated:  01/11/20 1013     Respiratory Culture Growth present, too young to evaluate     Gram Stain Many (4+) WBCs seen      Few (2+) Epithelial cells seen    Blood Culture - Blood, Blood, Venous Line [[id number]] Collected:  01/09/20 0050    Lab Status:  Preliminary result Specimen:  Blood, Venous Line Updated:  01/11/20 0100     Blood Culture No growth at 2 days          ECG/EMG Results (most recent)     None                    Xr Chest 1 View    Result Date: 1/9/2020   1. Right medial basilar infiltrate. Correlate clinically for pneumonia. Follow-up evaluation recommended to document resolution.  Electronically Signed By-Fabián Singleton On:1/9/2020 8:05 AM This report was finalized on [id number] by  Fabián Singleton, .          Xrays, labs reviewed personally by physician.    Medication Review:   I have reviewed the patient's current medication list      Scheduled Meds    [START ON 1/13/2020] !Vancomycin Level Draw Needed  Does not apply Once   [START ON 1/13/2020] !Vancomycin Level Draw Needed  Does not apply  Once   acetylcysteine 2 mL Nebulization BID   amLODIPine 5 mg Oral QAM   anastrozole 1 mg Oral Daily   cefepime 2 g Intravenous Q8H   enoxaparin 40 mg Subcutaneous Q24H   fentaNYL 1 patch Transdermal Q72H   gabapentin 600 mg Oral Q8H   guaiFENesin 600 mg Oral 4x Daily   ipratropium-albuterol 3 mL Nebulization Q6H While Awake - RT   levothyroxine 175 mcg Oral Q AM   lisinopril 40 mg Oral QAM   metoclopramide 5 mg Intravenous 4x Daily AC & at Bedtime   pantoprazole 40 mg Oral Q AM   saccharomyces boulardii 250 mg Oral Daily   vancomycin 1,250 mg Intravenous Q18H       Meds Infusions    Pharmacy to dose vancomycin     sodium chloride 75 mL/hr Last Rate: 75 mL/hr (01/09/20 0359)       Meds PRN  ipratropium-albuterol  •  LORazepam  •  oxyCODONE  •  Pharmacy to dose vancomycin  •  [COMPLETED] Insert peripheral IV **AND** sodium chloride        Assessment/Plan   Assessment/Plan     Active Hospital Problems    Diagnosis  POA   • **Dyspnea [R06.00]  Yes   • Hypothyroidism [E03.9]  Yes   • Pneumonia due to infectious organism [J18.9]  Yes   • History of right breast cancer [Z85.3]  Not Applicable   • Chronic GERD [K21.9]  Yes   • Hypertension [I10]  Yes   • Neck abscess [L02.11]  Yes   • Tongue cancer (CMS/HCC) [C02.9]  Yes      Resolved Hospital Problems   No resolved problems to display.       MEDICAL DECISION MAKING COMPLEXITY BY PROBLEM:     Right lower lobe pneumonia  -Respiratory virus panel negative  -Sputum culture showed respiratory betsy  -Zithromax and Rocephin were changed to cefepime and vancomycin   -monitor cultures     Tongue cancer status post resection, status post recurrence, status post bilateral neck resections and trach and feeding tube placement; status post radiation, chemo and immunotherapy.  Patient just finished a 5-day course of radiation treatment to the right neck on 1/8/2020 and has an eschar on her right neck at the site of the tumor recurrence.    Leukocytosis, likely secondary to above,  resolved     Chronic anemia, hemoglobin 9.6 up from 9.0 on admission: Monitor     Intermittent vomiting, without associated nausea, resolved  - Nutrition consulted and tube feeding with water flushes ordered and tolerated by patient currently  -Continue Reglan 5 mg IV 4 times daily     Hyponatremia, moderate, sodium 126  -likely secondary to free water with tube feeding  -Improved to 129 with decreased free water; gentle IV fluids normal saline   -Monitor BMP     Chronic pain secondary to surgery and neck cancer  -Continue Roxicodone, Duragesic patch, and Neurontin     Essential hypertension, chronic and controlled: Continue amlodipine, lisinopril     Hormone suppression therapy: Continue Arimidex     Gastric reflux: continue pepcid      Anxiety, chronic: continue ativan     Hypothyroidism, chronic: TSH 2.1; continue Synthroid    VTE Prophylaxis - Lovenox 40 mg SC daily.      Code Status -   Code Status and Medical Interventions:   Ordered at: 01/09/20 0226     Code Status:    CPR     Medical Interventions (Level of Support Prior to Arrest):    Full       Discharge Planning          Destination      Coordination has not been started for this encounter.      Durable Medical Equipment      Coordination has not been started for this encounter.      Dialysis/Infusion      Coordination has not been started for this encounter.      Home Medical Care      Service Provider Request Status Selected Services Address Phone Number Fax Number    [facility] Accepted N/A [address] [place] [address] [phone]      Therapy      Coordination has not been started for this encounter.      Community Resources      Coordination has not been started for this encounter.            Electronically signed by Viola Nguyen MD, 01/11/20, 1:14 PM.  [facility] Hospitalist Team

## 2020-01-12 LAB
ANION GAP SERPL CALCULATED.3IONS-SCNC: 11 MMOL/L (ref 5–15)
BACTERIA SPEC RESP CULT: NORMAL
BUN BLD-MCNC: 9 MG/DL (ref 8–23)
BUN/CREAT SERPL: 25.7 (ref 7–25)
CALCIUM SPEC-SCNC: 8.5 MG/DL (ref 8.6–10.5)
CHLORIDE SERPL-SCNC: 84 MMOL/L (ref 98–107)
CO2 SERPL-SCNC: 34 MMOL/L (ref 22–29)
CREAT BLD-MCNC: 0.35 MG/DL (ref 0.57–1)
GFR SERPL CREATININE-BSD FRML MDRD: >150 ML/MIN/1.73
GLUCOSE BLD-MCNC: 129 MG/DL (ref 65–99)
GRAM STN SPEC: NORMAL
GRAM STN SPEC: NORMAL
MAGNESIUM SERPL-MCNC: 1.8 MG/DL (ref 1.6–2.4)
PHOSPHATE SERPL-MCNC: 2.7 MG/DL (ref 2.5–4.5)
POTASSIUM BLD-SCNC: 3.8 MMOL/L (ref 3.5–5.2)
SODIUM BLD-SCNC: 129 MMOL/L (ref 136–145)

## 2020-01-12 PROCEDURE — 83735 ASSAY OF MAGNESIUM: CPT

## 2020-01-12 PROCEDURE — 84100 ASSAY OF PHOSPHORUS: CPT

## 2020-01-12 PROCEDURE — 80048 BASIC METABOLIC PNL TOTAL CA: CPT

## 2020-01-12 PROCEDURE — 25010000002 METOCLOPRAMIDE PER 10 MG: Performed by: NURSE PRACTITIONER

## 2020-01-12 PROCEDURE — 94799 UNLISTED PULMONARY SVC/PX: CPT

## 2020-01-12 PROCEDURE — 25010000002 VANCOMYCIN 10 G RECONSTITUTED SOLUTION: Performed by: HOSPITALIST

## 2020-01-12 PROCEDURE — 25010000002 ENOXAPARIN PER 10 MG: Performed by: NURSE PRACTITIONER

## 2020-01-12 PROCEDURE — 25010000002 CEFEPIME PER 500 MG: Performed by: NURSE PRACTITIONER

## 2020-01-12 RX ADMIN — IPRATROPIUM BROMIDE AND ALBUTEROL SULFATE 3 ML: .5; 3 SOLUTION RESPIRATORY (INHALATION) at 11:47

## 2020-01-12 RX ADMIN — GUAIFENESIN 600 MG: 100 SOLUTION ORAL at 11:26

## 2020-01-12 RX ADMIN — LORAZEPAM 0.5 MG: 0.5 TABLET ORAL at 21:58

## 2020-01-12 RX ADMIN — GUAIFENESIN 600 MG: 100 SOLUTION ORAL at 21:58

## 2020-01-12 RX ADMIN — SODIUM CHLORIDE 75 ML/HR: 900 INJECTION, SOLUTION INTRAVENOUS at 11:27

## 2020-01-12 RX ADMIN — OXYCODONE HYDROCHLORIDE 10 MG: 5 TABLET ORAL at 04:13

## 2020-01-12 RX ADMIN — METOCLOPRAMIDE 5 MG: 5 INJECTION, SOLUTION INTRAMUSCULAR; INTRAVENOUS at 08:18

## 2020-01-12 RX ADMIN — OXYCODONE HYDROCHLORIDE 10 MG: 5 TABLET ORAL at 21:58

## 2020-01-12 RX ADMIN — GABAPENTIN 600 MG: 300 CAPSULE ORAL at 21:58

## 2020-01-12 RX ADMIN — VANCOMYCIN HYDROCHLORIDE 1250 MG: 10 INJECTION, POWDER, LYOPHILIZED, FOR SOLUTION INTRAVENOUS at 23:05

## 2020-01-12 RX ADMIN — CEFEPIME 2 G: 2 INJECTION, POWDER, FOR SOLUTION INTRAVENOUS at 06:10

## 2020-01-12 RX ADMIN — LEVOTHYROXINE SODIUM 175 MCG: 175 TABLET ORAL at 06:10

## 2020-01-12 RX ADMIN — METOCLOPRAMIDE 5 MG: 5 INJECTION, SOLUTION INTRAMUSCULAR; INTRAVENOUS at 11:26

## 2020-01-12 RX ADMIN — METOCLOPRAMIDE 5 MG: 5 INJECTION, SOLUTION INTRAMUSCULAR; INTRAVENOUS at 17:22

## 2020-01-12 RX ADMIN — LISINOPRIL 40 MG: 20 TABLET ORAL at 06:10

## 2020-01-12 RX ADMIN — IPRATROPIUM BROMIDE AND ALBUTEROL SULFATE 3 ML: .5; 3 SOLUTION RESPIRATORY (INHALATION) at 07:28

## 2020-01-12 RX ADMIN — GUAIFENESIN 600 MG: 100 SOLUTION ORAL at 08:19

## 2020-01-12 RX ADMIN — OXYCODONE HYDROCHLORIDE 10 MG: 5 TABLET ORAL at 13:30

## 2020-01-12 RX ADMIN — ACETYLCYSTEINE 2 ML: 200 SOLUTION ORAL; RESPIRATORY (INHALATION) at 07:29

## 2020-01-12 RX ADMIN — PANTOPRAZOLE SODIUM 40 MG: 40 TABLET, DELAYED RELEASE ORAL at 06:10

## 2020-01-12 RX ADMIN — CEFEPIME 2 G: 2 INJECTION, POWDER, FOR SOLUTION INTRAVENOUS at 13:23

## 2020-01-12 RX ADMIN — AMLODIPINE BESYLATE 5 MG: 5 TABLET ORAL at 06:10

## 2020-01-12 RX ADMIN — GABAPENTIN 600 MG: 300 CAPSULE ORAL at 13:23

## 2020-01-12 RX ADMIN — Medication 250 MG: at 08:18

## 2020-01-12 RX ADMIN — ENOXAPARIN SODIUM 40 MG: 40 INJECTION SUBCUTANEOUS at 15:23

## 2020-01-12 RX ADMIN — IPRATROPIUM BROMIDE AND ALBUTEROL SULFATE 3 ML: .5; 3 SOLUTION RESPIRATORY (INHALATION) at 19:34

## 2020-01-12 RX ADMIN — METOCLOPRAMIDE 5 MG: 5 INJECTION, SOLUTION INTRAMUSCULAR; INTRAVENOUS at 21:58

## 2020-01-12 RX ADMIN — ANASTROZOLE 1 MG: 1 TABLET ORAL at 08:18

## 2020-01-12 RX ADMIN — VANCOMYCIN HYDROCHLORIDE 1250 MG: 10 INJECTION, POWDER, LYOPHILIZED, FOR SOLUTION INTRAVENOUS at 04:13

## 2020-01-12 RX ADMIN — GUAIFENESIN 600 MG: 100 SOLUTION ORAL at 17:22

## 2020-01-12 RX ADMIN — CEFEPIME 2 G: 2 INJECTION, POWDER, FOR SOLUTION INTRAVENOUS at 21:54

## 2020-01-12 RX ADMIN — FENTANYL 1 PATCH: 50 PATCH, EXTENDED RELEASE TRANSDERMAL at 08:19

## 2020-01-12 RX ADMIN — GABAPENTIN 600 MG: 300 CAPSULE ORAL at 06:10

## 2020-01-12 RX ADMIN — Medication 10 ML: at 21:58

## 2020-01-12 RX ADMIN — ACETYLCYSTEINE 2 ML: 200 SOLUTION ORAL; RESPIRATORY (INHALATION) at 19:34

## 2020-01-12 NOTE — PROGRESS NOTES
"KPA/PULM/CC PROGRESS NOTE     History of present illness  The patient is a 69-year-old who presented to the emergency department for evaluation of shortness of air and hypoxia.  The following information has been supplemented by review of documentation and interview with the patient's niece at the bedside as the patient is nonverbal with a tracheostomy in place making communication difficult.  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  She completed a 5-day cycle of chemotherapy on 1/8/2020 in [facility].  Over the past 2 days the patient has had progressive worsening of shortness of air.  She reports that she has had a minimal amount of clear and yellow expectoration, subjective fever and chills, emesis, diarrhea for the past 3 to 4 days.  She receives intermittent tube feeds via PEG and reports that the emesis looked like tube feeds and she did not have nausea prior to the events.  Her family reported that at home her room air saturation dropped to the 70s and she presented to the emergency department for further evaluation.     In the emergency department chest x-ray revealed right lower lobe filtrate consistent with pneumonia.  WBCs 11.80, lactate was not performed.  ABG on 40% trach collar: 7.4 9/43/76/30 3/96%.  The patient was admitted for further evaluation and treatment of pneumonia and hypoxic respiratory failure.      SUBJECTIVE    1/11: Patient reports feeling somewhat better today.  Shortness of air has improved.  She denies chest pain.  Tolerating O2 at 40% via trach mask.  Tolerating tube feeds  1/12 no SOA/CP     OBJECTIVE    /75 (BP Location: Left arm, Patient Position: Lying)   Pulse 92   Temp 97.4 °F (36.3 °C) (Axillary)   Resp 18   Ht 162.6 cm (64\")   Wt 64.3 kg (141 lb 12.1 oz)   LMP  (LMP Unknown)   SpO2 93%   Breastfeeding No   " BMI 24.33 kg/m²   Intake/Output last 3 shifts:  I/O last 3 completed shifts:  In: 130 [Other:130]  Out: 2400 [Urine:2400]  Intake/Output this shift:  I/O this shift:  In: -   Out: 150 [Urine:150]    Intake/Output Summary (Last 24 hours) at 1/12/2020 1130  Last data filed at 1/12/2020 0702  Gross per 24 hour   Intake 60 ml   Output 1650 ml   Net -1590 ml       Constitutional:  Well developed, well nourished, no acute distress, chronically ill-appearing  Eyes:  PERRL, conjunctiva normal   HENT:  Atraumatic, external ears normal, nose normal, tongue prosthesis in place  Neck-midline tracheostomy.  Right lateral neck wound, dry with no drainage  Respiratory:  No respiratory distress, scattered rhonchi and mild wheezing R>L, coarse bases, no rales  Cardiovascular: Regular rate, systolic murmur, no gallops, no rubs   GI:  Soft, nondistended, normal bowel sounds, nontender, left upper quadrant feeding tube, no rebound, no guarding   : Deferred  Musculoskeletal:  No edema, no tenderness, no deformities  Integument:  Well hydrated, no rash.  Right neck wound  Neurologic:  Alert & appropriate.  Unable to establish orientation, patient is nonverbal however she does seem to communicate appropriately.  No lateralizing deficits  Psychiatric: Calm    Scheduled Meds:    [START ON 1/13/2020] !Vancomycin Level Draw Needed  Does not apply Once   [START ON 1/13/2020] !Vancomycin Level Draw Needed  Does not apply Once   acetylcysteine 2 mL Nebulization BID   amLODIPine 5 mg Oral QAM   anastrozole 1 mg Oral Daily   cefepime 2 g Intravenous Q8H   enoxaparin 40 mg Subcutaneous Q24H   fentaNYL 1 patch Transdermal Q72H   gabapentin 600 mg Oral Q8H   guaiFENesin 600 mg Oral 4x Daily   ipratropium-albuterol 3 mL Nebulization Q6H While Awake - RT   levothyroxine 175 mcg Oral Q AM   lisinopril 40 mg Oral QAM   metoclopramide 5 mg Intravenous 4x Daily AC & at Bedtime   pantoprazole 40 mg Oral Q AM   saccharomyces boulardii 250 mg Oral Daily      vancomycin 1,250 mg Intravenous Q18H       Continuous Infusions:    Pharmacy to dose vancomycin     sodium chloride 75 mL/hr Last Rate: 75 mL/hr (01/09/20 0359)       PRN Meds:ipratropium-albuterol  •  LORazepam  •  oxyCODONE  •  Pharmacy to dose vancomycin  •  [COMPLETED] Insert peripheral IV **AND** sodium chloride     Data Review:  Lab Results (last 24 hours)     Procedure Component Value Units Date/Time    Basic Metabolic Panel [[id number]]  (Abnormal) Collected:  01/12/20 0423    Specimen:  Blood Updated:  01/12/20 0911     Glucose 129 mg/dL      BUN 9 mg/dL      Creatinine 0.35 mg/dL      Sodium 129 mmol/L      Potassium 3.8 mmol/L      Chloride 84 mmol/L      CO2 34.0 mmol/L      Calcium 8.5 mg/dL      eGFR Non African Amer >150 mL/min/1.73      BUN/Creatinine Ratio 25.7     Anion Gap 11.0 mmol/L     Narrative:       GFR Normal >60  Chronic Kidney Disease <60  Kidney Failure <15      Respiratory Culture - Sputum, Throat [[id number]] Collected:  01/10/20 1452    Specimen:  Sputum from Throat Updated:  01/12/20 0654     Respiratory Culture Scant growth (1+) Normal Respiratory Tori     Gram Stain Many (4+) WBCs seen      Few (2+) Epithelial cells seen    Magnesium [[id number]]  (Normal) Collected:  01/12/20 0423    Specimen:  Blood Updated:  01/12/20 0512     Magnesium 1.8 mg/dL     Phosphorus [[id number]]  (Normal) Collected:  01/12/20 0423    Specimen:  Blood Updated:  01/12/20 0512     Phosphorus 2.7 mg/dL     Blood Culture - Blood, Blood, Venous Line [[id number]] Collected:  01/09/20 0050    Specimen:  Blood, Venous Line Updated:  01/12/20 0100     Blood Culture No growth at 3 days             Imaging:  Imaging Results (Last 72 Hours)     ** No results found for the last 72 hours. **          ASSESSMENT    Healthcare acquired pneumonia, present on admission.  Patient has been undergoing chemotherapy  Acute hypoxemic respiratory failure secondary to pneumonia  Vomiting, currently resolved  Hyponatremia  Oral  cancer status post bilateral radical neck dissection and subsequent recurrence of right neck mass.  Status post radiation and chemotherapy  History of breast cancer  Valvular heart disease with known murmur        PLAN    Titrate O2 to maintain a saturation of 91%.  Currently on 5 liters   Broad-spectrum antibiotic coverage to cefepime and vancomycin  Sputum culture via tracheal suction negative so far.   Procalcitonin low  Urine antigens for Legionella and strep pneumo negative  Respiratory viral panel negative  Blood cultures pending  Continue mucolytic  DuoNeb scheduled and PRN  Mucomyst nebs     Chronic conditions managed per primary

## 2020-01-12 NOTE — PLAN OF CARE
Patient states she is feeling better. Still has pain where the abscess on neck is. Still getting IV antibiotics. Will continue to monitor.

## 2020-01-12 NOTE — PROGRESS NOTES
Nutrition Services    Patient Name:  Elma Sheikh  YOB: 1950  MRN: [id number]  Admit Date:  1/8/2020    Progress note:    TF orders: Isosource 1.5 @ 60 ml/hr + 10 ml q 4 hrs water flush  Correct Formula charted at goal rate. Residuals WNL. No further N/V. 1/12 + BM x 2.     Labs: Gluc 129H, Na 129L, K+ 3.8, Crt 0.35 L, BUN 9, Ca 8.5 L, Phos 2.7, Mg 1.8, Hgb 9.6 L, Hct 29.0 L     Hyponatremia Stable. Will continue to monitor.     Electronically signed by:  Fide South RD  01/12/20 1:36 PM

## 2020-01-13 LAB
ANION GAP SERPL CALCULATED.3IONS-SCNC: 8 MMOL/L (ref 5–15)
BUN BLD-MCNC: 12 MG/DL (ref 8–23)
BUN/CREAT SERPL: 36.4 (ref 7–25)
CALCIUM SPEC-SCNC: 8.3 MG/DL (ref 8.6–10.5)
CHLORIDE SERPL-SCNC: 88 MMOL/L (ref 98–107)
CO2 SERPL-SCNC: 34 MMOL/L (ref 22–29)
CREAT BLD-MCNC: 0.33 MG/DL (ref 0.57–1)
GFR SERPL CREATININE-BSD FRML MDRD: >150 ML/MIN/1.73
GLUCOSE BLD-MCNC: 131 MG/DL (ref 65–99)
MAGNESIUM SERPL-MCNC: 1.9 MG/DL (ref 1.6–2.4)
PHOSPHATE SERPL-MCNC: 2.7 MG/DL (ref 2.5–4.5)
POTASSIUM BLD-SCNC: 3.9 MMOL/L (ref 3.5–5.2)
SODIUM BLD-SCNC: 130 MMOL/L (ref 136–145)
VANCOMYCIN PEAK SERPL-MCNC: 20.3 MCG/ML (ref 20–40)
VANCOMYCIN TROUGH SERPL-MCNC: 5 MCG/ML (ref 5–20)

## 2020-01-13 PROCEDURE — 25010000002 ENOXAPARIN PER 10 MG: Performed by: NURSE PRACTITIONER

## 2020-01-13 PROCEDURE — 25010000002 CEFEPIME PER 500 MG: Performed by: INTERNAL MEDICINE

## 2020-01-13 PROCEDURE — 25010000002 METOCLOPRAMIDE PER 10 MG: Performed by: NURSE PRACTITIONER

## 2020-01-13 PROCEDURE — 25010000002 CEFEPIME PER 500 MG: Performed by: NURSE PRACTITIONER

## 2020-01-13 PROCEDURE — 99232 SBSQ HOSP IP/OBS MODERATE 35: CPT | Performed by: INTERNAL MEDICINE

## 2020-01-13 PROCEDURE — 84100 ASSAY OF PHOSPHORUS: CPT

## 2020-01-13 PROCEDURE — 80202 ASSAY OF VANCOMYCIN: CPT | Performed by: HOSPITALIST

## 2020-01-13 PROCEDURE — 94799 UNLISTED PULMONARY SVC/PX: CPT

## 2020-01-13 PROCEDURE — 80048 BASIC METABOLIC PNL TOTAL CA: CPT

## 2020-01-13 PROCEDURE — 83735 ASSAY OF MAGNESIUM: CPT

## 2020-01-13 PROCEDURE — 25010000002 VANCOMYCIN 10 G RECONSTITUTED SOLUTION: Performed by: INTERNAL MEDICINE

## 2020-01-13 RX ADMIN — CEFEPIME 2 G: 2 INJECTION, POWDER, FOR SOLUTION INTRAVENOUS at 22:00

## 2020-01-13 RX ADMIN — METOCLOPRAMIDE 5 MG: 5 INJECTION, SOLUTION INTRAMUSCULAR; INTRAVENOUS at 16:40

## 2020-01-13 RX ADMIN — METOCLOPRAMIDE 5 MG: 5 INJECTION, SOLUTION INTRAMUSCULAR; INTRAVENOUS at 22:00

## 2020-01-13 RX ADMIN — GUAIFENESIN 600 MG: 100 SOLUTION ORAL at 09:44

## 2020-01-13 RX ADMIN — OXYCODONE HYDROCHLORIDE 10 MG: 5 TABLET ORAL at 06:10

## 2020-01-13 RX ADMIN — Medication 10 ML: at 22:00

## 2020-01-13 RX ADMIN — GUAIFENESIN 600 MG: 100 SOLUTION ORAL at 14:35

## 2020-01-13 RX ADMIN — METOCLOPRAMIDE 5 MG: 5 INJECTION, SOLUTION INTRAMUSCULAR; INTRAVENOUS at 14:35

## 2020-01-13 RX ADMIN — IPRATROPIUM BROMIDE AND ALBUTEROL SULFATE 3 ML: .5; 3 SOLUTION RESPIRATORY (INHALATION) at 20:27

## 2020-01-13 RX ADMIN — VANCOMYCIN HYDROCHLORIDE 1250 MG: 10 INJECTION, POWDER, LYOPHILIZED, FOR SOLUTION INTRAVENOUS at 19:12

## 2020-01-13 RX ADMIN — AMLODIPINE BESYLATE 5 MG: 5 TABLET ORAL at 06:11

## 2020-01-13 RX ADMIN — LISINOPRIL 40 MG: 20 TABLET ORAL at 06:11

## 2020-01-13 RX ADMIN — CEFEPIME 2 G: 2 INJECTION, POWDER, FOR SOLUTION INTRAVENOUS at 06:11

## 2020-01-13 RX ADMIN — IPRATROPIUM BROMIDE AND ALBUTEROL SULFATE 3 ML: .5; 3 SOLUTION RESPIRATORY (INHALATION) at 06:29

## 2020-01-13 RX ADMIN — Medication: at 03:48

## 2020-01-13 RX ADMIN — LORAZEPAM 0.5 MG: 0.5 TABLET ORAL at 22:00

## 2020-01-13 RX ADMIN — CEFEPIME 2 G: 2 INJECTION, POWDER, FOR SOLUTION INTRAVENOUS at 14:35

## 2020-01-13 RX ADMIN — GUAIFENESIN 600 MG: 100 SOLUTION ORAL at 16:40

## 2020-01-13 RX ADMIN — Medication 250 MG: at 09:43

## 2020-01-13 RX ADMIN — Medication: at 16:22

## 2020-01-13 RX ADMIN — IPRATROPIUM BROMIDE AND ALBUTEROL SULFATE 3 ML: .5; 3 SOLUTION RESPIRATORY (INHALATION) at 11:15

## 2020-01-13 RX ADMIN — GUAIFENESIN 600 MG: 100 SOLUTION ORAL at 22:00

## 2020-01-13 RX ADMIN — METOCLOPRAMIDE 5 MG: 5 INJECTION, SOLUTION INTRAMUSCULAR; INTRAVENOUS at 09:45

## 2020-01-13 RX ADMIN — LEVOTHYROXINE SODIUM 175 MCG: 175 TABLET ORAL at 06:11

## 2020-01-13 RX ADMIN — GABAPENTIN 600 MG: 300 CAPSULE ORAL at 06:11

## 2020-01-13 RX ADMIN — OXYCODONE HYDROCHLORIDE 10 MG: 5 TABLET ORAL at 16:41

## 2020-01-13 RX ADMIN — ACETYLCYSTEINE 2 ML: 200 SOLUTION ORAL; RESPIRATORY (INHALATION) at 20:27

## 2020-01-13 RX ADMIN — ENOXAPARIN SODIUM 40 MG: 40 INJECTION SUBCUTANEOUS at 16:40

## 2020-01-13 RX ADMIN — GABAPENTIN 600 MG: 300 CAPSULE ORAL at 22:00

## 2020-01-13 RX ADMIN — OXYCODONE HYDROCHLORIDE 10 MG: 5 TABLET ORAL at 22:00

## 2020-01-13 RX ADMIN — LORAZEPAM 0.5 MG: 0.5 TABLET ORAL at 06:10

## 2020-01-13 RX ADMIN — GABAPENTIN 600 MG: 300 CAPSULE ORAL at 14:35

## 2020-01-13 RX ADMIN — ACETYLCYSTEINE 4 ML: 200 SOLUTION ORAL; RESPIRATORY (INHALATION) at 06:29

## 2020-01-13 RX ADMIN — SODIUM CHLORIDE 75 ML/HR: 900 INJECTION, SOLUTION INTRAVENOUS at 06:13

## 2020-01-13 RX ADMIN — ANASTROZOLE 1 MG: 1 TABLET ORAL at 09:43

## 2020-01-13 NOTE — PROGRESS NOTES
"[facility]/PULM/CC PROGRESS NOTE     History of present illness  The patient is a 69-year-old who presented to the emergency department for evaluation of shortness of air and hypoxia.  The following information has been supplemented by review of documentation and interview with the patient's niece at the bedside as the patient is nonverbal with a tracheostomy in place making communication difficult.  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  She completed a 5-day cycle of chemotherapy on 1/8/2020 in [facility].  Over the past 2 days the patient has had progressive worsening of shortness of air.  She reports that she has had a minimal amount of clear and yellow expectoration, subjective fever and chills, emesis, diarrhea for the past 3 to 4 days.  She receives intermittent tube feeds via PEG and reports that the emesis looked like tube feeds and she did not have nausea prior to the events.  Her family reported that at home her room air saturation dropped to the 70s and she presented to the emergency department for further evaluation.     In the emergency department chest x-ray revealed right lower lobe filtrate consistent with pneumonia.  WBCs 11.80, lactate was not performed.  ABG on 40% trach collar: 7.4 9/43/76/30 3/96%.  The patient was admitted for further evaluation and treatment of pneumonia and hypoxic respiratory failure.      SUBJECTIVE    1/11: Patient reports feeling somewhat better today.  Shortness of air has improved.  She denies chest pain.  Tolerating O2 at 40% via trach mask.  Tolerating tube feeds  1/12 no SOA/CP   1/13: No acute changes reported.  Tolerating 5 L O2    OBJECTIVE    /76 (BP Location: Left arm, Patient Position: Lying)   Pulse 92   Temp 98.9 °F (37.2 °C) (Oral)   Resp 17   Ht 162.6 cm (64\")   Wt 64.1 kg (141 lb 5 oz)   LMP  (LMP " Unknown)   SpO2 97%   Breastfeeding No   BMI 24.26 kg/m²   Intake/Output last 3 shifts:  I/O last 3 completed shifts:  In: 100 [Other:100]  Out: 1750 [Urine:1750]  Intake/Output this shift:  I/O this shift:  In: 1401 [Other:10; NG/GT:391; IV Piggyback:1000]  Out: 700 [Urine:700]    Intake/Output Summary (Last 24 hours) at 1/13/2020 1633  Last data filed at 1/13/2020 1602  Gross per 24 hour   Intake 1441 ml   Output 700 ml   Net 741 ml       Constitutional:  Well developed, well nourished, no acute distress, chronically ill-appearing  Eyes:  PERRL, conjunctiva normal   HENT:  Atraumatic, external ears normal, nose normal, tongue prosthesis in place  Neck-midline tracheostomy.  Right lateral neck wound, dry with no drainage  Respiratory:  No respiratory distress, scattered rhonchi and mild wheezing R>L, coarse bases, no rales  Cardiovascular: Regular rate, systolic murmur, no gallops, no rubs   GI:  Soft, nondistended, normal bowel sounds, nontender, left upper quadrant feeding tube, no rebound, no guarding   : Deferred  Musculoskeletal:  No edema, no tenderness, no deformities  Integument:  Well hydrated, no rash.  Right neck wound  Neurologic:  Alert & appropriate.  Unable to establish orientation, patient is nonverbal however she does seem to communicate appropriately.  No lateralizing deficits  Psychiatric: Calm    Scheduled Meds:    acetylcysteine 2 mL Nebulization BID   amLODIPine 5 mg Oral QAM   anastrozole 1 mg Oral Daily   cefepime 2 g Intravenous Q8H   enoxaparin 40 mg Subcutaneous Q24H   fentaNYL 1 patch Transdermal Q72H   gabapentin 600 mg Oral Q8H   guaiFENesin 600 mg Oral 4x Daily   ipratropium-albuterol 3 mL Nebulization Q6H While Awake - RT   levothyroxine 175 mcg Oral Q AM   lisinopril 40 mg Oral QAM   metoclopramide 5 mg Intravenous 4x Daily AC & at Bedtime   pantoprazole 40 mg Oral Q AM   saccharomyces boulardii 250 mg Oral Daily   vancomycin 1,250 mg Intravenous Q18H       Continuous  Infusions:    Pharmacy to dose vancomycin     sodium chloride 75 mL/hr Last Rate: 75 mL/hr (01/13/20 0613)       PRN Meds:ipratropium-albuterol  •  LORazepam  •  oxyCODONE  •  Pharmacy to dose vancomycin  •  [COMPLETED] Insert peripheral IV **AND** sodium chloride     Data Review:  Lab Results (last 24 hours)     Procedure Component Value Units Date/Time    Vancomycin, Peak [[id number]]  (Normal) Collected:  01/13/20 0342    Specimen:  Blood Updated:  01/13/20 0526     Vancomycin Peak 20.30 mcg/mL     Magnesium [[id number]]  (Normal) Collected:  01/13/20 0342    Specimen:  Blood Updated:  01/13/20 0526     Magnesium 1.9 mg/dL     Phosphorus [[id number]]  (Normal) Collected:  01/13/20 0342    Specimen:  Blood Updated:  01/13/20 0526     Phosphorus 2.7 mg/dL     Basic Metabolic Panel [[id number]]  (Abnormal) Collected:  01/13/20 0342    Specimen:  Blood Updated:  01/13/20 0526     Glucose 131 mg/dL      BUN 12 mg/dL      Creatinine 0.33 mg/dL      Sodium 130 mmol/L      Potassium 3.9 mmol/L      Chloride 88 mmol/L      CO2 34.0 mmol/L      Calcium 8.3 mg/dL      eGFR Non African Amer >150 mL/min/1.73      BUN/Creatinine Ratio 36.4     Anion Gap 8.0 mmol/L     Narrative:       GFR Normal >60  Chronic Kidney Disease <60  Kidney Failure <15      Blood Culture - Blood, Blood, Venous Line [[id number]] Collected:  01/09/20 0050    Specimen:  Blood, Venous Line Updated:  01/13/20 0100     Blood Culture No growth at 4 days             Imaging:  Imaging Results (Last 72 Hours)     ** No results found for the last 72 hours. **          ASSESSMENT    Healthcare acquired pneumonia, present on admission.  Patient has been undergoing chemotherapy  Acute hypoxemic respiratory failure secondary to pneumonia  Vomiting, currently resolved  Hyponatremia  Oral cancer status post bilateral radical neck dissection and subsequent recurrence of right neck mass.  Status post radiation and chemotherapy  History of breast cancer  Valvular heart  disease with known murmur        PLAN    Titrate O2 to maintain a saturation of 91%.  Currently on 5 liters   Broad-spectrum antibiotic coverage to cefepime and vancomycin  Sputum culture via tracheal suction negative so far.   Procalcitonin low  Urine antigens for Legionella and strep pneumo negative  Respiratory viral panel negative  Blood cultures pending  Continue mucolytic  DuoNeb scheduled and PRN  Mucomyst nebs     Chronic conditions managed per primary    Attending physician statement:  Above note scribed by nurse practitioner for me and later reviewed for accuracy. I've examined the patient and reviewed all labs and images.   I have directly participated in the evaluation and management of this patient.  Jean Claude Sellers MD  Pulmonary and [facility]  [facility]

## 2020-01-13 NOTE — PROGRESS NOTES
"Nutrition Services    Patient Name:  Elma Sheikh  YOB: 1950  MRN: [id number]  Admit Date:  1/8/2020    Comments: Continue current tube feed regimen, will continue to follow to monitor tolerance.    TF orders: Isosource 1.5 @ 60 ml/hr + 10 ml q 4 hrs water flush.       Reason for Assessment                Reason for Assessment    Reason For Assessment Follow-up assessment    1/9: Tube Feeding Consult        Diagnosis H&P (from 1/6/20): At time of assessment only ED note available. Per old RD note: head & neck CA s/p glossectomy c reconstruction & radical neck dissection, s/p trach and PEG, HTN, breast CA c chemo/radiation, GERD    Current Problems: admit r/t SOA/AMS    -pneumonia    -Tongue cancer status post resection, status post recurrence, status post bilateral neck resections and trach and feeding tube placement; status post radiation, chemo and immunotherapy    -leukocytosis - resolved    -chronic anemia    -intermittent N/V - resolved    -hyponatremia          Nutrition/Diet History                Nutrition/Diet History    Typical Food/Fluid Intake    1/6: Visited pt this morning with pt's daughter/care taker present. Pt sleeping & unable to complete interview.    Daughter reports pt received her PEG (Feb 2019) after having her tongue/teeth completely removed & a neck dissection following tx for her cancer. During this time daughter reports the pt only lost about 5-7#. Post PEG pt has been maintaining her weight around 140# & has \"maybe even gained a little\". Pt is NPO & uses TF exclusively for nutrition.     Of note, pt had her tongue reconstructed using muscles from her thigh at [facility]. She is able to move it some, but still has other pending sx procedures to help with tongue function.     Pt has a new neck mass (which was very visible at visit). She is currently undergoing 5 total radiation txs & immunotherapy.    Acutely, pt has been experiencing N/V with there tube feedings.  " "Daughter has noticed with radiation increased secretions which may be contributing a factor. Also she feels the N/V may be a side effect of the immunotherapy.     Pt's home TF regimen: 5 cans of Isosource 1.5 + 500 ml of water 5x/day which provides 1875 kcals, 85 gm protein, and 3455 ml total water (955 ml free from TF + 2500 ml from flushes).In addition, she has been giving pt an electrolyte replacement solution recommended by her son \"who works at a gym\". --> Noted this is a large amount of water which could be contributing to her hyponatremia. However the hyponatremia could be from N/V.       Functional Status Normally patient is still independently ambulatory. Her daughter is her full time caregiver.        Food Allergies  NKFA        Factors Affecting Nutritional Intake  Head/Neck cancer, N/V          Anthropometrics             Anthropometrics    Height 64 inches      Weight 64.1 kg (141 lb (1/13/20)       Admit Weight    Admit Weight 64.3 kg (141 lb 12.1 oz)    (1/8/20)        Ideal Body Weight (IBW)    Ideal Body Weight (IBW) 120 lb     % Ideal Body Weight 118%        Usual Body Weight (UBW)    Usual Body Weight 140# per pt's daughter     % Usual Body Weight 101%     Weight Hx  125# (6/16/19)  134# (8/23/18)  130# (6/14/18)  137# (1/19/18)  139# (12/5/17)  140# (12/2/16)       Body Mass Index (BMI)    BMI (kg/m2) 24.26           Labs/Medications                Labs    Pertinent Lab Results Comments Gluc 131, Na 130 L, K+ 3.9, Crt 0.33 L, BUN 12, Ca 8.3 L, Phos 2.7, Mg 1.9        Medications    Pertinent Medications Comments Cefepime, Vancomycin, Neurontin, synthroid, Reglan, protonix, ativan prn, oxycodone prn          Physical Findings                Physical Findings    Overall Physical Appearance 1/13: continues to appear well-nourished    1/9: NFPE completed, no s/s of malnutrition. Pt still very well developed.        Edema  None documented per EMR        Gastrointestinal 1/13: Loose BM x1 " "(1/12)    1/6: No BM documented x 1 day. At baseline daughter reports pt is having daily BMs, loose in consistency.        Tubes PEG        Oral/Mouth Cavity Noted NPO & uses TF to meet 100% of needs        Skin Right throat abcess          Estimated/Assessed Needs              Calorie Requirements     Height Used for Calorie Calculations  64 inches     Weight Used for Calorie Calculations 64.3 kg      Formula chosen for Calorie Calculations  30 kcals/kg      Estimated Calorie Requirements (kcals/day) 1929 kcals              Protein Requirements    Weight Used For Protein Calculations 64.3 kg       Est Protein Requirement Amount (gms/kg) 1.5 gm/kg       Estimated Protein Requirements (gms/day) 96 gm          Fluid Requirements     Estimated Fluid Requirements (mL/day) 1929 ml (1 ml/kcal- adjust based on hydration status)            Fluid Deficit       Desired Sodium Level (mEq/L)      Desired Sodium Level (mEq/L)      Estimated Fluid Deficit Needs (L)           Nutrition Prescription Ordered                Nutrition Prescription PO    Current PO Diet  NPO     Supplement -        Nutrition Prescription EN    Enteral Route PEG      TF modular -     TF Delivery Method Continuous     Current Ordered TF  Isosource 1.5 at 60 mL/hr     Current Ordered Water flush  10 mL [id number] flush     TF Observation  Observed correct bag hanging in room, however TF pump showed \"feeding complete\" - Unable to find RN when on unit, multiple calls to RN extension on board and 2 C nursing unit with no answer        Nutrition Prescription TPN    TPN Route -     Current Ordered TPN Volume  -     Dextrose (gm/kcals)  -     Amino Acid (gm/kcals) -     Lipids (Concentration/ML/Frequency)  -         Evaluation of Received Nutrient/Fluid Intake                PO Evaluation    % PO Intake 0% - NPO/TF        EN Evaluation    TF Changes Recommend restarting tube feed at goal rate     TF Residual Charted WNL     TF Tolerance No s/s of intolerance at " this time     Average EN Delivered  -        TPN Evaluation    Total Number of Days on TPN  -           Clinical Course      Clinical Nutrition Course Details  -1/8 NPO since admit and on tube feedings         Problem/Interventions:                     Nutrition Diagnoses Problem 1      Problem 1   Inadequate oral intakes related to head/neck cancer requiring total glossectomy as evidenced by pt is NPO and s/p PEG requiring EN to meet nutritional needs.      Nutrition Diagnoses Problem 2      Problem 2            Intervention Goal                Intervention Goal    General Pt will tolerate EN at goal          Nutrition Intervention                Nutrition Intervention    RD/Tech Action 1/13: Continue current tube feed regimen at goal rate        1/6: Starting EN continuously, hesitant to start intermittent r/t recent N/V. Daughter is okay with this.    Educated daughter when she goes home that 1000 ml total of extra water would suffice for additional water (two 500 ml water bottles). Also to monitor urine color/odor to assess for s/s of overhydration vs dehydration.     Recommending discontinuing routine use of electrolyte solution as the tube feeding as 100% of RDAs for vitamins/minerals.          Nutrition Prescription                Nutrition Prescription PO      Diet  NPO     Supplement -         Nutrition Prescription EN    Enteral Prescription Isosource 1.5 @ 60 ml/hr x 22 hrs (allows time of for ADLs) + 10 ml q4hr water flush provides 1980 kcals (103%), 90 gm protein (94%), and 1063 ml total water (1003 ml free from TF and 60 ml flush).     Will adjust water flush based on hydration status.         Nutrition Prescription TPN    TPN Prescription -         Monitor/Evaluation                Monitor/Evaluation    Monitor TF tolerance, weight, labs, meds, skin, and BMs           Electronically signed by:  Kathe Ferrell RD  01/13/20 2:22 PM

## 2020-01-13 NOTE — PLAN OF CARE
Problem: Patient Care Overview  Goal: Plan of Care Review  Flowsheets (Taken 1/13/2020 0459)  Progress: improving  Plan of Care Reviewed With: patient  Note:   Patient rested comfortably in bed throughout the night.  Pain treated per MAR.  No new concerns at this time, will continue to monitor.

## 2020-01-14 VITALS
DIASTOLIC BLOOD PRESSURE: 65 MMHG | OXYGEN SATURATION: 93 % | HEART RATE: 99 BPM | RESPIRATION RATE: 17 BRPM | BODY MASS INDEX: 22.2 KG/M2 | TEMPERATURE: 98.3 F | WEIGHT: 130 LBS | SYSTOLIC BLOOD PRESSURE: 105 MMHG | HEIGHT: 64 IN

## 2020-01-14 LAB
ANION GAP SERPL CALCULATED.3IONS-SCNC: 9 MMOL/L (ref 5–15)
BACTERIA SPEC AEROBE CULT: NORMAL
BASOPHILS # BLD AUTO: 0 10*3/MM3 (ref 0–0.2)
BASOPHILS NFR BLD AUTO: 0.3 % (ref 0–1.5)
BUN BLD-MCNC: 13 MG/DL (ref 8–23)
BUN/CREAT SERPL: 40.6 (ref 7–25)
CALCIUM SPEC-SCNC: 8.7 MG/DL (ref 8.6–10.5)
CHLORIDE SERPL-SCNC: 85 MMOL/L (ref 98–107)
CO2 SERPL-SCNC: 35 MMOL/L (ref 22–29)
CREAT BLD-MCNC: 0.32 MG/DL (ref 0.57–1)
DEPRECATED RDW RBC AUTO: 45.9 FL (ref 37–54)
EOSINOPHIL # BLD AUTO: 0.1 10*3/MM3 (ref 0–0.4)
EOSINOPHIL NFR BLD AUTO: 2.3 % (ref 0.3–6.2)
ERYTHROCYTE [DISTWIDTH] IN BLOOD BY AUTOMATED COUNT: 16.2 % (ref 12.3–15.4)
GFR SERPL CREATININE-BSD FRML MDRD: >150 ML/MIN/1.73
GLUCOSE BLD-MCNC: 114 MG/DL (ref 65–99)
HCT VFR BLD AUTO: 26.4 % (ref 34–46.6)
HGB BLD-MCNC: 8.6 G/DL (ref 12–15.9)
LYMPHOCYTES # BLD AUTO: 0.5 10*3/MM3 (ref 0.7–3.1)
LYMPHOCYTES NFR BLD AUTO: 8.2 % (ref 19.6–45.3)
MAGNESIUM SERPL-MCNC: 1.9 MG/DL (ref 1.6–2.4)
MCH RBC QN AUTO: 26.2 PG (ref 26.6–33)
MCHC RBC AUTO-ENTMCNC: 32.6 G/DL (ref 31.5–35.7)
MCV RBC AUTO: 80.4 FL (ref 79–97)
MONOCYTES # BLD AUTO: 0.6 10*3/MM3 (ref 0.1–0.9)
MONOCYTES NFR BLD AUTO: 8.5 % (ref 5–12)
NEUTROPHILS # BLD AUTO: 5.3 10*3/MM3 (ref 1.7–7)
NEUTROPHILS NFR BLD AUTO: 80.7 % (ref 42.7–76)
NRBC BLD AUTO-RTO: 0.1 /100 WBC (ref 0–0.2)
PHOSPHATE SERPL-MCNC: 3 MG/DL (ref 2.5–4.5)
PLATELET # BLD AUTO: 388 10*3/MM3 (ref 140–450)
PMV BLD AUTO: 6.4 FL (ref 6–12)
POTASSIUM BLD-SCNC: 4 MMOL/L (ref 3.5–5.2)
RBC # BLD AUTO: 3.28 10*6/MM3 (ref 3.77–5.28)
SODIUM BLD-SCNC: 129 MMOL/L (ref 136–145)
WBC NRBC COR # BLD: 6.5 10*3/MM3 (ref 3.4–10.8)

## 2020-01-14 PROCEDURE — 94618 PULMONARY STRESS TESTING: CPT

## 2020-01-14 PROCEDURE — 25010000002 VANCOMYCIN 10 G RECONSTITUTED SOLUTION: Performed by: INTERNAL MEDICINE

## 2020-01-14 PROCEDURE — 84100 ASSAY OF PHOSPHORUS: CPT

## 2020-01-14 PROCEDURE — 83735 ASSAY OF MAGNESIUM: CPT

## 2020-01-14 PROCEDURE — 85025 COMPLETE CBC W/AUTO DIFF WBC: CPT | Performed by: INTERNAL MEDICINE

## 2020-01-14 PROCEDURE — 25010000002 METOCLOPRAMIDE PER 10 MG: Performed by: NURSE PRACTITIONER

## 2020-01-14 PROCEDURE — 25010000002 CEFEPIME PER 500 MG: Performed by: INTERNAL MEDICINE

## 2020-01-14 PROCEDURE — 94799 UNLISTED PULMONARY SVC/PX: CPT

## 2020-01-14 PROCEDURE — 80048 BASIC METABOLIC PNL TOTAL CA: CPT

## 2020-01-14 PROCEDURE — 99239 HOSP IP/OBS DSCHRG MGMT >30: CPT | Performed by: INTERNAL MEDICINE

## 2020-01-14 RX ORDER — LORAZEPAM 0.5 MG/1
0.5 TABLET ORAL EVERY 6 HOURS PRN
Qty: 10 TABLET | Refills: 0 | Status: SHIPPED | OUTPATIENT
Start: 2020-01-14

## 2020-01-14 RX ORDER — OXYCODONE HYDROCHLORIDE 10 MG/1
10 TABLET ORAL EVERY 4 HOURS PRN
Qty: 15 TABLET | Refills: 0 | Status: SHIPPED | OUTPATIENT
Start: 2020-01-14

## 2020-01-14 RX ORDER — METOCLOPRAMIDE HYDROCHLORIDE 5 MG/5ML
5 SOLUTION ORAL
Qty: 120 ML | Refills: 1 | Status: SHIPPED | OUTPATIENT
Start: 2020-01-14

## 2020-01-14 RX ORDER — FENTANYL 50 UG/H
1 PATCH TRANSDERMAL
Qty: 2 PATCH | Refills: 0 | Status: SHIPPED | OUTPATIENT
Start: 2020-01-14

## 2020-01-14 RX ORDER — PANTOPRAZOLE SODIUM 40 MG/10ML
40 INJECTION, POWDER, LYOPHILIZED, FOR SOLUTION INTRAVENOUS
Status: DISCONTINUED | OUTPATIENT
Start: 2020-01-14 | End: 2020-01-14 | Stop reason: HOSPADM

## 2020-01-14 RX ADMIN — CEFEPIME 2 G: 2 INJECTION, POWDER, FOR SOLUTION INTRAVENOUS at 13:30

## 2020-01-14 RX ADMIN — AMLODIPINE BESYLATE 5 MG: 5 TABLET ORAL at 06:06

## 2020-01-14 RX ADMIN — LISINOPRIL 40 MG: 20 TABLET ORAL at 06:06

## 2020-01-14 RX ADMIN — ACETYLCYSTEINE 2 ML: 200 SOLUTION ORAL; RESPIRATORY (INHALATION) at 06:12

## 2020-01-14 RX ADMIN — GUAIFENESIN 600 MG: 100 SOLUTION ORAL at 13:18

## 2020-01-14 RX ADMIN — PANTOPRAZOLE SODIUM 40 MG: 40 INJECTION, POWDER, FOR SOLUTION INTRAVENOUS at 06:06

## 2020-01-14 RX ADMIN — IPRATROPIUM BROMIDE AND ALBUTEROL SULFATE 3 ML: .5; 3 SOLUTION RESPIRATORY (INHALATION) at 06:12

## 2020-01-14 RX ADMIN — OXYCODONE HYDROCHLORIDE 10 MG: 5 TABLET ORAL at 17:13

## 2020-01-14 RX ADMIN — IPRATROPIUM BROMIDE AND ALBUTEROL SULFATE 3 ML: .5; 3 SOLUTION RESPIRATORY (INHALATION) at 11:40

## 2020-01-14 RX ADMIN — LEVOTHYROXINE SODIUM 175 MCG: 175 TABLET ORAL at 06:06

## 2020-01-14 RX ADMIN — CEFEPIME 2 G: 2 INJECTION, POWDER, FOR SOLUTION INTRAVENOUS at 06:06

## 2020-01-14 RX ADMIN — Medication 250 MG: at 09:24

## 2020-01-14 RX ADMIN — ANASTROZOLE 1 MG: 1 TABLET ORAL at 09:24

## 2020-01-14 RX ADMIN — GABAPENTIN 600 MG: 300 CAPSULE ORAL at 06:06

## 2020-01-14 RX ADMIN — OXYCODONE HYDROCHLORIDE 10 MG: 5 TABLET ORAL at 09:25

## 2020-01-14 RX ADMIN — HEPARIN SODIUM (PORCINE) LOCK FLUSH IV SOLN 100 UNIT/ML 500 UNITS: 100 SOLUTION at 18:11

## 2020-01-14 RX ADMIN — OXYCODONE HYDROCHLORIDE 10 MG: 5 TABLET ORAL at 02:42

## 2020-01-14 RX ADMIN — VANCOMYCIN HYDROCHLORIDE 1250 MG: 10 INJECTION, POWDER, LYOPHILIZED, FOR SOLUTION INTRAVENOUS at 06:06

## 2020-01-14 RX ADMIN — GABAPENTIN 600 MG: 300 CAPSULE ORAL at 13:30

## 2020-01-14 RX ADMIN — GUAIFENESIN 600 MG: 100 SOLUTION ORAL at 09:24

## 2020-01-14 RX ADMIN — FENTANYL 1 PATCH: 50 PATCH, EXTENDED RELEASE TRANSDERMAL at 18:24

## 2020-01-14 RX ADMIN — METOCLOPRAMIDE 5 MG: 5 INJECTION, SOLUTION INTRAMUSCULAR; INTRAVENOUS at 09:24

## 2020-01-14 NOTE — PLAN OF CARE
Problem: Patient Care Overview  Goal: Plan of Care Review  Flowsheets (Taken 1/13/2020 1901)  Plan of Care Reviewed With: patient  Note:   Patient rested comfortably in bed throughout the night.  Patient complained of pain on the right side of her neck, treated per MAR.  No new concerns at this time, will continue to monitor.

## 2020-01-14 NOTE — PLAN OF CARE
Problem: Patient Care Overview  Goal: Plan of Care Review  Outcome: Ongoing (interventions implemented as appropriate)  Flowsheets  Taken 1/13/2020 1601  Progress: improving  Outcome Summary: Patient now on 5L NC and rested most of the day. States she still has chronic pain in her left cheek. Tube feeds at goal rate. Wound on right neck needs addressed. Patient trach was suctioned and cleansed twice today with thick secretions present. Patient presents with a productive cough. Family wants to be alerted when the patient is to discharge and how the oxygen will be set up in the home as she lives in both homes of her daughter and sister.  Taken 1/13/2020 0080  Plan of Care Reviewed With: patient;sibling

## 2020-01-14 NOTE — SIGNIFICANT NOTE
01/14/20 1725   Discharge of Care   Discharge Mode wheel chair   Discharge Destination home with home health   Discharged Accompanied by family member/friend   Discharge Contact Information if Applicable [phone]   Discharge Teaching Done  Yes   Learning Method Explanation

## 2020-01-14 NOTE — DISCHARGE SUMMARY
[facility] Medicine Services  DISCHARGE SUMMARY        Prepared For PCP:  Lorenza Parham MD    Patient Name: Elma Sheikh  : 1950  MRN: [id number]      Date of Admission:   2020    Date of Discharge:  2020    Length of stay:  LOS: 5 days     Hospital Course     Presenting Problem:   Hypoxia [R09.02]  Dyspnea, unspecified type [R06.00]  Pneumonia due to infectious organism, unspecified laterality, unspecified part of lung [J18.9]      Active Hospital Problems    Diagnosis  POA   • **Dyspnea [R06.00]  Yes   • Hypothyroidism [E03.9]  Yes   • Pneumonia due to infectious organism [J18.9]  Yes   • History of right breast cancer [Z85.3]  Not Applicable   • Chronic GERD [K21.9]  Yes   • Hypertension [I10]  Yes   • Tongue cancer (CMS/HCC) [C02.9]  Yes      Resolved Hospital Problems   No resolved problems to display.           Hospital Course:  Elma Sheikh is a 69 y.o. female with a history of tongue cancer status post resection and then recurrence in the neck status post bilateral neck dissections and trach and G-tube placement who has been undergoing radiation to her right neck over the last week for recurrence.  Apparently each time she has had this these radiation treatments she had significant increase in secretions with hypoxia, and she ended up in the hospital last time as well.     Patient was seen and evaluated by Pulmonology on this admission and treated for acute hypoxic respiratory failure due to healthcare associated pneumonia.  She was started on broad spectrum IV antibiotics, breathing treatments and mucolytics for all the thick and copious secretions she was having. Imaging showed a RLL pneumonia.  All cultures were negative from blood and sputum.  She had some nausea and vomiting, but this resolved.  She completed a 6-day course of abx and was discharged home in good condition with home health.        Day of Discharge     HPI:  Pt still having some  secretions. But feels better overall.      Vital Signs:   Temp:  [98.2 °F (36.8 °C)-98.3 °F (36.8 °C)] 98.3 °F (36.8 °C)  Heart Rate:  [] 99  Resp:  [16-18] 17  BP: (105-130)/(65-78) 105/65     Physical Exam:  General: Frail, cachectic, NAD  HEENT: NC/AT, EOMI, PERRLA, ostomy site noninflamed  Heart: RRR. No murmur   Chest: CTAB, with a few minor rhonchi, normal respiratory effort  Abdominal: Soft. NT/ND. Bowel sounds present  Musculoskeletal: Normal ROM.  No edema. No calf tenderness.  Neurological: AAOx3, no focal deficits  Skin: Skin is warm and dry. No rash  Psychiatric: Normal mood and affect.    Pertinent  and/or Most Recent Results     Results from last 7 days   Lab Units 01/14/20  0242 01/13/20  0342 01/12/20  0423 01/11/20  0400 01/11/20  0359 01/08/20  2354   WBC 10*3/mm3 6.50  --   --  9.20  --  11.80*   HEMOGLOBIN g/dL 8.6*  --   --  9.6*  --  9.0*   HEMATOCRIT % 26.4*  --   --  29.0*  --  27.1*   PLATELETS 10*3/mm3 388  --   --  437  --  403   SODIUM mmol/L 129* 130* 129*  --  129* 126*   POTASSIUM mmol/L 4.0 3.9 3.8  --  3.7 4.8   CHLORIDE mmol/L 85* 88* 84*  --  84* 83*   CO2 mmol/L 35.0* 34.0* 34.0*  --  34.0* 32.0*   BUN mg/dL 13 12 9  --  9 19   CREATININE mg/dL 0.32* 0.33* 0.35*  --  0.40* 0.46*   GLUCOSE mg/dL 114* 131* 129*  --  145* 144*   CALCIUM mg/dL 8.7 8.3* 8.5*  --  8.8 8.9           Invalid input(s): PROT, LABALBU        Invalid input(s): TG, LDLCALC, LDLREALC  Results from last 7 days   Lab Units 01/11/20  0400 01/09/20  1416 01/09/20  0458   TSH uIU/mL  --   --  0.062*   PROCALCITONIN ng/mL  --  0.11  --    LACTATE mmol/L 1.8  --   --        Brief Urine Lab Results  (Last result in the past 365 days)      Color   Clarity   Blood   Leuk Est   Nitrite   Protein   CREAT   Urine HCG        06/16/19 1938 Yellow Clear Negative Negative Negative Negative               Microbiology Results Abnormal     Procedure Component Value - Date/Time    Blood Culture - Blood, Blood, Venous Line  [[id number]] Collected:  01/09/20 0050    Lab Status:  Final result Specimen:  Blood, Venous Line Updated:  01/14/20 0100     Blood Culture No growth at 5 days    Respiratory Culture - Sputum, Throat [[id number]] Collected:  01/10/20 1452    Lab Status:  Final result Specimen:  Sputum from Throat Updated:  01/12/20 0654     Respiratory Culture Scant growth (1+) Normal Respiratory Tori     Gram Stain Many (4+) WBCs seen      Few (2+) Epithelial cells seen    Legionella Antigen, Urine - Urine, Urine, Clean Catch [[id number]]  (Normal) Collected:  01/10/20 1713    Lab Status:  Final result Specimen:  Urine, Clean Catch Updated:  01/11/20 0910     LEGIONELLA ANTIGEN, URINE Negative    S. Pneumo Ag Urine or CSF - Urine, Urine, Clean Catch [[id number]]  (Normal) Collected:  01/10/20 1713    Lab Status:  Final result Specimen:  Urine, Clean Catch Updated:  01/11/20 0910     Strep Pneumo Ag Negative    Respiratory Panel, PCR - Swab, Nasopharynx [[id number]]  (Normal) Collected:  01/10/20 1634    Lab Status:  Final result Specimen:  Swab from Nasopharynx Updated:  01/10/20 1922     ADENOVIRUS, PCR Not Detected     Coronavirus 229E Not Detected     Coronavirus HKU1 Not Detected     Coronavirus NL63 Not Detected     Coronavirus OC43 Not Detected     Human Metapneumovirus Not Detected     Human Rhinovirus/Enterovirus Not Detected     Influenza B PCR Not Detected     Parainfluenza Virus 1 Not Detected     Parainfluenza Virus 2 Not Detected     Parainfluenza Virus 3 Not Detected     Parainfluenza Virus 4 Not Detected     Bordetella pertussis pcr Not Detected     Influenza A H1 2009 PCR Not Detected     Chlamydophila pneumoniae PCR Not Detected     Mycoplasma pneumo by PCR Not Detected     Influenza A PCR Not Detected     Influenza A H3 Not Detected     Influenza A H1 Not Detected     RSV, PCR Not Detected          Xr Chest 1 View    Result Date: 1/9/2020  Impression:  1. Right medial basilar infiltrate. Correlate clinically for  pneumonia. Follow-up evaluation recommended to document resolution.  Electronically Signed By-Fabián Singleton On:1/9/2020 8:05 AM This report was finalized on [id number] by  Fabián Singleton, .                             Test Results Pending at Discharge        Procedures Performed           Consults:   Consults     Date and Time Order Name Status Description    1/10/2020 0920 Inpatient Pulmonology Consult      1/9/2020 0029 Hospitalist (on-call MD unless specified) Completed             Discharge Details        Discharge Medications      New Medications      Instructions Start Date   guaiFENesin 200 MG/10ML solution   600 mg, Oral, 4 Times Daily      metoclopramide 5 MG/5ML solution  Commonly known as:  REGLAN   5 mg, Per G Tube, 4 Times Daily Before Meals & Nightly      pantoprazole 2 mg/mL suspension suspension  Commonly known as:  PROTONIX   40 mg, Per G Tube, Daily         Changes to Medications      Instructions Start Date   LORazepam 0.5 MG tablet  Commonly known as:  ATIVAN  What changed:  reasons to take this   0.5 mg, Oral, Every 6 Hours PRN, for anxiety         Continue These Medications      Instructions Start Date   acetylcysteine 20 % nebulizer solution  Commonly known as:  MUCOMYST   2 mL, Nebulization, 2 Times Daily      amLODIPine 5 MG tablet  Commonly known as:  NORVASC   5 mg, Oral, Every Morning      anastrozole 1 MG tablet  Commonly known as:  ARIMIDEX   1 mg, Oral, Daily      fentaNYL 50 MCG/HR patch  Commonly known as:  DURAGESIC   1 patch, Transdermal, Every 72 Hours      gabapentin 600 MG tablet  Commonly known as:  NEURONTIN   600 mg, Oral, 3 Times Daily      ipratropium-albuterol 0.5-2.5 mg/3 ml nebulizer  Commonly known as:  DUO-NEB   3 mL, Nebulization, Every 4 Hours PRN      lisinopril 40 MG tablet  Commonly known as:  PRINIVIL,ZESTRIL   40 mg, Oral, Every Morning      oxyCODONE 10 MG tablet  Commonly known as:  ROXICODONE   10 mg, Oral, Every 4 Hours PRN      PROBIOTIC DAILY PO   1  capsule, Oral, Daily      SYNTHROID 175 MCG tablet  Generic drug:  levothyroxine   175 mcg, Oral, Daily         Stop These Medications    famotidine 20 MG tablet  Commonly known as:  PEPCID     levoFLOXacin 25 MG/ML solution  Commonly known as:  LEVAQUIN            Allergies   Allergen Reactions   • Sulfa Antibiotics Swelling     Face, neck, trouble swallowing   • Succinylcholine Other (See Comments)     extreme muscle weakness          Discharge Disposition:  Home-Health Care Svc    Diet:  Hospital:  Diet Order   Procedures   • NPO Diet         Discharge Activity:   Activity Instructions     Activity as Tolerated              CODE STATUS:    Code Status and Medical Interventions:   Ordered at: 01/09/20 0226     Code Status:    CPR     Medical Interventions (Level of Support Prior to Arrest):    Full         Follow-up Appointments  No future appointments.    Additional Instructions for the Follow-ups that You Need to Schedule     Ambulatory Referral to Home Health   As directed      Current with [facility]    Order Comments:  Current with [facility]     Face to Face Visit Date:  1/9/2020    Follow-up provider for Plan of Care?:  I treated the patient in an acute care facility and will not continue treatment after discharge.    Follow-up provider:  LORENZA MOODY [[id number]]    Reason/Clinical Findings:  weakness    Describe mobility limitations that make leaving home difficult:  cannot leave home without taxing effort    Nursing/Therapeutic Services Requested:  Other (eval and treat)    Frequency:  1 Week 1         Call MD With Problems / Concerns   As directed      Instructions: Call [phone] or email [email or web address] for problems or concerns.    Order Comments:  Instructions: Call [phone] or email [email or web address] for problems or concerns.          Discharge Follow-up with PCP   As directed       Currently Documented PCP:    Lorenza Moody MD    PCP Phone Number:    [phone]      Follow Up Details:  1-2 weeks         Discharge Follow-up with Specialty: Pulmonology - Dr. Sellers; 2 Weeks   As directed      Specialty:  Pulmonology - Dr. Sellers    Follow Up:  2 Weeks                 Condition on Discharge:      Stable          Electronically signed by Brynn Bain MD, 01/14/20, 4:28 PM.    Time: I spent  35  minutes on this discharge activity which included face-to-face encounter with the patient/reviewing the data in the system/coordination of the care with the nursing staff as well as consultants/documentation/entering orders.

## 2020-01-14 NOTE — NURSING NOTE
Exercise Oximetry    Patient Name:Elma Sheikh   MRN: [id number]   Date: 01/14/20             ROOM AIR BASELINE   SpO2% 94   Heart Rate 108   Blood Pressure      EXERCISE ON ROOM AIR SpO2% EXERCISE ON O2 @  LPM SpO2%   1 MINUTE 93 1 MINUTE    2 MINUTES 91 2 MINUTES    3 MINUTES 91 3 MINUTES    4 MINUTES 92 4 MINUTES    5 MINUTES 91 5 MINUTES    6 MINUTES 91 6 MINUTES               Distance Walked   Distance Walked   Dyspnea (Shaq Scale)   Dyspnea (Shaq Scale)   Fatigue (Shaq Scale)   Fatigue (Shaq Scale)   SpO2% Post Exercise  93 SpO2% Post Exercise   HR Post Exercise  112 HR Post Exercise   Time to Recovery  ONE MINUTE Time to Recovery     Comments: PATIENT WALKED ON ROOM AIR, O2 SATS REMAINED 91% THRU-OUT THE WALK

## 2020-01-14 NOTE — PROGRESS NOTES
"KPA/PULM/CC PROGRESS NOTE     History of present illness  The patient is a 69-year-old who presented to the emergency department for evaluation of shortness of air and hypoxia.  The following information has been supplemented by review of documentation and interview with the patient's niece at the bedside as the patient is nonverbal with a tracheostomy in place making communication difficult.  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  She completed a 5-day cycle of chemotherapy on 1/8/2020 in [facility].  Over the past 2 days the patient has had progressive worsening of shortness of air.  She reports that she has had a minimal amount of clear and yellow expectoration, subjective fever and chills, emesis, diarrhea for the past 3 to 4 days.  She receives intermittent tube feeds via PEG and reports that the emesis looked like tube feeds and she did not have nausea prior to the events.  Her family reported that at home her room air saturation dropped to the 70s and she presented to the emergency department for further evaluation.     In the emergency department chest x-ray revealed right lower lobe filtrate consistent with pneumonia.  WBCs 11.80, lactate was not performed.  ABG on 40% trach collar: 7.4 9/43/76/30 3/96%.  The patient was admitted for further evaluation and treatment of pneumonia and hypoxic respiratory failure.      SUBJECTIVE    1/11: Patient reports feeling somewhat better today.  Shortness of air has improved.  She denies chest pain.  Tolerating O2 at 40% via trach mask.  Tolerating tube feeds  1/12 no SOA/CP   1/13: No acute changes reported.  Tolerating 5 L O2  1/14 no SOA/CP     OBJECTIVE    /65 (BP Location: Left arm, Patient Position: Lying)   Pulse 99   Temp 98.3 °F (36.8 °C) (Oral)   Resp 17   Ht 162.6 cm (64\")   Wt 59 kg (130 lb)   " LMP  (LMP Unknown)   SpO2 93%   Breastfeeding No   BMI 22.31 kg/m²   Intake/Output last 3 shifts:  I/O last 3 completed shifts:  In: 1911 [Other:70; NG/GT:591; IV Piggyback:1250]  Out: 700 [Urine:700]  Intake/Output this shift:  I/O this shift:  In: -   Out: 1500 [Urine:1500]    Intake/Output Summary (Last 24 hours) at 1/14/2020 1701  Last data filed at 1/14/2020 1300  Gross per 24 hour   Intake 270 ml   Output 1500 ml   Net -1230 ml       Constitutional:  Well developed, well nourished, no acute distress, chronically ill-appearing  Eyes:  PERRL, conjunctiva normal   HENT:  Atraumatic, external ears normal, nose normal, tongue prosthesis in place  Neck-midline tracheostomy.  Right lateral neck wound, dry with no drainage  Respiratory:  No respiratory distress,  no rales  Cardiovascular: Regular rate, systolic murmur, no gallops, no rubs   GI:  Soft, nondistended, normal bowel sounds, nontender, left upper quadrant feeding tube, no rebound, no guarding   : Deferred  Musculoskeletal:  No edema, no tenderness, no deformities  Integument:  Well hydrated, no rash.  Right neck wound  Neurologic:  Alert & appropriate.  Unable to establish orientation, patient is nonverbal however she does seem to communicate appropriately.  No lateralizing deficits  Psychiatric: Calm    Scheduled Meds:    acetylcysteine 2 mL Nebulization BID   amLODIPine 5 mg Oral QAM   anastrozole 1 mg Oral Daily   cefepime 2 g Intravenous Q8H   enoxaparin 40 mg Subcutaneous Q24H   fentaNYL 1 patch Transdermal Q72H   gabapentin 600 mg Oral Q8H   guaiFENesin 600 mg Oral 4x Daily   ipratropium-albuterol 3 mL Nebulization Q6H While Awake - RT   levothyroxine 175 mcg Oral Q AM   lisinopril 40 mg Oral QAM   metoclopramide 5 mg Intravenous 4x Daily AC & at Bedtime   pantoprazole 40 mg Intravenous Q AM   saccharomyces boulardii 250 mg Oral Daily       Continuous Infusions:    sodium chloride 75 mL/hr Last Rate: 75 mL/hr (01/13/20 0613)       PRN  Meds:ipratropium-albuterol  •  LORazepam  •  oxyCODONE  •  [COMPLETED] Insert peripheral IV **AND** sodium chloride     Data Review:  Lab Results (last 24 hours)     Procedure Component Value Units Date/Time    Magnesium [[id number]]  (Normal) Collected:  01/14/20 0242    Specimen:  Blood Updated:  01/14/20 0424     Magnesium 1.9 mg/dL     Phosphorus [[id number]]  (Normal) Collected:  01/14/20 0242    Specimen:  Blood Updated:  01/14/20 0424     Phosphorus 3.0 mg/dL     Basic Metabolic Panel [[id number]]  (Abnormal) Collected:  01/14/20 0242    Specimen:  Blood Updated:  01/14/20 0424     Glucose 114 mg/dL      BUN 13 mg/dL      Creatinine 0.32 mg/dL      Sodium 129 mmol/L      Potassium 4.0 mmol/L      Chloride 85 mmol/L      CO2 35.0 mmol/L      Calcium 8.7 mg/dL      eGFR Non African Amer >150 mL/min/1.73      BUN/Creatinine Ratio 40.6     Anion Gap 9.0 mmol/L     Narrative:       GFR Normal >60  Chronic Kidney Disease <60  Kidney Failure <15      CBC & Differential [[id number]] Collected:  01/14/20 0242    Specimen:  Blood Updated:  01/14/20 0355    Narrative:       The following orders were created for panel order CBC & Differential.  Procedure                               Abnormality         Status                     ---------                               -----------         ------                     CBC Auto Differential[[id number]]        Abnormal            Final result                 Please view results for these tests on the individual orders.    CBC Auto Differential [[id number]]  (Abnormal) Collected:  01/14/20 0242    Specimen:  Blood Updated:  01/14/20 0355     WBC 6.50 10*3/mm3      RBC 3.28 10*6/mm3      Hemoglobin 8.6 g/dL      Hematocrit 26.4 %      MCV 80.4 fL      MCH 26.2 pg      MCHC 32.6 g/dL      RDW 16.2 %      RDW-SD 45.9 fl      MPV 6.4 fL      Platelets 388 10*3/mm3      Neutrophil % 80.7 %      Lymphocyte % 8.2 %      Monocyte % 8.5 %      Eosinophil % 2.3 %      Basophil % 0.3 %       Neutrophils, Absolute 5.30 10*3/mm3      Lymphocytes, Absolute 0.50 10*3/mm3      Monocytes, Absolute 0.60 10*3/mm3      Eosinophils, Absolute 0.10 10*3/mm3      Basophils, Absolute 0.00 10*3/mm3      nRBC 0.1 /100 WBC     Blood Culture - Blood, Blood, Venous Line [[id number]] Collected:  01/09/20 0050    Specimen:  Blood, Venous Line Updated:  01/14/20 0100     Blood Culture No growth at 5 days    Vancomycin, Trough [[id number]]  (Normal) Collected:  01/13/20 1621    Specimen:  Blood Updated:  01/13/20 1741     Vancomycin Trough 5.00 mcg/mL              Imaging:  Imaging Results (Last 72 Hours)     ** No results found for the last 72 hours. **          ASSESSMENT    Healthcare acquired pneumonia, present on admission.  Patient has been undergoing chemotherapy  Acute hypoxemic respiratory failure secondary to pneumonia  Vomiting, currently resolved  Hyponatremia  Oral cancer status post bilateral radical neck dissection and subsequent recurrence of right neck mass.  Status post radiation and chemotherapy  History of breast cancer  Valvular heart disease with known murmur        PLAN    Titrate O2 to maintain a saturation of 91%.  Currently on 5 liters   Broad-spectrum antibiotic coverage to cefepime and vancomycin  Sputum culture via tracheal suction negative so far.   Procalcitonin low  Urine antigens for Legionella and strep pneumo negative  Respiratory viral panel negative  Blood cultures neg   DuoNeb scheduled and PRN  Mucomyst nebs  Chronic conditions managed per primary

## 2020-01-14 NOTE — PROGRESS NOTES
"      [facility] Medicine Services Daily Progress Note      Hospitalist Team  LOS 4 days      Patient Care Team:  Lorenza Parham MD as PCP - General  Lorenza Parham MD as PCP - Family Medicine    Patient Location: 244/1      Subjective   Subjective     Chief Complaint / Subjective  Chief Complaint   Patient presents with   • Altered Mental Status         Subjective        Review of Systems   All other systems reviewed and are negative.        Objective   Objective      Vital Signs  Temp:  [97.9 °F (36.6 °C)-98.9 °F (37.2 °C)] 98.9 °F (37.2 °C)  Heart Rate:  [] 92  Resp:  [16-20] 17  BP: (129-130)/(74-76) 130/76  Oxygen Therapy  SpO2: 97 %  Pulse Oximetry Type: Intermittent  Device (Oxygen Therapy): humidified, tracheostomy collar  Flow (L/min): 5  Oxygen Concentration (%): 28  Flowsheet Rows      First Filed Value   Admission Height  162.6 cm (64\") Documented at 01/08/2020 2327   Admission Weight  64.3 kg (141 lb 12.1 oz) Documented at 01/08/2020 2327        Intake & Output (last 3 days)       01/11 0701 - 01/12 0700 01/12 0701 - 01/13 0700 01/13 0701 - 01/14 0700    P.O.   0    Other 90 40 10    NG/GT   391    IV Piggyback   1000    Total Intake(mL/kg) 90 (1.4) 40 (0.6) 1401 (21.9)    Urine (mL/kg/hr) 1500 (1) 450 (0.3) 700 (0.9)    Emesis/NG output   0    Stool 0 0 0    Total Output 1500 450 700    Net -1410 -410 +701           Urine Unmeasured Occurrence 1 x  2 x    Stool Unmeasured Occurrence 3 x 2 x 1 x        Lines, Drains & Airways    Active LDAs     Name: Placement date: Placement time: Site: Days:    Gastrostomy/Enterostomy LLQ  06/01/19   0301   LLQ  226    Surgical Airway Cuffed   01/09/20   1031   -- daughter unsure  4    Single Lumen Implantable Port 06/14/18 Left Chest  06/14/18   1456   Chest  578                  Physical Exam:    General: Frail, cachectic, NAD  HEENT: NC/AT, EOMI, PERRLA, tracheostomy site noninflamed  Heart: RRR. No murmur   Chest: Bilateral rhonchi " throughout, normal respiratory effort  Abdominal: Soft. NT/ND. Bowel sounds present  Musculoskeletal: Normal ROM.  No edema. No calf tenderness.  Neurological: AAOx3, no focal deficits  Skin: Skin is warm and dry. No rash  Psychiatric: Normal mood and affect.         Wounds (last 24 hours)      LDA Wound     Row Name 01/13/20 0745             Wound 01/09/20 0147 Right throat Abcess    Wound - Properties Group Date first assessed: 01/09/20  -DL Time first assessed: 0147  -DL Present on Hospital Admission: Y  -DL Side: Right  -DL Location: throat  -DL Primary Wound Type: Abcess  -DL    Dressing Appearance  dried drainage  -JOSE RAFAEL      Closure  Open to air  -JOSE RAFAEL      Base  yellow;white;slough  -JOSE RAFAEL      Black (%), Wound Tissue Color  0  -JOSE RAFAEL      Red (%), Wound Tissue Color  0  -JOSE RAFAEL      Yellow (%), Wound Tissue Color  100  -JOSE RAFAEL      Periwound  intact;dry  -JOSE RAFAEL      Periwound Temperature  warm  -JOSE RAFAEL      Periwound Skin Turgor  soft  -JOSE RAFAEL      Edges  irregular  -JOSE RAFAEL      Drainage Amount  small  -JOSE RAFAEL        User Key  (r) = Recorded By, (t) = Taken By, (c) = Cosigned By    Initials Name Provider Type    Rosy Flores, RN Registered Nurse    Lynne Shane RN Registered Nurse          Procedures:              Results Review:     I reviewed the patient's new clinical results.      Lab Results (last 24 hours)     Procedure Component Value Units Date/Time    Vancomycin, Trough [[id number]]  (Normal) Collected:  01/13/20 1621    Specimen:  Blood Updated:  01/13/20 1741     Vancomycin Trough 5.00 mcg/mL     Vancomycin, Peak [[id number]]  (Normal) Collected:  01/13/20 0342    Specimen:  Blood Updated:  01/13/20 0526     Vancomycin Peak 20.30 mcg/mL     Magnesium [[id number]]  (Normal) Collected:  01/13/20 0342    Specimen:  Blood Updated:  01/13/20 0526     Magnesium 1.9 mg/dL     Phosphorus [[id number]]  (Normal) Collected:  01/13/20 0342    Specimen:  Blood Updated:  01/13/20 0526     Phosphorus 2.7 mg/dL     Basic Metabolic Panel  [[id number]]  (Abnormal) Collected:  01/13/20 0342    Specimen:  Blood Updated:  01/13/20 0526     Glucose 131 mg/dL      BUN 12 mg/dL      Creatinine 0.33 mg/dL      Sodium 130 mmol/L      Potassium 3.9 mmol/L      Chloride 88 mmol/L      CO2 34.0 mmol/L      Calcium 8.3 mg/dL      eGFR Non African Amer >150 mL/min/1.73      BUN/Creatinine Ratio 36.4     Anion Gap 8.0 mmol/L     Narrative:       GFR Normal >60  Chronic Kidney Disease <60  Kidney Failure <15      Blood Culture - Blood, Blood, Venous Line [[id number]] Collected:  01/09/20 0050    Specimen:  Blood, Venous Line Updated:  01/13/20 0100     Blood Culture No growth at 4 days        No results found for: HGBA1C        Results from last 7 days   Lab Units 01/08/20  2348   PH, ARTERIAL pH units 7.495*   PO2 ART mm Hg 76.8*   PCO2, ARTERIAL mm Hg 43.4   HCO3 ART mmol/L 33.5*     Lab Results   Component Value Date    LIPASE 28 08/04/2018     Lab Results   Component Value Date    CHOL 185 01/11/2018    TRIG 78 01/11/2018    HDL 53 01/11/2018     (H) 01/11/2018       Lab Results   Lab Value Date/Time    FINALDX  01/17/2019 1200     1. Tongue, Right, Biopsy: Squamous mucosa and submucosa with    A. 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   B. Epithelial erosion with acute inflammation with surface Candidiasis.    [facility]/jse         FINALDX  07/10/2018 1100     1.  RIGHT TONGUE, BIOPSY:              POLYPOID GRANULATION TISSUE WITH ACUTE AND CHRONIC INFLAMMATION, ULCERATION, AND                     ADJACENT REACTIVE SQUAMOUS EPITHELIUM.    [facility]/  [facility]/a/CMK    CPT CODES:  1. 89484               Microbiology Results (last 10 days)     Procedure Component Value - Date/Time    Legionella Antigen, Urine - Urine, Urine, Clean Catch [[id number]]  (Normal) Collected:  01/10/20 1713    Lab Status:  Final result Specimen:  Urine, Clean Catch Updated:  01/11/20 0910     LEGIONELLA ANTIGEN, URINE Negative    S. Pneumo Ag Urine or CSF -  Urine, Urine, Clean Catch [[id number]]  (Normal) Collected:  01/10/20 1713    Lab Status:  Final result Specimen:  Urine, Clean Catch Updated:  01/11/20 0910     Strep Pneumo Ag Negative    Respiratory Panel, PCR - Swab, Nasopharynx [[id number]]  (Normal) Collected:  01/10/20 1634    Lab Status:  Final result Specimen:  Swab from Nasopharynx Updated:  01/10/20 1922     ADENOVIRUS, PCR Not Detected     Coronavirus 229E Not Detected     Coronavirus HKU1 Not Detected     Coronavirus NL63 Not Detected     Coronavirus OC43 Not Detected     Human Metapneumovirus Not Detected     Human Rhinovirus/Enterovirus Not Detected     Influenza B PCR Not Detected     Parainfluenza Virus 1 Not Detected     Parainfluenza Virus 2 Not Detected     Parainfluenza Virus 3 Not Detected     Parainfluenza Virus 4 Not Detected     Bordetella pertussis pcr Not Detected     Influenza A H1 2009 PCR Not Detected     Chlamydophila pneumoniae PCR Not Detected     Mycoplasma pneumo by PCR Not Detected     Influenza A PCR Not Detected     Influenza A H3 Not Detected     Influenza A H1 Not Detected     RSV, PCR Not Detected    Respiratory Culture - Sputum, Throat [[id number]] Collected:  01/10/20 1452    Lab Status:  Final result Specimen:  Sputum from Throat Updated:  01/12/20 0654     Respiratory Culture Scant growth (1+) Normal Respiratory Tori     Gram Stain Many (4+) WBCs seen      Few (2+) Epithelial cells seen    Blood Culture - Blood, Blood, Venous Line [[id number]] Collected:  01/09/20 0050    Lab Status:  Preliminary result Specimen:  Blood, Venous Line Updated:  01/13/20 0100     Blood Culture No growth at 4 days          ECG/EMG Results (most recent)     None                    Xr Chest 1 View    Result Date: 1/9/2020   1. Right medial basilar infiltrate. Correlate clinically for pneumonia. Follow-up evaluation recommended to document resolution.  Electronically Signed By-Fabián Singleton On:1/9/2020 8:05 AM This report was finalized on  [id number] by  Fabián Singleton, .          Xrays, labs reviewed personally by physician.    Medication Review:   I have reviewed the patient's current medication list      Scheduled Meds    acetylcysteine 2 mL Nebulization BID   amLODIPine 5 mg Oral QAM   anastrozole 1 mg Oral Daily   cefepime 2 g Intravenous Q8H   enoxaparin 40 mg Subcutaneous Q24H   fentaNYL 1 patch Transdermal Q72H   gabapentin 600 mg Oral Q8H   guaiFENesin 600 mg Oral 4x Daily   ipratropium-albuterol 3 mL Nebulization Q6H While Awake - RT   levothyroxine 175 mcg Oral Q AM   lisinopril 40 mg Oral QAM   metoclopramide 5 mg Intravenous 4x Daily AC & at Bedtime   pantoprazole 40 mg Oral Q AM   saccharomyces boulardii 250 mg Oral Daily   vancomycin 1,250 mg Intravenous Q12H       Meds Infusions    Pharmacy to dose vancomycin     sodium chloride 75 mL/hr Last Rate: 75 mL/hr (01/13/20 0613)       Meds PRN  ipratropium-albuterol  •  LORazepam  •  oxyCODONE  •  Pharmacy to dose vancomycin  •  [COMPLETED] Insert peripheral IV **AND** sodium chloride      Assessment/Plan   Assessment/Plan     Active Hospital Problems    Diagnosis  POA   • **Dyspnea [R06.00]  Yes   • Hypothyroidism [E03.9]  Yes   • Pneumonia due to infectious organism [J18.9]  Yes   • History of right breast cancer [Z85.3]  Not Applicable   • Chronic GERD [K21.9]  Yes   • Hypertension [I10]  Yes   • Neck abscess [L02.11]  Yes   • Tongue cancer (CMS/HCC) [C02.9]  Yes      Resolved Hospital Problems   No resolved problems to display.       MEDICAL DECISION MAKING COMPLEXITY BY PROBLEM:     Right lower lobe pneumonia  -Respiratory virus panel negative  -Sputum culture showed respiratory betsy  -Zithromax and Rocephin were changed to cefepime and vancomycin   -currently on 5L O2     Tongue cancer status post resection, status post recurrence, status post bilateral neck resections and trach and feeding tube placement; status post radiation, chemo and immunotherapy.  Patient just finished a 5-day  course of radiation treatment to the right neck on 1/8/2020 and has an eschar on her right neck at the site of the tumor recurrence.     Leukocytosis, likely secondary to above, resolved     Chronic anemia, hemoglobin 9.6 up from 9.0 on admission: Monitor     Intermittent vomiting, without associated nausea, resolved  - Nutrition consulted and tube feeding with water flushes ordered and tolerated by patient currently  -Continue Reglan 5 mg IV 4 times daily - switch to PO at discharge     Hyponatremia, moderate, sodium 126  -likely secondary to free water with tube feeding  -Improved to 129 with decreased free water; gentle IV fluids normal saline   -Monitor BMP     Chronic pain secondary to surgery and neck cancer  -Continue Roxicodone, Duragesic patch, and Neurontin     Essential hypertension, chronic and controlled: Continue amlodipine, lisinopril     Hormone suppression therapy: Continue Arimidex     Gastric reflux: continue pepcid      Anxiety, chronic: continue ativan     Hypothyroidism, chronic: TSH 2.1; continue Synthroid       VTE Prophylaxis - Lovenox 40 mg SC daily.      Code Status -   Code Status and Medical Interventions:   Ordered at: 01/09/20 0226     Code Status:    CPR     Medical Interventions (Level of Support Prior to Arrest):    Full       Discharge Planning    Plan to discharge home with home health when okay per pulmonology.  Hopeful to discharge tomorrow.      Destination      Coordination has not been started for this encounter.      Durable Medical Equipment      Coordination has not been started for this encounter.      Dialysis/Infusion      Coordination has not been started for this encounter.      Home Medical Care      Service Provider Request Status Selected Services Address Phone Number Fax Number    [facility] Accepted N/A [address] [id number] [phone]      Therapy      Coordination has not been started for this encounter.       Community Resources      Coordination has not been started for this encounter.            Electronically signed by Brynn Bain MD, 01/13/20, 7:11 PM.  [facility], [place], operated by [facility] Hospitalist Team

## 2020-01-15 ENCOUNTER — READMISSION MANAGEMENT (OUTPATIENT)
Dept: CALL CENTER | Facility: HOSPITAL | Age: 70
End: 2020-01-15

## 2020-01-15 NOTE — OUTREACH NOTE
Prep Survey      Responses   Facility patient discharged from?  [facility]   Is patient eligible?  Yes   Discharge diagnosis  Right lower lobe pneumonia   Does the patient have one of the following disease processes/diagnoses(primary or secondary)?  COPD/Pneumonia   Does the patient have Home health ordered?  Yes   What is the Home health agency?   [facility]    Is there a DME ordered?  Yes   What DME was ordered?  [facility]-for tube feeds-Continuous,  Isosource 1.5 (Jevity 1.5)   General alerts for this patient  trach/PEG in place   Prep survey completed?  Yes          Mili Brian RN

## 2020-01-15 NOTE — PROGRESS NOTES
Case Management Discharge Note      Final Note: [facility]    Provided post acute provider list?: Refused          Home Medical Care - Selection Complete      Service Provider Request Status Selected Services Address Phone Number Fax Number    [facility] Selected Home Health Services [address] [place], [place] [id number] [phone]                   Final Discharge Disposition Code: 06 - home with home health care

## 2020-01-16 ENCOUNTER — READMISSION MANAGEMENT (OUTPATIENT)
Dept: CALL CENTER | Facility: HOSPITAL | Age: 70
End: 2020-01-16

## 2020-01-16 NOTE — OUTREACH NOTE
COPD/PN Week 1 Survey      Responses   Facility patient discharged from?  [facility]   Does the patient have one of the following disease processes/diagnoses(primary or secondary)?  COPD/Pneumonia   Is there a successful TCM telephone encounter documented?  No   Was the primary reason for admission:  Pneumonia   Week 1 attempt successful?  Yes   Call start time  1123   Call end time  1144   General alerts for this patient  trach/PEG in place   Discharge diagnosis  Right lower lobe pneumonia   Is patient permission given to speak with other caregiver?  Yes   List who call center can speak with  Adrienne daughter   Person spoke with today (if not patient) and relationship  Adrienne, daughter   Meds reviewed with patient/caregiver?  Yes   Is the patient having any side effects they believe may be caused by any medication additions or changes?  No   Does the patient have all medications ordered at discharge?  No   What is keeping the patient from filling the prescriptions?  Pre-authorization in progress [Daughter states that protonix prescription was not signed and needs prior auth. ]   Nursing Interventions  Nurse provided patient education   Notified Case Management  Script issues   Prescription comments  Daughter has called Hospitalist group # and left message but has not heard anything back yet.    Is the patient taking all medications as directed (includes completed medication regime)?  No   What is preventing the patient from taking all medications as directed?  Other   Medication comments  Daughter also has concern that patient did not go home on oral antibiotic as she was told.    Does the patient have a primary care provider?   Yes   Does the patient have an appointment with their PCP or pulmonologist within 7 days of discharge?  No   Comments regarding PCP  Lorenza Parham MD PCP   Nursing Interventions  Educated patient on importance of making appointment, Advised patient to make appointment   Has the patient  kept scheduled appointments due by today?  N/A   What is the Home health agency?   [facility]    Has home health visited the patient within 72 hours of discharge?  Yes   What DME was ordered?  [facility]-for tube feeds-Continuous,  Isosource 1.5 (Jevity 1.5)   Has all DME been delivered?  Yes   Psychosocial issues?  No   Did the patient receive a copy of their discharge instructions?  Yes   Nursing interventions  -- [Reviewed with daughter]   What is the patient's perception of their health status since discharge?  Improving   Is the patient/caregiver able to teach back the hierarchy of who to call/visit for symptoms/problems? PCP, Specialist, Home health nurse, Urgent Care, ED, 911  Yes   Is the patient/caregiver able to teach back signs and symptoms of worsening condition:  Fever/chills, Shortness of breath, Chest pain   Is the patient/caregiver able to teach back importance of completing antibiotic course of treatment?  Yes [Patient did not go home on oral antibiotic. ]   Week 1 call completed?  Yes          Jammie Gomez RN

## 2020-01-17 ENCOUNTER — HOSPITAL ENCOUNTER (INPATIENT)
Facility: HOSPITAL | Age: 70
LOS: 6 days | Discharge: HOME HEALTH | End: 2020-01-25
Attending: INTERNAL MEDICINE | Admitting: INTERNAL MEDICINE

## 2020-01-17 ENCOUNTER — APPOINTMENT (OUTPATIENT)
Dept: GENERAL RADIOLOGY | Facility: HOSPITAL | Age: 70
End: 2020-01-17

## 2020-01-17 DIAGNOSIS — D64.9 ANEMIA, UNSPECIFIED TYPE: ICD-10-CM

## 2020-01-17 DIAGNOSIS — C02.9 TONGUE CANCER (HCC): ICD-10-CM

## 2020-01-17 DIAGNOSIS — J18.9 PNEUMONIA OF RIGHT LOWER LOBE DUE TO INFECTIOUS ORGANISM: ICD-10-CM
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
## 2020-01-17 DIAGNOSIS — J18.9 HAP (HOSPITAL-ACQUIRED PNEUMONIA): Primary | ICD-10-CM

## 2020-01-17 DIAGNOSIS — R09.02 HYPOXIA: ICD-10-CM

## 2020-01-17 LAB
ALBUMIN SERPL-MCNC: 3.3 G/DL (ref 3.5–5.2)
ALBUMIN/GLOB SERPL: 0.9 G/DL
ALP SERPL-CCNC: 105 U/L (ref 39–117)
ALT SERPL W P-5'-P-CCNC: 29 U/L (ref 1–33)
ANION GAP SERPL CALCULATED.3IONS-SCNC: 13 MMOL/L (ref 5–15)
AST SERPL-CCNC: 17 U/L (ref 1–32)
BASOPHILS # BLD AUTO: 0 10*3/MM3 (ref 0–0.2)
BASOPHILS NFR BLD AUTO: 0.4 % (ref 0–1.5)
BILIRUB SERPL-MCNC: 0.2 MG/DL (ref 0.2–1.2)
BUN BLD-MCNC: 22 MG/DL (ref 8–23)
BUN/CREAT SERPL: 52.4 (ref 7–25)
CALCIUM SPEC-SCNC: 8.9 MG/DL (ref 8.6–10.5)
CHLORIDE SERPL-SCNC: 87 MMOL/L (ref 98–107)
CO2 SERPL-SCNC: 31 MMOL/L (ref 22–29)
CREAT BLD-MCNC: 0.42 MG/DL (ref 0.57–1)
DEPRECATED RDW RBC AUTO: 47.7 FL (ref 37–54)
EOSINOPHIL # BLD AUTO: 0.1 10*3/MM3 (ref 0–0.4)
EOSINOPHIL NFR BLD AUTO: 0.6 % (ref 0.3–6.2)
ERYTHROCYTE [DISTWIDTH] IN BLOOD BY AUTOMATED COUNT: 16.8 % (ref 12.3–15.4)
GFR SERPL CREATININE-BSD FRML MDRD: 150 ML/MIN/1.73
GLOBULIN UR ELPH-MCNC: 3.6 GM/DL
GLUCOSE BLD-MCNC: 150 MG/DL (ref 65–99)
HCT VFR BLD AUTO: 29.3 % (ref 34–46.6)
HGB BLD-MCNC: 9.4 G/DL (ref 12–15.9)
HOLD SPECIMEN: NORMAL
HOLD SPECIMEN: NORMAL
LYMPHOCYTES # BLD AUTO: 0.7 10*3/MM3 (ref 0.7–3.1)
LYMPHOCYTES NFR BLD AUTO: 5.8 % (ref 19.6–45.3)
MCH RBC QN AUTO: 25.9 PG (ref 26.6–33)
MCHC RBC AUTO-ENTMCNC: 32.1 G/DL (ref 31.5–35.7)
MCV RBC AUTO: 80.7 FL (ref 79–97)
MONOCYTES # BLD AUTO: 0.5 10*3/MM3 (ref 0.1–0.9)
MONOCYTES NFR BLD AUTO: 4.3 % (ref 5–12)
NEUTROPHILS # BLD AUTO: 10.1 10*3/MM3 (ref 1.7–7)
NEUTROPHILS NFR BLD AUTO: 88.9 % (ref 42.7–76)
NRBC BLD AUTO-RTO: 0.1 /100 WBC (ref 0–0.2)
NT-PROBNP SERPL-MCNC: 336.8 PG/ML (ref 5–900)
PLATELET # BLD AUTO: 397 10*3/MM3 (ref 140–450)
PMV BLD AUTO: 6.4 FL (ref 6–12)
POTASSIUM BLD-SCNC: 4.5 MMOL/L (ref 3.5–5.2)
PROT SERPL-MCNC: 6.9 G/DL (ref 6–8.5)
RBC # BLD AUTO: 3.63 10*6/MM3 (ref 3.77–5.28)
SODIUM BLD-SCNC: 131 MMOL/L (ref 136–145)
TROPONIN T SERPL-MCNC: <0.01 NG/ML (ref 0–0.03)
WBC NRBC COR # BLD: 11.4 10*3/MM3 (ref 3.4–10.8)
WHOLE BLOOD HOLD SPECIMEN: NORMAL
WHOLE BLOOD HOLD SPECIMEN: NORMAL

## 2020-01-17 PROCEDURE — 87040 BLOOD CULTURE FOR BACTERIA: CPT | Performed by: NURSE PRACTITIONER

## 2020-01-17 PROCEDURE — 93005 ELECTROCARDIOGRAM TRACING: CPT | Performed by: INTERNAL MEDICINE

## 2020-01-17 PROCEDURE — 25010000002 CEFEPIME PER 500 MG: Performed by: NURSE PRACTITIONER

## 2020-01-17 PROCEDURE — 94760 N-INVAS EAR/PLS OXIMETRY 1: CPT

## 2020-01-17 PROCEDURE — G0378 HOSPITAL OBSERVATION PER HR: HCPCS

## 2020-01-17 PROCEDURE — 94799 UNLISTED PULMONARY SVC/PX: CPT

## 2020-01-17 PROCEDURE — 71046 X-RAY EXAM CHEST 2 VIEWS: CPT

## 2020-01-17 PROCEDURE — 25010000002 VANCOMYCIN 10 G RECONSTITUTED SOLUTION: Performed by: NURSE PRACTITIONER

## 2020-01-17 PROCEDURE — 85025 COMPLETE CBC W/AUTO DIFF WBC: CPT | Performed by: NURSE PRACTITIONER

## 2020-01-17 PROCEDURE — 83880 ASSAY OF NATRIURETIC PEPTIDE: CPT | Performed by: NURSE PRACTITIONER

## 2020-01-17 PROCEDURE — 93005 ELECTROCARDIOGRAM TRACING: CPT

## 2020-01-17 PROCEDURE — 84484 ASSAY OF TROPONIN QUANT: CPT | Performed by: NURSE PRACTITIONER

## 2020-01-17 PROCEDURE — 99284 EMERGENCY DEPT VISIT MOD MDM: CPT

## 2020-01-17 PROCEDURE — 99219 PR INITIAL OBSERVATION CARE/DAY 50 MINUTES: CPT | Performed by: NURSE PRACTITIONER

## 2020-01-17 PROCEDURE — 94640 AIRWAY INHALATION TREATMENT: CPT

## 2020-01-17 PROCEDURE — 80053 COMPREHEN METABOLIC PANEL: CPT | Performed by: NURSE PRACTITIONER

## 2020-01-17 RX ORDER — AMLODIPINE BESYLATE 5 MG/1
5 TABLET ORAL EVERY MORNING
Status: DISCONTINUED | OUTPATIENT
Start: 2020-01-18 | End: 2020-01-23

## 2020-01-17 RX ORDER — METOCLOPRAMIDE HYDROCHLORIDE 5 MG/5ML
5 SOLUTION ORAL
Status: DISCONTINUED | OUTPATIENT
Start: 2020-01-17 | End: 2020-01-25 | Stop reason: HOSPADM

## 2020-01-17 RX ORDER — SODIUM CHLORIDE 0.9 % (FLUSH) 0.9 %
10 SYRINGE (ML) INJECTION AS NEEDED
Status: DISCONTINUED | OUTPATIENT
Start: 2020-01-17 | End: 2020-01-25 | Stop reason: HOSPADM

## 2020-01-17 RX ORDER — LANSOPRAZOLE
30 KIT DAILY
Status: DISCONTINUED | OUTPATIENT
Start: 2020-01-18 | End: 2020-01-25 | Stop reason: HOSPADM

## 2020-01-17 RX ORDER — IPRATROPIUM BROMIDE AND ALBUTEROL SULFATE 2.5; .5 MG/3ML; MG/3ML
3 SOLUTION RESPIRATORY (INHALATION) EVERY 4 HOURS PRN
Status: DISCONTINUED | OUTPATIENT
Start: 2020-01-17 | End: 2020-01-25 | Stop reason: HOSPADM

## 2020-01-17 RX ORDER — LABETALOL HYDROCHLORIDE 5 MG/ML
10 INJECTION, SOLUTION INTRAVENOUS ONCE
Status: COMPLETED | OUTPATIENT
Start: 2020-01-18 | End: 2020-01-17

## 2020-01-17 RX ORDER — FENTANYL 50 UG/H
1 PATCH TRANSDERMAL
Status: DISCONTINUED | OUTPATIENT
Start: 2020-01-17 | End: 2020-01-25 | Stop reason: HOSPADM

## 2020-01-17 RX ORDER — ACETYLCYSTEINE 200 MG/ML
2 SOLUTION ORAL; RESPIRATORY (INHALATION)
Status: DISCONTINUED | OUTPATIENT
Start: 2020-01-17 | End: 2020-01-25 | Stop reason: HOSPADM

## 2020-01-17 RX ORDER — LORAZEPAM 0.5 MG/1
0.5 TABLET ORAL EVERY 6 HOURS PRN
Status: DISCONTINUED | OUTPATIENT
Start: 2020-01-17 | End: 2020-01-25 | Stop reason: HOSPADM

## 2020-01-17 RX ORDER — SACCHAROMYCES BOULARDII 250 MG
250 CAPSULE ORAL DAILY
Status: DISCONTINUED | OUTPATIENT
Start: 2020-01-18 | End: 2020-01-21

## 2020-01-17 RX ORDER — IPRATROPIUM BROMIDE AND ALBUTEROL SULFATE 2.5; .5 MG/3ML; MG/3ML
3 SOLUTION RESPIRATORY (INHALATION) EVERY 4 HOURS PRN
Status: DISCONTINUED | OUTPATIENT
Start: 2020-01-17 | End: 2020-01-18

## 2020-01-17 RX ORDER — OXYCODONE HYDROCHLORIDE 5 MG/1
10 TABLET ORAL EVERY 4 HOURS PRN
Status: DISCONTINUED | OUTPATIENT
Start: 2020-01-17 | End: 2020-01-25 | Stop reason: HOSPADM

## 2020-01-17 RX ORDER — GABAPENTIN 300 MG/1
600 CAPSULE ORAL EVERY 8 HOURS SCHEDULED
Status: DISCONTINUED | OUTPATIENT
Start: 2020-01-17 | End: 2020-01-22

## 2020-01-17 RX ORDER — LABETALOL HYDROCHLORIDE 5 MG/ML
INJECTION, SOLUTION INTRAVENOUS
Status: COMPLETED
Start: 2020-01-17 | End: 2020-01-17

## 2020-01-17 RX ORDER — LISINOPRIL 20 MG/1
40 TABLET ORAL EVERY MORNING
Status: DISCONTINUED | OUTPATIENT
Start: 2020-01-18 | End: 2020-01-25 | Stop reason: HOSPADM

## 2020-01-17 RX ORDER — LEVOTHYROXINE SODIUM 175 UG/1
175 TABLET ORAL DAILY
Status: DISCONTINUED | OUTPATIENT
Start: 2020-01-18 | End: 2020-01-25 | Stop reason: HOSPADM

## 2020-01-17 RX ORDER — VANCOMYCIN HYDROCHLORIDE
1500 ONCE
Status: COMPLETED | OUTPATIENT
Start: 2020-01-17 | End: 2020-01-17

## 2020-01-17 RX ORDER — IPRATROPIUM BROMIDE AND ALBUTEROL SULFATE 2.5; .5 MG/3ML; MG/3ML
3 SOLUTION RESPIRATORY (INHALATION) ONCE
Status: COMPLETED | OUTPATIENT
Start: 2020-01-17 | End: 2020-01-17

## 2020-01-17 RX ORDER — ANASTROZOLE 1 MG/1
1 TABLET ORAL DAILY
Status: DISCONTINUED | OUTPATIENT
Start: 2020-01-18 | End: 2020-01-25 | Stop reason: HOSPADM

## 2020-01-17 RX ADMIN — LORAZEPAM 0.5 MG: 0.5 TABLET ORAL at 22:35

## 2020-01-17 RX ADMIN — ACETYLCYSTEINE 4 ML: 200 SOLUTION ORAL; RESPIRATORY (INHALATION) at 22:58

## 2020-01-17 RX ADMIN — IPRATROPIUM BROMIDE AND ALBUTEROL SULFATE 3 ML: .5; 3 SOLUTION RESPIRATORY (INHALATION) at 22:58

## 2020-01-17 RX ADMIN — FENTANYL 1 PATCH: 50 PATCH TRANSDERMAL at 22:18

## 2020-01-17 RX ADMIN — IPRATROPIUM BROMIDE AND ALBUTEROL SULFATE 3 ML: .5; 3 SOLUTION RESPIRATORY (INHALATION) at 19:32

## 2020-01-17 RX ADMIN — GABAPENTIN 600 MG: 300 CAPSULE ORAL at 22:18

## 2020-01-17 RX ADMIN — GUAIFENESIN 600 MG: 100 SOLUTION ORAL at 22:17

## 2020-01-17 RX ADMIN — VANCOMYCIN HYDROCHLORIDE 1500 MG: 10 INJECTION, POWDER, LYOPHILIZED, FOR SOLUTION INTRAVENOUS at 21:05

## 2020-01-17 RX ADMIN — METOCLOPRAMIDE HYDROCHLORIDE 5 MG: 5 SOLUTION ORAL at 22:18

## 2020-01-17 RX ADMIN — Medication 10 ML: at 22:20

## 2020-01-17 RX ADMIN — LABETALOL HYDROCHLORIDE 5 MG: 5 INJECTION, SOLUTION INTRAVENOUS at 23:39

## 2020-01-17 RX ADMIN — LABETALOL 20 MG/4 ML (5 MG/ML) INTRAVENOUS SYRINGE 5 MG: at 23:39

## 2020-01-17 RX ADMIN — CEFEPIME HYDROCHLORIDE 2 G: 2 INJECTION, POWDER, FOR SOLUTION INTRAVENOUS at 21:01

## 2020-01-18 PROBLEM — G89.29 CHRONIC PAIN: Status: ACTIVE | Noted: 2020-01-18

## 2020-01-18 LAB
ARTERIAL PATENCY WRIST A: POSITIVE
ATMOSPHERIC PRESS: ABNORMAL MM[HG]
BASE EXCESS BLDA CALC-SCNC: 8.4 MMOL/L (ref 0–3)
BASOPHILS # BLD AUTO: 0 10*3/MM3 (ref 0–0.2)
BASOPHILS NFR BLD AUTO: 0.3 % (ref 0–1.5)
BDY SITE: ABNORMAL
CO2 BLDA-SCNC: 34.8 MMOL/L (ref 22–29)
DEPRECATED RDW RBC AUTO: 47.7 FL (ref 37–54)
EOSINOPHIL # BLD AUTO: 0.1 10*3/MM3 (ref 0–0.4)
EOSINOPHIL NFR BLD AUTO: 0.9 % (ref 0.3–6.2)
ERYTHROCYTE [DISTWIDTH] IN BLOOD BY AUTOMATED COUNT: 16.5 % (ref 12.3–15.4)
HCO3 BLDA-SCNC: 33.3 MMOL/L (ref 21–28)
HCT VFR BLD AUTO: 24.6 % (ref 34–46.6)
HEMODILUTION: NO
HGB BLD-MCNC: 8 G/DL (ref 12–15.9)
HOROWITZ INDEX BLD+IHG-RTO: 40 %
LYMPHOCYTES # BLD AUTO: 0.6 10*3/MM3 (ref 0.7–3.1)
LYMPHOCYTES NFR BLD AUTO: 4.4 % (ref 19.6–45.3)
MCH RBC QN AUTO: 26.3 PG (ref 26.6–33)
MCHC RBC AUTO-ENTMCNC: 32.6 G/DL (ref 31.5–35.7)
MCV RBC AUTO: 80.6 FL (ref 79–97)
MODALITY: ABNORMAL
MONOCYTES # BLD AUTO: 0.6 10*3/MM3 (ref 0.1–0.9)
MONOCYTES NFR BLD AUTO: 4.4 % (ref 5–12)
NEUTROPHILS # BLD AUTO: 11.6 10*3/MM3 (ref 1.7–7)
NEUTROPHILS NFR BLD AUTO: 90 % (ref 42.7–76)
NRBC BLD AUTO-RTO: 0 /100 WBC (ref 0–0.2)
PCO2 BLDA: 47.5 MM HG (ref 35–48)
PH BLDA: 7.45 PH UNITS (ref 7.35–7.45)
PLATELET # BLD AUTO: 321 10*3/MM3 (ref 140–450)
PMV BLD AUTO: 6.6 FL (ref 6–12)
PO2 BLDA: 61.5 MM HG (ref 83–108)
PROCALCITONIN SERPL-MCNC: 0.12 NG/ML (ref 0.1–0.25)
RBC # BLD AUTO: 3.05 10*6/MM3 (ref 3.77–5.28)
SAO2 % BLDCOA: 92 % (ref 94–98)
WBC NRBC COR # BLD: 12.9 10*3/MM3 (ref 3.4–10.8)

## 2020-01-18 PROCEDURE — 36600 WITHDRAWAL OF ARTERIAL BLOOD: CPT

## 2020-01-18 PROCEDURE — 94799 UNLISTED PULMONARY SVC/PX: CPT

## 2020-01-18 PROCEDURE — 87205 SMEAR GRAM STAIN: CPT | Performed by: NURSE PRACTITIONER

## 2020-01-18 PROCEDURE — 25010000002 VANCOMYCIN 10 G RECONSTITUTED SOLUTION: Performed by: NURSE PRACTITIONER

## 2020-01-18 PROCEDURE — 87070 CULTURE OTHR SPECIMN AEROBIC: CPT | Performed by: NURSE PRACTITIONER

## 2020-01-18 PROCEDURE — G0378 HOSPITAL OBSERVATION PER HR: HCPCS

## 2020-01-18 PROCEDURE — 85025 COMPLETE CBC W/AUTO DIFF WBC: CPT | Performed by: NURSE PRACTITIONER

## 2020-01-18 PROCEDURE — 99226 PR SBSQ OBSERVATION CARE/DAY 35 MINUTES: CPT | Performed by: INTERNAL MEDICINE

## 2020-01-18 PROCEDURE — 84145 PROCALCITONIN (PCT): CPT | Performed by: NURSE PRACTITIONER

## 2020-01-18 PROCEDURE — 87040 BLOOD CULTURE FOR BACTERIA: CPT | Performed by: NURSE PRACTITIONER

## 2020-01-18 PROCEDURE — 25010000002 CEFEPIME PER 500 MG: Performed by: NURSE PRACTITIONER

## 2020-01-18 PROCEDURE — 93010 ELECTROCARDIOGRAM REPORT: CPT | Performed by: INTERNAL MEDICINE

## 2020-01-18 PROCEDURE — 82803 BLOOD GASES ANY COMBINATION: CPT

## 2020-01-18 RX ORDER — IPRATROPIUM BROMIDE AND ALBUTEROL SULFATE 2.5; .5 MG/3ML; MG/3ML
3 SOLUTION RESPIRATORY (INHALATION)
Status: DISCONTINUED | OUTPATIENT
Start: 2020-01-18 | End: 2020-01-25 | Stop reason: HOSPADM

## 2020-01-18 RX ADMIN — CEFEPIME 2 G: 2 INJECTION, POWDER, FOR SOLUTION INTRAVENOUS at 14:22

## 2020-01-18 RX ADMIN — IPRATROPIUM BROMIDE AND ALBUTEROL SULFATE 3 ML: .5; 3 SOLUTION RESPIRATORY (INHALATION) at 11:30

## 2020-01-18 RX ADMIN — CEFEPIME 2 G: 2 INJECTION, POWDER, FOR SOLUTION INTRAVENOUS at 05:56

## 2020-01-18 RX ADMIN — VANCOMYCIN HYDROCHLORIDE 1250 MG: 10 INJECTION, POWDER, LYOPHILIZED, FOR SOLUTION INTRAVENOUS at 21:07

## 2020-01-18 RX ADMIN — LANSOPRAZOLE 30 MG: KIT at 08:57

## 2020-01-18 RX ADMIN — GUAIFENESIN 600 MG: 100 SOLUTION ORAL at 18:23

## 2020-01-18 RX ADMIN — GABAPENTIN 600 MG: 300 CAPSULE ORAL at 21:09

## 2020-01-18 RX ADMIN — OXYCODONE HYDROCHLORIDE 10 MG: 5 TABLET ORAL at 06:01

## 2020-01-18 RX ADMIN — IPRATROPIUM BROMIDE AND ALBUTEROL SULFATE 3 ML: .5; 3 SOLUTION RESPIRATORY (INHALATION) at 15:00

## 2020-01-18 RX ADMIN — METOCLOPRAMIDE HYDROCHLORIDE 5 MG: 5 SOLUTION ORAL at 18:23

## 2020-01-18 RX ADMIN — IPRATROPIUM BROMIDE AND ALBUTEROL SULFATE 3 ML: .5; 3 SOLUTION RESPIRATORY (INHALATION) at 23:26

## 2020-01-18 RX ADMIN — LORAZEPAM 0.5 MG: 0.5 TABLET ORAL at 21:09

## 2020-01-18 RX ADMIN — LISINOPRIL 40 MG: 20 TABLET ORAL at 06:01

## 2020-01-18 RX ADMIN — AMLODIPINE BESYLATE 5 MG: 5 TABLET ORAL at 06:01

## 2020-01-18 RX ADMIN — METOCLOPRAMIDE HYDROCHLORIDE 5 MG: 5 SOLUTION ORAL at 21:09

## 2020-01-18 RX ADMIN — Medication 250 MG: at 08:56

## 2020-01-18 RX ADMIN — GABAPENTIN 600 MG: 300 CAPSULE ORAL at 06:01

## 2020-01-18 RX ADMIN — GUAIFENESIN 600 MG: 100 SOLUTION ORAL at 21:09

## 2020-01-18 RX ADMIN — METOCLOPRAMIDE HYDROCHLORIDE 5 MG: 5 SOLUTION ORAL at 12:28

## 2020-01-18 RX ADMIN — VANCOMYCIN HYDROCHLORIDE 1250 MG: 10 INJECTION, POWDER, LYOPHILIZED, FOR SOLUTION INTRAVENOUS at 10:46

## 2020-01-18 RX ADMIN — IPRATROPIUM BROMIDE AND ALBUTEROL SULFATE 3 ML: .5; 3 SOLUTION RESPIRATORY (INHALATION) at 19:06

## 2020-01-18 RX ADMIN — IPRATROPIUM BROMIDE AND ALBUTEROL SULFATE 3 ML: .5; 3 SOLUTION RESPIRATORY (INHALATION) at 07:05

## 2020-01-18 RX ADMIN — GABAPENTIN 600 MG: 300 CAPSULE ORAL at 14:22

## 2020-01-18 RX ADMIN — GUAIFENESIN 600 MG: 100 SOLUTION ORAL at 12:28

## 2020-01-18 RX ADMIN — ANASTROZOLE 1 MG: 1 TABLET ORAL at 08:57

## 2020-01-18 RX ADMIN — GUAIFENESIN 600 MG: 100 SOLUTION ORAL at 08:55

## 2020-01-18 RX ADMIN — METOCLOPRAMIDE HYDROCHLORIDE 5 MG: 5 SOLUTION ORAL at 08:55

## 2020-01-18 RX ADMIN — Medication 10 ML: at 21:09

## 2020-01-18 RX ADMIN — ACETYLCYSTEINE 2 ML: 200 SOLUTION ORAL; RESPIRATORY (INHALATION) at 19:07

## 2020-01-18 RX ADMIN — ACETYLCYSTEINE 2 ML: 200 SOLUTION ORAL; RESPIRATORY (INHALATION) at 07:05

## 2020-01-18 RX ADMIN — CEFEPIME 2 G: 2 INJECTION, POWDER, FOR SOLUTION INTRAVENOUS at 23:16

## 2020-01-18 RX ADMIN — OXYCODONE HYDROCHLORIDE 10 MG: 5 TABLET ORAL at 12:28

## 2020-01-18 RX ADMIN — LEVOTHYROXINE SODIUM 175 MCG: 175 TABLET ORAL at 08:56

## 2020-01-19 ENCOUNTER — READMISSION MANAGEMENT (OUTPATIENT)
Dept: CALL CENTER | Facility: HOSPITAL | Age: 70
End: 2020-01-19

## 2020-01-19 LAB
ALBUMIN SERPL-MCNC: 2.9 G/DL (ref 3.5–5.2)
ALBUMIN/GLOB SERPL: 0.9 G/DL
ALP SERPL-CCNC: 96 U/L (ref 39–117)
ALT SERPL W P-5'-P-CCNC: 27 U/L (ref 1–33)
ANION GAP SERPL CALCULATED.3IONS-SCNC: 8 MMOL/L (ref 5–15)
AST SERPL-CCNC: 23 U/L (ref 1–32)
B PERT DNA SPEC QL NAA+PROBE: NOT DETECTED
BASOPHILS # BLD AUTO: 0 10*3/MM3 (ref 0–0.2)
BASOPHILS NFR BLD AUTO: 0.2 % (ref 0–1.5)
BILIRUB SERPL-MCNC: 0.2 MG/DL (ref 0.2–1.2)
BUN BLD-MCNC: 11 MG/DL (ref 8–23)
BUN/CREAT SERPL: 34.4 (ref 7–25)
C PNEUM DNA NPH QL NAA+NON-PROBE: NOT DETECTED
CALCIUM SPEC-SCNC: 8.4 MG/DL (ref 8.6–10.5)
CHLORIDE SERPL-SCNC: 86 MMOL/L (ref 98–107)
CO2 SERPL-SCNC: 34 MMOL/L (ref 22–29)
CORTIS SERPL-MCNC: 22.17 MCG/DL
CREAT BLD-MCNC: 0.32 MG/DL (ref 0.57–1)
DEPRECATED RDW RBC AUTO: 46.8 FL (ref 37–54)
EOSINOPHIL # BLD AUTO: 0.2 10*3/MM3 (ref 0–0.4)
EOSINOPHIL NFR BLD AUTO: 1.9 % (ref 0.3–6.2)
ERYTHROCYTE [DISTWIDTH] IN BLOOD BY AUTOMATED COUNT: 16.6 % (ref 12.3–15.4)
FERRITIN SERPL-MCNC: 173.9 NG/ML (ref 13–150)
FIBRINOGEN PPP-MCNC: 590 MG/DL (ref 210–450)
FLUAV H1 2009 PAND RNA NPH QL NAA+PROBE: NOT DETECTED
FLUAV H1 HA GENE NPH QL NAA+PROBE: NOT DETECTED
FLUAV H3 RNA NPH QL NAA+PROBE: NOT DETECTED
FLUAV SUBTYP SPEC NAA+PROBE: NOT DETECTED
FLUBV RNA ISLT QL NAA+PROBE: NOT DETECTED
GFR SERPL CREATININE-BSD FRML MDRD: >150 ML/MIN/1.73
GLOBULIN UR ELPH-MCNC: 3.2 GM/DL
GLUCOSE BLD-MCNC: 123 MG/DL (ref 65–99)
HADV DNA SPEC NAA+PROBE: NOT DETECTED
HCOV 229E RNA SPEC QL NAA+PROBE: NOT DETECTED
HCOV HKU1 RNA SPEC QL NAA+PROBE: NOT DETECTED
HCOV NL63 RNA SPEC QL NAA+PROBE: NOT DETECTED
HCOV OC43 RNA SPEC QL NAA+PROBE: NOT DETECTED
HCT VFR BLD AUTO: 25.5 % (ref 34–46.6)
HEMOCCULT STL QL IA: POSITIVE
HGB BLD-MCNC: 8.3 G/DL (ref 12–15.9)
HMPV RNA NPH QL NAA+NON-PROBE: NOT DETECTED
HPIV1 RNA SPEC QL NAA+PROBE: NOT DETECTED
HPIV2 RNA SPEC QL NAA+PROBE: NOT DETECTED
HPIV3 RNA NPH QL NAA+PROBE: NOT DETECTED
HPIV4 P GENE NPH QL NAA+PROBE: NOT DETECTED
IRON 24H UR-MRATE: 13 MCG/DL (ref 37–145)
IRON SATN MFR SERPL: 5 % (ref 20–50)
L PNEUMO1 AG UR QL IA: NEGATIVE
LDH SERPL-CCNC: 187 U/L (ref 135–214)
LYMPHOCYTES # BLD AUTO: 0.5 10*3/MM3 (ref 0.7–3.1)
LYMPHOCYTES NFR BLD AUTO: 6.1 % (ref 19.6–45.3)
M PNEUMO IGG SER IA-ACNC: NOT DETECTED
MAGNESIUM SERPL-MCNC: 1.8 MG/DL (ref 1.6–2.4)
MAGNESIUM SERPL-MCNC: 1.9 MG/DL (ref 1.6–2.4)
MCH RBC QN AUTO: 26.2 PG (ref 26.6–33)
MCHC RBC AUTO-ENTMCNC: 32.6 G/DL (ref 31.5–35.7)
MCV RBC AUTO: 80.3 FL (ref 79–97)
MONOCYTES # BLD AUTO: 0.4 10*3/MM3 (ref 0.1–0.9)
MONOCYTES NFR BLD AUTO: 5.1 % (ref 5–12)
NEUTROPHILS # BLD AUTO: 7.2 10*3/MM3 (ref 1.7–7)
NEUTROPHILS NFR BLD AUTO: 86.7 % (ref 42.7–76)
NRBC BLD AUTO-RTO: 0.1 /100 WBC (ref 0–0.2)
PHOSPHATE SERPL-MCNC: 2.5 MG/DL (ref 2.5–4.5)
PHOSPHATE SERPL-MCNC: 2.6 MG/DL (ref 2.5–4.5)
PLATELET # BLD AUTO: 337 10*3/MM3 (ref 140–450)
PMV BLD AUTO: 6.9 FL (ref 6–12)
POTASSIUM BLD-SCNC: 3.8 MMOL/L (ref 3.5–5.2)
PROT SERPL-MCNC: 6.1 G/DL (ref 6–8.5)
RBC # BLD AUTO: 3.18 10*6/MM3 (ref 3.77–5.28)
RETICS # AUTO: 0.09 10*6/MM3 (ref 0.02–0.13)
RETICS/RBC NFR AUTO: 2.83 % (ref 0.7–1.9)
RHINOVIRUS RNA SPEC NAA+PROBE: NOT DETECTED
RSV RNA NPH QL NAA+NON-PROBE: NOT DETECTED
S PNEUM AG SPEC QL LA: NEGATIVE
SODIUM BLD-SCNC: 128 MMOL/L (ref 136–145)
SODIUM BLD-SCNC: 129 MMOL/L (ref 136–145)
TIBC SERPL-MCNC: 267 MCG/DL (ref 298–536)
TRANSFERRIN SERPL-MCNC: 179 MG/DL (ref 200–360)
TSH SERPL DL<=0.05 MIU/L-ACNC: 0.29 UIU/ML (ref 0.27–4.2)
URATE SERPL-MCNC: 1.2 MG/DL (ref 2.4–5.7)
VANCOMYCIN PEAK SERPL-MCNC: 23.1 MCG/ML (ref 20–40)
VANCOMYCIN TROUGH SERPL-MCNC: 9.1 MCG/ML (ref 5–20)
WBC NRBC COR # BLD: 8.3 10*3/MM3 (ref 3.4–10.8)

## 2020-01-19 PROCEDURE — 25010000002 CEFEPIME PER 500 MG: Performed by: NURSE PRACTITIONER

## 2020-01-19 PROCEDURE — 0097U HC BIOFIRE FILMARRAY GI PANEL: CPT | Performed by: NURSE PRACTITIONER

## 2020-01-19 PROCEDURE — 99233 SBSQ HOSP IP/OBS HIGH 50: CPT | Performed by: INTERNAL MEDICINE

## 2020-01-19 PROCEDURE — 83010 ASSAY OF HAPTOGLOBIN QUANT: CPT | Performed by: NURSE PRACTITIONER

## 2020-01-19 PROCEDURE — 84295 ASSAY OF SERUM SODIUM: CPT | Performed by: INTERNAL MEDICINE

## 2020-01-19 PROCEDURE — 84466 ASSAY OF TRANSFERRIN: CPT | Performed by: NURSE PRACTITIONER

## 2020-01-19 PROCEDURE — 94799 UNLISTED PULMONARY SVC/PX: CPT

## 2020-01-19 PROCEDURE — 80053 COMPREHEN METABOLIC PANEL: CPT | Performed by: NURSE PRACTITIONER

## 2020-01-19 PROCEDURE — 84100 ASSAY OF PHOSPHORUS: CPT | Performed by: DIETITIAN, REGISTERED

## 2020-01-19 PROCEDURE — 87449 NOS EACH ORGANISM AG IA: CPT | Performed by: NURSE PRACTITIONER

## 2020-01-19 PROCEDURE — 85384 FIBRINOGEN ACTIVITY: CPT | Performed by: NURSE PRACTITIONER

## 2020-01-19 PROCEDURE — 25010000002 VANCOMYCIN 10 G RECONSTITUTED SOLUTION: Performed by: NURSE PRACTITIONER

## 2020-01-19 PROCEDURE — 87899 AGENT NOS ASSAY W/OPTIC: CPT | Performed by: NURSE PRACTITIONER

## 2020-01-19 PROCEDURE — 83615 LACTATE (LD) (LDH) ENZYME: CPT | Performed by: NURSE PRACTITIONER

## 2020-01-19 PROCEDURE — 85045 AUTOMATED RETICULOCYTE COUNT: CPT | Performed by: NURSE PRACTITIONER

## 2020-01-19 PROCEDURE — 85025 COMPLETE CBC W/AUTO DIFF WBC: CPT | Performed by: NURSE PRACTITIONER

## 2020-01-19 PROCEDURE — 82533 TOTAL CORTISOL: CPT | Performed by: INTERNAL MEDICINE

## 2020-01-19 PROCEDURE — 80202 ASSAY OF VANCOMYCIN: CPT | Performed by: NURSE PRACTITIONER

## 2020-01-19 PROCEDURE — 0099U HC BIOFIRE FILMARRAY RESP PANEL 1: CPT | Performed by: NURSE PRACTITIONER

## 2020-01-19 PROCEDURE — 82607 VITAMIN B-12: CPT | Performed by: NURSE PRACTITIONER

## 2020-01-19 PROCEDURE — 25010000002 IRON SUCROSE PER 1 MG: Performed by: NURSE PRACTITIONER

## 2020-01-19 PROCEDURE — 84443 ASSAY THYROID STIM HORMONE: CPT | Performed by: INTERNAL MEDICINE

## 2020-01-19 PROCEDURE — 83540 ASSAY OF IRON: CPT | Performed by: NURSE PRACTITIONER

## 2020-01-19 PROCEDURE — 83735 ASSAY OF MAGNESIUM: CPT | Performed by: DIETITIAN, REGISTERED

## 2020-01-19 PROCEDURE — 82728 ASSAY OF FERRITIN: CPT | Performed by: NURSE PRACTITIONER

## 2020-01-19 PROCEDURE — 82746 ASSAY OF FOLIC ACID SERUM: CPT | Performed by: NURSE PRACTITIONER

## 2020-01-19 PROCEDURE — 87324 CLOSTRIDIUM AG IA: CPT | Performed by: NURSE PRACTITIONER

## 2020-01-19 PROCEDURE — 99223 1ST HOSP IP/OBS HIGH 75: CPT | Performed by: INTERNAL MEDICINE

## 2020-01-19 PROCEDURE — 84550 ASSAY OF BLOOD/URIC ACID: CPT | Performed by: INTERNAL MEDICINE

## 2020-01-19 PROCEDURE — 82274 ASSAY TEST FOR BLOOD FECAL: CPT | Performed by: INTERNAL MEDICINE

## 2020-01-19 RX ADMIN — METOCLOPRAMIDE HYDROCHLORIDE 5 MG: 5 SOLUTION ORAL at 20:33

## 2020-01-19 RX ADMIN — CEFEPIME 2 G: 2 INJECTION, POWDER, FOR SOLUTION INTRAVENOUS at 15:02

## 2020-01-19 RX ADMIN — METOCLOPRAMIDE HYDROCHLORIDE 5 MG: 5 SOLUTION ORAL at 11:14

## 2020-01-19 RX ADMIN — Medication 10 ML: at 08:27

## 2020-01-19 RX ADMIN — AMLODIPINE BESYLATE 5 MG: 5 TABLET ORAL at 06:05

## 2020-01-19 RX ADMIN — CEFEPIME 2 G: 2 INJECTION, POWDER, FOR SOLUTION INTRAVENOUS at 23:48

## 2020-01-19 RX ADMIN — METOCLOPRAMIDE HYDROCHLORIDE 5 MG: 5 SOLUTION ORAL at 08:29

## 2020-01-19 RX ADMIN — ACETYLCYSTEINE 2 ML: 200 SOLUTION ORAL; RESPIRATORY (INHALATION) at 06:34

## 2020-01-19 RX ADMIN — CEFEPIME 2 G: 2 INJECTION, POWDER, FOR SOLUTION INTRAVENOUS at 06:05

## 2020-01-19 RX ADMIN — LANSOPRAZOLE 30 MG: KIT at 08:27

## 2020-01-19 RX ADMIN — IPRATROPIUM BROMIDE AND ALBUTEROL SULFATE 3 ML: .5; 3 SOLUTION RESPIRATORY (INHALATION) at 15:13

## 2020-01-19 RX ADMIN — IPRATROPIUM BROMIDE AND ALBUTEROL SULFATE 3 ML: .5; 3 SOLUTION RESPIRATORY (INHALATION) at 11:10

## 2020-01-19 RX ADMIN — LISINOPRIL 40 MG: 20 TABLET ORAL at 06:05

## 2020-01-19 RX ADMIN — ACETYLCYSTEINE 2 ML: 200 SOLUTION ORAL; RESPIRATORY (INHALATION) at 18:25

## 2020-01-19 RX ADMIN — IPRATROPIUM BROMIDE AND ALBUTEROL SULFATE 3 ML: .5; 3 SOLUTION RESPIRATORY (INHALATION) at 03:30

## 2020-01-19 RX ADMIN — OXYCODONE HYDROCHLORIDE 10 MG: 5 TABLET ORAL at 19:06

## 2020-01-19 RX ADMIN — OXYCODONE HYDROCHLORIDE 10 MG: 5 TABLET ORAL at 23:49

## 2020-01-19 RX ADMIN — VANCOMYCIN HYDROCHLORIDE 1250 MG: 10 INJECTION, POWDER, LYOPHILIZED, FOR SOLUTION INTRAVENOUS at 20:32

## 2020-01-19 RX ADMIN — IPRATROPIUM BROMIDE AND ALBUTEROL SULFATE 3 ML: .5; 3 SOLUTION RESPIRATORY (INHALATION) at 06:34

## 2020-01-19 RX ADMIN — IPRATROPIUM BROMIDE AND ALBUTEROL SULFATE 3 ML: .5; 3 SOLUTION RESPIRATORY (INHALATION) at 23:23

## 2020-01-19 RX ADMIN — IPRATROPIUM BROMIDE AND ALBUTEROL SULFATE 3 ML: .5; 3 SOLUTION RESPIRATORY (INHALATION) at 18:26

## 2020-01-19 RX ADMIN — Medication: at 08:34

## 2020-01-19 RX ADMIN — GABAPENTIN 600 MG: 300 CAPSULE ORAL at 14:59

## 2020-01-19 RX ADMIN — Medication: at 01:18

## 2020-01-19 RX ADMIN — IRON SUCROSE 300 MG: 20 INJECTION, SOLUTION INTRAVENOUS at 17:04

## 2020-01-19 RX ADMIN — ANASTROZOLE 1 MG: 1 TABLET ORAL at 08:27

## 2020-01-19 RX ADMIN — GUAIFENESIN 600 MG: 100 SOLUTION ORAL at 18:35

## 2020-01-19 RX ADMIN — GABAPENTIN 600 MG: 300 CAPSULE ORAL at 05:58

## 2020-01-19 RX ADMIN — Medication 10 ML: at 20:34

## 2020-01-19 RX ADMIN — VANCOMYCIN HYDROCHLORIDE 1250 MG: 10 INJECTION, POWDER, LYOPHILIZED, FOR SOLUTION INTRAVENOUS at 09:18

## 2020-01-19 RX ADMIN — Medication 250 MG: at 08:27

## 2020-01-19 RX ADMIN — LORAZEPAM 0.5 MG: 0.5 TABLET ORAL at 19:06

## 2020-01-19 RX ADMIN — METOCLOPRAMIDE HYDROCHLORIDE 5 MG: 5 SOLUTION ORAL at 18:36

## 2020-01-19 RX ADMIN — GUAIFENESIN 600 MG: 100 SOLUTION ORAL at 20:33

## 2020-01-19 RX ADMIN — LEVOTHYROXINE SODIUM 175 MCG: 175 TABLET ORAL at 08:27

## 2020-01-19 RX ADMIN — GABAPENTIN 600 MG: 300 CAPSULE ORAL at 21:30

## 2020-01-19 RX ADMIN — GUAIFENESIN 600 MG: 100 SOLUTION ORAL at 08:27

## 2020-01-19 RX ADMIN — GUAIFENESIN 600 MG: 100 SOLUTION ORAL at 11:14

## 2020-01-19 RX ADMIN — OXYCODONE HYDROCHLORIDE 10 MG: 5 TABLET ORAL at 11:10

## 2020-01-19 NOTE — OUTREACH NOTE
COPD/PN Week 2 Survey      Responses   Facility patient discharged from?  [facility]   Does the patient have one of the following disease processes/diagnoses(primary or secondary)?  COPD/Pneumonia   Was the primary reason for admission:  Pneumonia   Week 2 attempt successful?  No   Revoke  Readmitted          Jesi Curtis RN

## 2020-01-20 ENCOUNTER — APPOINTMENT (OUTPATIENT)
Dept: CT IMAGING | Facility: HOSPITAL | Age: 70
End: 2020-01-20

## 2020-01-20 ENCOUNTER — APPOINTMENT (OUTPATIENT)
Dept: GENERAL RADIOLOGY | Facility: HOSPITAL | Age: 70
End: 2020-01-20

## 2020-01-20 ENCOUNTER — INPATIENT HOSPITAL (OUTPATIENT)
Dept: URBAN - METROPOLITAN AREA HOSPITAL 84 | Facility: HOSPITAL | Age: 70
End: 2020-01-20
Payer: MEDICARE

## 2020-01-20 DIAGNOSIS — D64.89 OTHER SPECIFIED ANEMIAS: ICD-10-CM

## 2020-01-20 DIAGNOSIS — R19.5 OTHER FECAL ABNORMALITIES: ICD-10-CM

## 2020-01-20 DIAGNOSIS — J18.9 PNEUMONIA, UNSPECIFIED ORGANISM: ICD-10-CM

## 2020-01-20 DIAGNOSIS — K21.9 GASTRO-ESOPHAGEAL REFLUX DISEASE WITHOUT ESOPHAGITIS: ICD-10-CM

## 2020-01-20 PROBLEM — D64.9 ANEMIA: Status: ACTIVE | Noted: 2020-01-17

## 2020-01-20 LAB
ADV 40+41 DNA STL QL NAA+NON-PROBE: NOT DETECTED
ALBUMIN SERPL-MCNC: 2.9 G/DL (ref 3.5–5.2)
ALBUMIN/GLOB SERPL: 1 G/DL
ALP SERPL-CCNC: 145 U/L (ref 39–117)
ALT SERPL W P-5'-P-CCNC: 40 U/L (ref 1–33)
ANION GAP SERPL CALCULATED.3IONS-SCNC: 9 MMOL/L (ref 5–15)
AST SERPL-CCNC: 35 U/L (ref 1–32)
ASTRO TYP 1-8 RNA STL QL NAA+NON-PROBE: NOT DETECTED
BACTERIA SPEC RESP CULT: NORMAL
BASOPHILS # BLD AUTO: 0 10*3/MM3 (ref 0–0.2)
BASOPHILS NFR BLD AUTO: 0.1 % (ref 0–1.5)
BILIRUB SERPL-MCNC: <0.2 MG/DL (ref 0.2–1.2)
BUN BLD-MCNC: 10 MG/DL (ref 8–23)
BUN/CREAT SERPL: 28.6 (ref 7–25)
C CAYETANENSIS DNA STL QL NAA+NON-PROBE: NOT DETECTED
CALCIUM SPEC-SCNC: 8.4 MG/DL (ref 8.6–10.5)
CAMPY SP DNA.DIARRHEA STL QL NAA+PROBE: NOT DETECTED
CHLORIDE SERPL-SCNC: 86 MMOL/L (ref 98–107)
CO2 SERPL-SCNC: 34 MMOL/L (ref 22–29)
CREAT BLD-MCNC: 0.35 MG/DL (ref 0.57–1)
CRYPTOSP STL CULT: NOT DETECTED
DEPRECATED RDW RBC AUTO: 46.4 FL (ref 37–54)
E COLI DNA SPEC QL NAA+PROBE: NOT DETECTED
E HISTOLYT AG STL-ACNC: NOT DETECTED
EAEC PAA PLAS AGGR+AATA ST NAA+NON-PRB: NOT DETECTED
EC STX1 + STX2 GENES STL NAA+PROBE: NOT DETECTED
EOSINOPHIL # BLD AUTO: 0.1 10*3/MM3 (ref 0–0.4)
EOSINOPHIL NFR BLD AUTO: 1 % (ref 0.3–6.2)
EPEC EAE GENE STL QL NAA+NON-PROBE: NOT DETECTED
ERYTHROCYTE [DISTWIDTH] IN BLOOD BY AUTOMATED COUNT: 16.5 % (ref 12.3–15.4)
ETEC LTA+ST1A+ST1B TOX ST NAA+NON-PROBE: NOT DETECTED
FOLATE SERPL-MCNC: >20 NG/ML (ref 4.78–24.2)
G LAMBLIA DNA SPEC QL NAA+PROBE: NOT DETECTED
GFR SERPL CREATININE-BSD FRML MDRD: >150 ML/MIN/1.73
GLOBULIN UR ELPH-MCNC: 2.9 GM/DL
GLUCOSE BLD-MCNC: 195 MG/DL (ref 65–99)
GLUCOSE BLDC GLUCOMTR-MCNC: 150 MG/DL (ref 70–105)
GRAM STN SPEC: NORMAL
HAPTOGLOB SERPL-MCNC: 516 MG/DL (ref 30–200)
HCT VFR BLD AUTO: 24 % (ref 34–46.6)
HGB BLD-MCNC: 8.1 G/DL (ref 12–15.9)
LYMPHOCYTES # BLD AUTO: 0.3 10*3/MM3 (ref 0.7–3.1)
LYMPHOCYTES NFR BLD AUTO: 3.5 % (ref 19.6–45.3)
MAGNESIUM SERPL-MCNC: 1.9 MG/DL (ref 1.6–2.4)
MCH RBC QN AUTO: 26.8 PG (ref 26.6–33)
MCHC RBC AUTO-ENTMCNC: 33.7 G/DL (ref 31.5–35.7)
MCV RBC AUTO: 79.6 FL (ref 79–97)
MONOCYTES # BLD AUTO: 0.5 10*3/MM3 (ref 0.1–0.9)
MONOCYTES NFR BLD AUTO: 6 % (ref 5–12)
MRSA DNA SPEC QL NAA+PROBE: NORMAL
NEUTROPHILS # BLD AUTO: 7.5 10*3/MM3 (ref 1.7–7)
NEUTROPHILS NFR BLD AUTO: 89.4 % (ref 42.7–76)
NOROVIRUS GI+II RNA STL QL NAA+NON-PROBE: NOT DETECTED
NRBC BLD AUTO-RTO: 0.1 /100 WBC (ref 0–0.2)
P SHIGELLOIDES DNA STL QL NAA+PROBE: NOT DETECTED
PHOSPHATE SERPL-MCNC: 2.4 MG/DL (ref 2.5–4.5)
PLATELET # BLD AUTO: 304 10*3/MM3 (ref 140–450)
PMV BLD AUTO: 7 FL (ref 6–12)
POTASSIUM BLD-SCNC: 3.6 MMOL/L (ref 3.5–5.2)
PROT SERPL-MCNC: 5.8 G/DL (ref 6–8.5)
RBC # BLD AUTO: 3.02 10*6/MM3 (ref 3.77–5.28)
RV RNA STL NAA+PROBE: NOT DETECTED
SALMONELLA DNA SPEC QL NAA+PROBE: NOT DETECTED
SAPO I+II+IV+V RNA STL QL NAA+NON-PROBE: NOT DETECTED
SHIGELLA SP+EIEC IPAH STL QL NAA+PROBE: NOT DETECTED
SODIUM BLD-SCNC: 129 MMOL/L (ref 136–145)
V CHOLERAE DNA SPEC QL NAA+PROBE: NOT DETECTED
VIBRIO DNA SPEC NAA+PROBE: NOT DETECTED
VIT B12 BLD-MCNC: 699 PG/ML (ref 211–946)
WBC NRBC COR # BLD: 8.4 10*3/MM3 (ref 3.4–10.8)
YERSINIA STL CULT: NOT DETECTED

## 2020-01-20 PROCEDURE — 71250 CT THORAX DX C-: CPT

## 2020-01-20 PROCEDURE — 82962 GLUCOSE BLOOD TEST: CPT

## 2020-01-20 PROCEDURE — 99222 1ST HOSP IP/OBS MODERATE 55: CPT | Performed by: NURSE PRACTITIONER

## 2020-01-20 PROCEDURE — 99233 SBSQ HOSP IP/OBS HIGH 50: CPT | Performed by: INTERNAL MEDICINE

## 2020-01-20 PROCEDURE — 85025 COMPLETE CBC W/AUTO DIFF WBC: CPT | Performed by: NURSE PRACTITIONER

## 2020-01-20 PROCEDURE — 94799 UNLISTED PULMONARY SVC/PX: CPT

## 2020-01-20 PROCEDURE — 84100 ASSAY OF PHOSPHORUS: CPT | Performed by: DIETITIAN, REGISTERED

## 2020-01-20 PROCEDURE — 83735 ASSAY OF MAGNESIUM: CPT | Performed by: DIETITIAN, REGISTERED

## 2020-01-20 PROCEDURE — 25010000002 IRON SUCROSE PER 1 MG: Performed by: NURSE PRACTITIONER

## 2020-01-20 PROCEDURE — 80053 COMPREHEN METABOLIC PANEL: CPT | Performed by: INTERNAL MEDICINE

## 2020-01-20 PROCEDURE — 25010000002 CEFEPIME PER 500 MG: Performed by: NURSE PRACTITIONER

## 2020-01-20 PROCEDURE — 25010000002 VANCOMYCIN 10 G RECONSTITUTED SOLUTION: Performed by: NURSE PRACTITIONER

## 2020-01-20 PROCEDURE — 71045 X-RAY EXAM CHEST 1 VIEW: CPT

## 2020-01-20 PROCEDURE — 94760 N-INVAS EAR/PLS OXIMETRY 1: CPT

## 2020-01-20 PROCEDURE — 87641 MR-STAPH DNA AMP PROBE: CPT | Performed by: INTERNAL MEDICINE

## 2020-01-20 RX ORDER — LABETALOL HYDROCHLORIDE 5 MG/ML
10 INJECTION, SOLUTION INTRAVENOUS ONCE
Status: COMPLETED | OUTPATIENT
Start: 2020-01-20 | End: 2020-01-20

## 2020-01-20 RX ORDER — GUAR GUM
1 PACKET (EA) ORAL DAILY
Status: DISCONTINUED | OUTPATIENT
Start: 2020-01-20 | End: 2020-01-25 | Stop reason: HOSPADM

## 2020-01-20 RX ORDER — DIPHENOXYLATE HCL/ATROPINE 2.5-.025/5
5 LIQUID (ML) ORAL 4 TIMES DAILY PRN
Status: DISCONTINUED | OUTPATIENT
Start: 2020-01-20 | End: 2020-01-20

## 2020-01-20 RX ORDER — CHOLESTYRAMINE LIGHT 4 G/5.7G
1 POWDER, FOR SUSPENSION ORAL EVERY 12 HOURS SCHEDULED
Status: DISCONTINUED | OUTPATIENT
Start: 2020-01-20 | End: 2020-01-25 | Stop reason: HOSPADM

## 2020-01-20 RX ORDER — DIPHENOXYLATE HYDROCHLORIDE AND ATROPINE SULFATE 2.5; .025 MG/1; MG/1
1 TABLET ORAL 4 TIMES DAILY PRN
Status: DISCONTINUED | OUTPATIENT
Start: 2020-01-20 | End: 2020-01-25 | Stop reason: HOSPADM

## 2020-01-20 RX ADMIN — GABAPENTIN 600 MG: 300 CAPSULE ORAL at 06:56

## 2020-01-20 RX ADMIN — CHOLESTYRAMINE LIGHT 4 G: 4 POWDER, FOR SUSPENSION ORAL at 17:04

## 2020-01-20 RX ADMIN — METOCLOPRAMIDE HYDROCHLORIDE 5 MG: 5 SOLUTION ORAL at 17:24

## 2020-01-20 RX ADMIN — SODIUM PHOSPHATE, MONOBASIC, MONOHYDRATE 15 MMOL: 276; 142 INJECTION, SOLUTION INTRAVENOUS at 17:25

## 2020-01-20 RX ADMIN — METOCLOPRAMIDE HYDROCHLORIDE 5 MG: 5 SOLUTION ORAL at 12:14

## 2020-01-20 RX ADMIN — AMLODIPINE BESYLATE 5 MG: 5 TABLET ORAL at 06:56

## 2020-01-20 RX ADMIN — IRON SUCROSE 300 MG: 20 INJECTION, SOLUTION INTRAVENOUS at 13:29

## 2020-01-20 RX ADMIN — IPRATROPIUM BROMIDE AND ALBUTEROL SULFATE 3 ML: .5; 3 SOLUTION RESPIRATORY (INHALATION) at 18:37

## 2020-01-20 RX ADMIN — Medication 250 MG: at 09:17

## 2020-01-20 RX ADMIN — FENTANYL 1 PATCH: 50 PATCH TRANSDERMAL at 22:24

## 2020-01-20 RX ADMIN — LANSOPRAZOLE 30 MG: KIT at 10:07

## 2020-01-20 RX ADMIN — METOCLOPRAMIDE HYDROCHLORIDE 5 MG: 5 SOLUTION ORAL at 22:18

## 2020-01-20 RX ADMIN — ANASTROZOLE 1 MG: 1 TABLET ORAL at 10:08

## 2020-01-20 RX ADMIN — GUAIFENESIN 600 MG: 100 SOLUTION ORAL at 12:14

## 2020-01-20 RX ADMIN — CHOLESTYRAMINE LIGHT 4 G: 4 POWDER, FOR SUSPENSION ORAL at 22:18

## 2020-01-20 RX ADMIN — ACETYLCYSTEINE 2 ML: 200 SOLUTION ORAL; RESPIRATORY (INHALATION) at 18:37

## 2020-01-20 RX ADMIN — VANCOMYCIN HYDROCHLORIDE 1250 MG: 10 INJECTION, POWDER, LYOPHILIZED, FOR SOLUTION INTRAVENOUS at 09:17

## 2020-01-20 RX ADMIN — METOCLOPRAMIDE HYDROCHLORIDE 5 MG: 5 SOLUTION ORAL at 06:56

## 2020-01-20 RX ADMIN — GABAPENTIN 600 MG: 300 CAPSULE ORAL at 22:20

## 2020-01-20 RX ADMIN — GUAIFENESIN 600 MG: 100 SOLUTION ORAL at 22:19

## 2020-01-20 RX ADMIN — DIPHENOXYLATE HYDROCHLORIDE AND ATROPINE SULFATE 1 TABLET: 2.5; .025 TABLET ORAL at 17:04

## 2020-01-20 RX ADMIN — IPRATROPIUM BROMIDE AND ALBUTEROL SULFATE 3 ML: .5; 3 SOLUTION RESPIRATORY (INHALATION) at 02:31

## 2020-01-20 RX ADMIN — CEFEPIME 2 G: 2 INJECTION, POWDER, FOR SOLUTION INTRAVENOUS at 06:55

## 2020-01-20 RX ADMIN — LISINOPRIL 40 MG: 20 TABLET ORAL at 06:56

## 2020-01-20 RX ADMIN — ACETYLCYSTEINE 2 ML: 200 SOLUTION ORAL; RESPIRATORY (INHALATION) at 07:11

## 2020-01-20 RX ADMIN — GUAIFENESIN 600 MG: 100 SOLUTION ORAL at 17:04

## 2020-01-20 RX ADMIN — OXYCODONE HYDROCHLORIDE 10 MG: 5 TABLET ORAL at 22:19

## 2020-01-20 RX ADMIN — LEVOTHYROXINE SODIUM 175 MCG: 175 TABLET ORAL at 09:17

## 2020-01-20 RX ADMIN — Medication 1 PACKET: at 17:24

## 2020-01-20 RX ADMIN — GUAIFENESIN 600 MG: 100 SOLUTION ORAL at 09:17

## 2020-01-20 RX ADMIN — CEFEPIME 2 G: 2 INJECTION, POWDER, FOR SOLUTION INTRAVENOUS at 17:01

## 2020-01-20 RX ADMIN — IPRATROPIUM BROMIDE AND ALBUTEROL SULFATE 3 ML: .5; 3 SOLUTION RESPIRATORY (INHALATION) at 14:37

## 2020-01-20 RX ADMIN — IPRATROPIUM BROMIDE AND ALBUTEROL SULFATE 3 ML: .5; 3 SOLUTION RESPIRATORY (INHALATION) at 07:11

## 2020-01-20 RX ADMIN — LABETALOL 20 MG/4 ML (5 MG/ML) INTRAVENOUS SYRINGE 10 MG: at 01:19

## 2020-01-20 RX ADMIN — IPRATROPIUM BROMIDE AND ALBUTEROL SULFATE 3 ML: .5; 3 SOLUTION RESPIRATORY (INHALATION) at 11:04

## 2020-01-20 RX ADMIN — VANCOMYCIN HYDROCHLORIDE 1250 MG: 10 INJECTION, POWDER, LYOPHILIZED, FOR SOLUTION INTRAVENOUS at 22:18

## 2020-01-21 ENCOUNTER — ANESTHESIA EVENT (OUTPATIENT)
Dept: GASTROENTEROLOGY | Facility: HOSPITAL | Age: 70
End: 2020-01-21

## 2020-01-21 ENCOUNTER — APPOINTMENT (OUTPATIENT)
Dept: CARDIOLOGY | Facility: HOSPITAL | Age: 70
End: 2020-01-21

## 2020-01-21 ENCOUNTER — ANESTHESIA (OUTPATIENT)
Dept: GASTROENTEROLOGY | Facility: HOSPITAL | Age: 70
End: 2020-01-21

## 2020-01-21 ENCOUNTER — INPATIENT HOSPITAL (OUTPATIENT)
Dept: URBAN - METROPOLITAN AREA HOSPITAL 84 | Facility: HOSPITAL | Age: 70
End: 2020-01-21
Payer: MEDICARE

## 2020-01-21 DIAGNOSIS — D64.89 OTHER SPECIFIED ANEMIAS: ICD-10-CM

## 2020-01-21 DIAGNOSIS — R19.5 OTHER FECAL ABNORMALITIES: ICD-10-CM

## 2020-01-21 DIAGNOSIS — K21.9 GASTRO-ESOPHAGEAL REFLUX DISEASE WITHOUT ESOPHAGITIS: ICD-10-CM

## 2020-01-21 LAB
ANION GAP SERPL CALCULATED.3IONS-SCNC: 10 MMOL/L (ref 5–15)
BASOPHILS # BLD AUTO: 0 10*3/MM3 (ref 0–0.2)
BASOPHILS NFR BLD AUTO: 0.2 % (ref 0–1.5)
BUN BLD-MCNC: 8 MG/DL (ref 8–23)
BUN/CREAT SERPL: 27.6 (ref 7–25)
C DIFF GDH STL QL: NEGATIVE
CALCIUM SPEC-SCNC: 8.9 MG/DL (ref 8.6–10.5)
CHLORIDE SERPL-SCNC: 90 MMOL/L (ref 98–107)
CO2 SERPL-SCNC: 32 MMOL/L (ref 22–29)
CREAT BLD-MCNC: 0.29 MG/DL (ref 0.57–1)
DEPRECATED RDW RBC AUTO: 48.1 FL (ref 37–54)
EOSINOPHIL # BLD AUTO: 0.2 10*3/MM3 (ref 0–0.4)
EOSINOPHIL NFR BLD AUTO: 2.1 % (ref 0.3–6.2)
ERYTHROCYTE [DISTWIDTH] IN BLOOD BY AUTOMATED COUNT: 16.7 % (ref 12.3–15.4)
GFR SERPL CREATININE-BSD FRML MDRD: >150 ML/MIN/1.73
GLUCOSE BLD-MCNC: 94 MG/DL (ref 65–99)
GLUCOSE BLDC GLUCOMTR-MCNC: 105 MG/DL (ref 70–105)
GLUCOSE BLDC GLUCOMTR-MCNC: 113 MG/DL (ref 70–105)
GLUCOSE BLDC GLUCOMTR-MCNC: 117 MG/DL (ref 70–105)
GLUCOSE BLDC GLUCOMTR-MCNC: 142 MG/DL (ref 70–105)
GLUCOSE BLDC GLUCOMTR-MCNC: 91 MG/DL (ref 70–105)
HCT VFR BLD AUTO: 25.6 % (ref 34–46.6)
HGB BLD-MCNC: 8.4 G/DL (ref 12–15.9)
LYMPHOCYTES # BLD AUTO: 0.6 10*3/MM3 (ref 0.7–3.1)
LYMPHOCYTES NFR BLD AUTO: 8 % (ref 19.6–45.3)
MAGNESIUM SERPL-MCNC: 1.8 MG/DL (ref 1.6–2.4)
MCH RBC QN AUTO: 26.6 PG (ref 26.6–33)
MCHC RBC AUTO-ENTMCNC: 32.8 G/DL (ref 31.5–35.7)
MCV RBC AUTO: 81.3 FL (ref 79–97)
MONOCYTES # BLD AUTO: 0.6 10*3/MM3 (ref 0.1–0.9)
MONOCYTES NFR BLD AUTO: 7.6 % (ref 5–12)
NEUTROPHILS # BLD AUTO: 6.4 10*3/MM3 (ref 1.7–7)
NEUTROPHILS NFR BLD AUTO: 82.1 % (ref 42.7–76)
NRBC BLD AUTO-RTO: 0 /100 WBC (ref 0–0.2)
PLATELET # BLD AUTO: 331 10*3/MM3 (ref 140–450)
PMV BLD AUTO: 7.2 FL (ref 6–12)
POTASSIUM BLD-SCNC: 3 MMOL/L (ref 3.5–5.2)
POTASSIUM BLD-SCNC: 3.5 MMOL/L (ref 3.5–5.2)
RBC # BLD AUTO: 3.16 10*6/MM3 (ref 3.77–5.28)
SODIUM BLD-SCNC: 131 MMOL/L (ref 136–145)
SODIUM BLD-SCNC: 132 MMOL/L (ref 136–145)
SODIUM BLD-SCNC: 133 MMOL/L (ref 136–145)
TSH SERPL DL<=0.05 MIU/L-ACNC: 0.35 UIU/ML (ref 0.27–4.2)
VANCOMYCIN TROUGH SERPL-MCNC: 11.3 MCG/ML (ref 5–20)
WBC NRBC COR # BLD: 7.8 10*3/MM3 (ref 3.4–10.8)

## 2020-01-21 PROCEDURE — 25010000003 LIDOCAINE 1 % SOLUTION: Performed by: INTERNAL MEDICINE

## 2020-01-21 PROCEDURE — 25010000002 PROPOFOL 10 MG/ML EMULSION: Performed by: ANESTHESIOLOGY

## 2020-01-21 PROCEDURE — 25010000002 IRON SUCROSE PER 1 MG: Performed by: NURSE PRACTITIONER

## 2020-01-21 PROCEDURE — 94799 UNLISTED PULMONARY SVC/PX: CPT

## 2020-01-21 PROCEDURE — 88108 CYTOPATH CONCENTRATE TECH: CPT | Performed by: INTERNAL MEDICINE

## 2020-01-21 PROCEDURE — 84295 ASSAY OF SERUM SODIUM: CPT | Performed by: INTERNAL MEDICINE

## 2020-01-21 PROCEDURE — 80048 BASIC METABOLIC PNL TOTAL CA: CPT | Performed by: INTERNAL MEDICINE

## 2020-01-21 PROCEDURE — 83735 ASSAY OF MAGNESIUM: CPT | Performed by: NURSE PRACTITIONER

## 2020-01-21 PROCEDURE — 87252 VIRUS INOCULATION TISSUE: CPT | Performed by: INTERNAL MEDICINE

## 2020-01-21 PROCEDURE — 0B9G8ZX DRAINAGE OF LEFT UPPER LUNG LOBE, VIA NATURAL OR ARTIFICIAL OPENING ENDOSCOPIC, DIAGNOSTIC: ICD-10-PCS | Performed by: INTERNAL MEDICINE

## 2020-01-21 PROCEDURE — 87206 SMEAR FLUORESCENT/ACID STAI: CPT | Performed by: INTERNAL MEDICINE

## 2020-01-21 PROCEDURE — 25010000003 POTASSIUM CHLORIDE 10 MEQ/100ML SOLUTION: Performed by: INTERNAL MEDICINE

## 2020-01-21 PROCEDURE — 87205 SMEAR GRAM STAIN: CPT | Performed by: INTERNAL MEDICINE

## 2020-01-21 PROCEDURE — 25010000002 CEFEPIME PER 500 MG: Performed by: NURSE PRACTITIONER

## 2020-01-21 PROCEDURE — 84132 ASSAY OF SERUM POTASSIUM: CPT | Performed by: INTERNAL MEDICINE

## 2020-01-21 PROCEDURE — 43235 EGD DIAGNOSTIC BRUSH WASH: CPT | Performed by: INTERNAL MEDICINE

## 2020-01-21 PROCEDURE — 87102 FUNGUS ISOLATION CULTURE: CPT | Performed by: INTERNAL MEDICINE

## 2020-01-21 PROCEDURE — 93005 ELECTROCARDIOGRAM TRACING: CPT | Performed by: INTERNAL MEDICINE

## 2020-01-21 PROCEDURE — 85025 COMPLETE CBC W/AUTO DIFF WBC: CPT | Performed by: NURSE PRACTITIONER

## 2020-01-21 PROCEDURE — 25010000002 VANCOMYCIN 10 G RECONSTITUTED SOLUTION: Performed by: NURSE PRACTITIONER

## 2020-01-21 PROCEDURE — 87281 PNEUMOCYSTIS CARINII AG IF: CPT | Performed by: INTERNAL MEDICINE

## 2020-01-21 PROCEDURE — 99223 1ST HOSP IP/OBS HIGH 75: CPT | Performed by: INTERNAL MEDICINE

## 2020-01-21 PROCEDURE — 87633 RESP VIRUS 12-25 TARGETS: CPT | Performed by: INTERNAL MEDICINE

## 2020-01-21 PROCEDURE — 0DJ08ZZ INSPECTION OF UPPER INTESTINAL TRACT, VIA NATURAL OR ARTIFICIAL OPENING ENDOSCOPIC: ICD-10-PCS | Performed by: INTERNAL MEDICINE

## 2020-01-21 PROCEDURE — 93306 TTE W/DOPPLER COMPLETE: CPT

## 2020-01-21 PROCEDURE — 87071 CULTURE AEROBIC QUANT OTHER: CPT | Performed by: INTERNAL MEDICINE

## 2020-01-21 PROCEDURE — 82962 GLUCOSE BLOOD TEST: CPT

## 2020-01-21 PROCEDURE — 80202 ASSAY OF VANCOMYCIN: CPT | Performed by: INTERNAL MEDICINE

## 2020-01-21 PROCEDURE — 99233 SBSQ HOSP IP/OBS HIGH 50: CPT | Performed by: INTERNAL MEDICINE

## 2020-01-21 PROCEDURE — 84443 ASSAY THYROID STIM HORMONE: CPT | Performed by: INTERNAL MEDICINE

## 2020-01-21 PROCEDURE — 87116 MYCOBACTERIA CULTURE: CPT | Performed by: INTERNAL MEDICINE

## 2020-01-21 RX ORDER — LIDOCAINE 50 MG/G
OINTMENT TOPICAL
Status: COMPLETED
Start: 2020-01-21 | End: 2020-01-21

## 2020-01-21 RX ORDER — LIDOCAINE 50 MG/G
OINTMENT TOPICAL AS NEEDED
Status: DISCONTINUED | OUTPATIENT
Start: 2020-01-21 | End: 2020-01-21 | Stop reason: HOSPADM

## 2020-01-21 RX ORDER — POTASSIUM CHLORIDE 1.5 G/1.77G
40 POWDER, FOR SOLUTION ORAL AS NEEDED
Status: DISCONTINUED | OUTPATIENT
Start: 2020-01-21 | End: 2020-01-25 | Stop reason: HOSPADM

## 2020-01-21 RX ORDER — POTASSIUM CHLORIDE 20 MEQ/1
40 TABLET, EXTENDED RELEASE ORAL AS NEEDED
Status: DISCONTINUED | OUTPATIENT
Start: 2020-01-21 | End: 2020-01-25 | Stop reason: HOSPADM

## 2020-01-21 RX ORDER — POTASSIUM CHLORIDE 7.45 MG/ML
10 INJECTION INTRAVENOUS
Status: DISCONTINUED | OUTPATIENT
Start: 2020-01-21 | End: 2020-01-21

## 2020-01-21 RX ORDER — PROPOFOL 10 MG/ML
VIAL (ML) INTRAVENOUS AS NEEDED
Status: DISCONTINUED | OUTPATIENT
Start: 2020-01-21 | End: 2020-01-21 | Stop reason: SURG

## 2020-01-21 RX ORDER — LIDOCAINE HYDROCHLORIDE 10 MG/ML
INJECTION, SOLUTION INFILTRATION; PERINEURAL AS NEEDED
Status: DISCONTINUED | OUTPATIENT
Start: 2020-01-21 | End: 2020-01-21 | Stop reason: HOSPADM

## 2020-01-21 RX ORDER — SODIUM CHLORIDE 0.9 % (FLUSH) 0.9 %
10 SYRINGE (ML) INJECTION AS NEEDED
Status: DISCONTINUED | OUTPATIENT
Start: 2020-01-21 | End: 2020-01-21 | Stop reason: HOSPADM

## 2020-01-21 RX ORDER — SODIUM CHLORIDE 0.9 % (FLUSH) 0.9 %
3 SYRINGE (ML) INJECTION EVERY 12 HOURS SCHEDULED
Status: DISCONTINUED | OUTPATIENT
Start: 2020-01-21 | End: 2020-01-21 | Stop reason: HOSPADM

## 2020-01-21 RX ORDER — SACCHAROMYCES BOULARDII 250 MG
500 CAPSULE ORAL 2 TIMES DAILY
Status: DISCONTINUED | OUTPATIENT
Start: 2020-01-21 | End: 2020-01-25 | Stop reason: HOSPADM

## 2020-01-21 RX ORDER — POTASSIUM CHLORIDE 7.45 MG/ML
10 INJECTION INTRAVENOUS
Status: DISCONTINUED | OUTPATIENT
Start: 2020-01-21 | End: 2020-01-25 | Stop reason: HOSPADM

## 2020-01-21 RX ORDER — DILTIAZEM HYDROCHLORIDE 5 MG/ML
10 INJECTION INTRAVENOUS ONCE
Status: COMPLETED | OUTPATIENT
Start: 2020-01-21 | End: 2020-01-21

## 2020-01-21 RX ORDER — LIDOCAINE HYDROCHLORIDE 10 MG/ML
INJECTION, SOLUTION EPIDURAL; INFILTRATION; INTRACAUDAL; PERINEURAL
Status: COMPLETED
Start: 2020-01-21 | End: 2020-01-21

## 2020-01-21 RX ORDER — DILTIAZEM HCL IN NACL,ISO-OSM 125 MG/125
10 PLASTIC BAG, INJECTION (ML) INTRAVENOUS
Status: DISCONTINUED | OUTPATIENT
Start: 2020-01-21 | End: 2020-01-23

## 2020-01-21 RX ORDER — LIDOCAINE HYDROCHLORIDE 10 MG/ML
INJECTION, SOLUTION EPIDURAL; INFILTRATION; INTRACAUDAL; PERINEURAL AS NEEDED
Status: DISCONTINUED | OUTPATIENT
Start: 2020-01-21 | End: 2020-01-21 | Stop reason: SURG

## 2020-01-21 RX ADMIN — METOPROLOL TARTRATE 25 MG: 25 TABLET ORAL at 10:18

## 2020-01-21 RX ADMIN — CEFEPIME 2 G: 2 INJECTION, POWDER, FOR SOLUTION INTRAVENOUS at 00:39

## 2020-01-21 RX ADMIN — CEFEPIME 2 G: 2 INJECTION, POWDER, FOR SOLUTION INTRAVENOUS at 10:16

## 2020-01-21 RX ADMIN — GABAPENTIN 600 MG: 300 CAPSULE ORAL at 21:03

## 2020-01-21 RX ADMIN — DILTIAZEM HYDROCHLORIDE 10 MG: 5 INJECTION INTRAVENOUS at 05:09

## 2020-01-21 RX ADMIN — METOCLOPRAMIDE HYDROCHLORIDE 5 MG: 5 SOLUTION ORAL at 12:22

## 2020-01-21 RX ADMIN — METOCLOPRAMIDE HYDROCHLORIDE 5 MG: 5 SOLUTION ORAL at 17:29

## 2020-01-21 RX ADMIN — POTASSIUM CHLORIDE 40 MEQ: 1.5 POWDER, FOR SOLUTION ORAL at 20:30

## 2020-01-21 RX ADMIN — OXYCODONE HYDROCHLORIDE 10 MG: 5 TABLET ORAL at 17:30

## 2020-01-21 RX ADMIN — Medication 10 ML: at 08:20

## 2020-01-21 RX ADMIN — PROPOFOL 50 MG: 10 INJECTION, EMULSION INTRAVENOUS at 13:36

## 2020-01-21 RX ADMIN — IPRATROPIUM BROMIDE AND ALBUTEROL SULFATE 3 ML: .5; 3 SOLUTION RESPIRATORY (INHALATION) at 08:08

## 2020-01-21 RX ADMIN — PROPOFOL 50 MG: 10 INJECTION, EMULSION INTRAVENOUS at 13:51

## 2020-01-21 RX ADMIN — APIXABAN 5 MG: 5 TABLET, FILM COATED ORAL at 20:31

## 2020-01-21 RX ADMIN — GUAIFENESIN 600 MG: 100 SOLUTION ORAL at 20:31

## 2020-01-21 RX ADMIN — POTASSIUM CHLORIDE 10 MEQ: 7.46 INJECTION, SOLUTION INTRAVENOUS at 12:21

## 2020-01-21 RX ADMIN — METOPROLOL TARTRATE 25 MG: 25 TABLET ORAL at 20:31

## 2020-01-21 RX ADMIN — PROPOFOL 50 MG: 10 INJECTION, EMULSION INTRAVENOUS at 13:46

## 2020-01-21 RX ADMIN — LEVOTHYROXINE SODIUM 175 MCG: 175 TABLET ORAL at 10:25

## 2020-01-21 RX ADMIN — IPRATROPIUM BROMIDE AND ALBUTEROL SULFATE 3 ML: .5; 3 SOLUTION RESPIRATORY (INHALATION) at 03:27

## 2020-01-21 RX ADMIN — IPRATROPIUM BROMIDE AND ALBUTEROL SULFATE 3 ML: .5; 3 SOLUTION RESPIRATORY (INHALATION) at 19:03

## 2020-01-21 RX ADMIN — CHOLESTYRAMINE LIGHT 4 G: 4 POWDER, FOR SUSPENSION ORAL at 21:02

## 2020-01-21 RX ADMIN — Medication 10 ML: at 20:30

## 2020-01-21 RX ADMIN — GABAPENTIN 600 MG: 300 CAPSULE ORAL at 15:53

## 2020-01-21 RX ADMIN — IPRATROPIUM BROMIDE AND ALBUTEROL SULFATE 3 ML: .5; 3 SOLUTION RESPIRATORY (INHALATION) at 16:18

## 2020-01-21 RX ADMIN — IPRATROPIUM BROMIDE AND ALBUTEROL SULFATE 3 ML: .5; 3 SOLUTION RESPIRATORY (INHALATION) at 12:16

## 2020-01-21 RX ADMIN — CHOLESTYRAMINE LIGHT 4 G: 4 POWDER, FOR SUSPENSION ORAL at 10:18

## 2020-01-21 RX ADMIN — GUAIFENESIN 600 MG: 100 SOLUTION ORAL at 08:19

## 2020-01-21 RX ADMIN — Medication 500 MG: at 20:31

## 2020-01-21 RX ADMIN — ACETYLCYSTEINE 2 ML: 200 SOLUTION ORAL; RESPIRATORY (INHALATION) at 08:08

## 2020-01-21 RX ADMIN — LISINOPRIL 40 MG: 20 TABLET ORAL at 08:18

## 2020-01-21 RX ADMIN — SODIUM CHLORIDE 100 ML: 0.9 INJECTION, SOLUTION INTRAVENOUS at 13:57

## 2020-01-21 RX ADMIN — GABAPENTIN 600 MG: 300 CAPSULE ORAL at 08:19

## 2020-01-21 RX ADMIN — VANCOMYCIN HYDROCHLORIDE 1250 MG: 10 INJECTION, POWDER, LYOPHILIZED, FOR SOLUTION INTRAVENOUS at 20:30

## 2020-01-21 RX ADMIN — PROPOFOL 50 MG: 10 INJECTION, EMULSION INTRAVENOUS at 13:34

## 2020-01-21 RX ADMIN — METOCLOPRAMIDE HYDROCHLORIDE 5 MG: 5 SOLUTION ORAL at 08:19

## 2020-01-21 RX ADMIN — ACETYLCYSTEINE 2 ML: 200 SOLUTION ORAL; RESPIRATORY (INHALATION) at 19:03

## 2020-01-21 RX ADMIN — IRON SUCROSE 300 MG: 20 INJECTION, SOLUTION INTRAVENOUS at 15:54

## 2020-01-21 RX ADMIN — IPRATROPIUM BROMIDE AND ALBUTEROL SULFATE 3 ML: .5; 3 SOLUTION RESPIRATORY (INHALATION) at 23:50

## 2020-01-21 RX ADMIN — POTASSIUM CHLORIDE 40 MEQ: 1.5 POWDER, FOR SOLUTION ORAL at 17:30

## 2020-01-21 RX ADMIN — METOCLOPRAMIDE HYDROCHLORIDE 5 MG: 5 SOLUTION ORAL at 20:32

## 2020-01-21 RX ADMIN — LIDOCAINE HYDROCHLORIDE 40 MG: 10 INJECTION, SOLUTION EPIDURAL; INFILTRATION; INTRACAUDAL; PERINEURAL at 13:34

## 2020-01-21 RX ADMIN — VANCOMYCIN HYDROCHLORIDE 1250 MG: 10 INJECTION, POWDER, LYOPHILIZED, FOR SOLUTION INTRAVENOUS at 12:22

## 2020-01-21 RX ADMIN — GUAIFENESIN 600 MG: 100 SOLUTION ORAL at 12:22

## 2020-01-21 RX ADMIN — AMLODIPINE BESYLATE 5 MG: 5 TABLET ORAL at 08:19

## 2020-01-21 RX ADMIN — Medication 250 MG: at 10:18

## 2020-01-21 RX ADMIN — LIDOCAINE: 50 OINTMENT TOPICAL at 15:54

## 2020-01-21 RX ADMIN — GUAIFENESIN 600 MG: 100 SOLUTION ORAL at 17:29

## 2020-01-21 RX ADMIN — CEFEPIME 2 G: 2 INJECTION, POWDER, FOR SOLUTION INTRAVENOUS at 15:53

## 2020-01-21 RX ADMIN — Medication 1 PACKET: at 10:10

## 2020-01-21 RX ADMIN — DILTIAZEM HYDROCHLORIDE 5 MG/HR: 5 INJECTION INTRAVENOUS at 06:00

## 2020-01-21 RX ADMIN — IPRATROPIUM BROMIDE AND ALBUTEROL SULFATE 3 ML: .5; 3 SOLUTION RESPIRATORY (INHALATION) at 00:07

## 2020-01-21 NOTE — ANESTHESIA POSTPROCEDURE EVALUATION
Patient: Elma Sheikh    Procedure Summary     Date:  01/21/20 Room / Location:  [facility] ENDOSCOPY 1 / [facility] ENDOSCOPY    Anesthesia Start:  1334 Anesthesia Stop:  1403    Procedures:       ESOPHAGOGASTRODUODENOSCOPY (N/A )      BRONCHOSCOPY WITH BRONCHIAL ALVEOLAR LAVAGE (N/A Bronchus) Diagnosis:       Anemia, unspecified type      HAP (hospital-acquired pneumonia)      (Anemia, unspecified type [D64.9] )      (HAP (hospital-acquired pneumonia) [J18.9, Y95])    Surgeon:  Ac Leonard MD; Kristen Everett MD Provider:  Caryn Carlson MD    Anesthesia Type:  MAC ASA Status:  4          Anesthesia Type: MAC    Vitals  Vitals Value Taken Time   /76 1/21/2020  2:50 PM   Temp     Pulse 92 1/21/2020  3:01 PM   Resp 15 1/21/2020  2:50 PM   SpO2 95 % 1/21/2020  3:01 PM   Vitals shown include unvalidated device data.        Post Anesthesia Care and Evaluation    Patient location during evaluation: PACU  Patient participation: complete - patient participated  Level of consciousness: awake  Pain score: 0 (See nurse's notes for pain score)  Pain management: adequate  Airway patency: patent  Anesthetic complications: No anesthetic complications  PONV Status: none  Cardiovascular status: acceptable  Respiratory status: acceptable  Hydration status: acceptable    Comments: Patient seen and examined postoperatively; vital signs stable; SpO2 greater than or equal to 90%; cardiopulmonary status stable; nausea/vomiting adequately controlled; pain adequately controlled; no apparent anesthesia complications; patient discharged from anesthesia care when discharge criteria were met

## 2020-01-21 NOTE — ANESTHESIA PREPROCEDURE EVALUATION
Anesthesia Evaluation     Patient summary reviewed and Nursing notes reviewed   NPO Solid Status: > 8 hours  NPO Liquid Status: > 8 hours           Airway   Mallampati: III  Dental      Pulmonary    (+) pneumonia , shortness of breath,   Cardiovascular     (+) hypertension well controlled, valvular problems/murmurs,       Neuro/Psych- negative ROS  GI/Hepatic/Renal/Endo    (+)  GERD well controlled,      Musculoskeletal     Abdominal    Substance History - negative use     OB/GYN negative ob/gyn ROS         Other   arthritis,    history of cancer        Phys Exam Other: Trach   Prosthetic tongue              Anesthesia Plan    ASA 4     MAC     intravenous induction     Anesthetic plan, all risks, benefits, and alternatives have been provided, discussed and informed consent has been obtained with: patient.

## 2020-01-22 LAB
ANION GAP SERPL CALCULATED.3IONS-SCNC: 11 MMOL/L (ref 5–15)
ANION GAP SERPL CALCULATED.3IONS-SCNC: 8 MMOL/L (ref 5–15)
BACTERIA SPEC AEROBE CULT: NORMAL
BACTERIA SPEC AEROBE CULT: NORMAL
BASOPHILS # BLD AUTO: 0 10*3/MM3 (ref 0–0.2)
BASOPHILS # BLD AUTO: 0.1 10*3/MM3 (ref 0–0.2)
BASOPHILS NFR BLD AUTO: 0.3 % (ref 0–1.5)
BASOPHILS NFR BLD AUTO: 0.8 % (ref 0–1.5)
BH CV ECHO MEAS - ACS: 1.5 CM
BH CV ECHO MEAS - AO MAX PG (FULL): 30.5 MMHG
BH CV ECHO MEAS - AO MAX PG: 36.1 MMHG
BH CV ECHO MEAS - AO MEAN PG (FULL): 18.5 MMHG
BH CV ECHO MEAS - AO MEAN PG: 21.6 MMHG
BH CV ECHO MEAS - AO ROOT AREA (BSA CORRECTED): 1.7
BH CV ECHO MEAS - AO ROOT AREA: 6.9 CM^2
BH CV ECHO MEAS - AO ROOT DIAM: 3 CM
BH CV ECHO MEAS - AO V2 MAX: 291.8 CM/SEC
BH CV ECHO MEAS - AO V2 MEAN: 215.9 CM/SEC
BH CV ECHO MEAS - AO V2 VTI: 61.4 CM
BH CV ECHO MEAS - AVA(I,A): 1.2 CM^2
BH CV ECHO MEAS - AVA(I,D): 1.2 CM^2
BH CV ECHO MEAS - AVA(V,A): 1.3 CM^2
BH CV ECHO MEAS - AVA(V,D): 1.3 CM^2
BH CV ECHO MEAS - BSA(HAYCOCK): 1.7 M^2
BH CV ECHO MEAS - BSA: 1.7 M^2
BH CV ECHO MEAS - BZI_BMI: 24.5 KILOGRAMS/M^2
BH CV ECHO MEAS - BZI_METRIC_HEIGHT: 162.6 CM
BH CV ECHO MEAS - BZI_METRIC_WEIGHT: 64.9 KG
BH CV ECHO MEAS - EDV(CUBED): 97.6 ML
BH CV ECHO MEAS - EDV(MOD-SP4): 79.3 ML
BH CV ECHO MEAS - EDV(TEICH): 97.5 ML
BH CV ECHO MEAS - EF(CUBED): 75.9 %
BH CV ECHO MEAS - EF(MOD-BP): 64 %
BH CV ECHO MEAS - EF(MOD-SP4): 64.3 %
BH CV ECHO MEAS - EF(TEICH): 68 %
BH CV ECHO MEAS - ESV(CUBED): 23.5 ML
BH CV ECHO MEAS - ESV(MOD-SP4): 28.3 ML
BH CV ECHO MEAS - ESV(TEICH): 31.2 ML
BH CV ECHO MEAS - FS: 37.8 %
BH CV ECHO MEAS - IVS/LVPW: 0.97
BH CV ECHO MEAS - IVSD: 0.94 CM
BH CV ECHO MEAS - LA DIMENSION(2D): 3.4 CM
BH CV ECHO MEAS - LV DIASTOLIC VOL/BSA (35-75): 46.7 ML/M^2
BH CV ECHO MEAS - LV MASS(C)D: 149.9 GRAMS
BH CV ECHO MEAS - LV MASS(C)DI: 88.4 GRAMS/M^2
BH CV ECHO MEAS - LV MAX PG: 5.5 MMHG
BH CV ECHO MEAS - LV MEAN PG: 3.1 MMHG
BH CV ECHO MEAS - LV SYSTOLIC VOL/BSA (12-30): 16.7 ML/M^2
BH CV ECHO MEAS - LV V1 MAX: 117.6 CM/SEC
BH CV ECHO MEAS - LV V1 MEAN: 82.2 CM/SEC
BH CV ECHO MEAS - LV V1 VTI: 24.2 CM
BH CV ECHO MEAS - LVIDD: 4.6 CM
BH CV ECHO MEAS - LVIDS: 2.9 CM
BH CV ECHO MEAS - LVOT AREA: 3.1 CM^2
BH CV ECHO MEAS - LVOT DIAM: 2 CM
BH CV ECHO MEAS - LVPWD: 0.97 CM
BH CV ECHO MEAS - MR MAX PG: 59 MMHG
BH CV ECHO MEAS - MR MAX VEL: 384.1 CM/SEC
BH CV ECHO MEAS - MV A MAX VEL: 133.8 CM/SEC
BH CV ECHO MEAS - MV DEC SLOPE: 625.4 CM/SEC^2
BH CV ECHO MEAS - MV DEC TIME: 0.19 SEC
BH CV ECHO MEAS - MV E MAX VEL: 117.4 CM/SEC
BH CV ECHO MEAS - MV E/A: 0.88
BH CV ECHO MEAS - MV MAX PG: 8.3 MMHG
BH CV ECHO MEAS - MV MEAN PG: 3.6 MMHG
BH CV ECHO MEAS - MV V2 MAX: 143.9 CM/SEC
BH CV ECHO MEAS - MV V2 MEAN: 85.9 CM/SEC
BH CV ECHO MEAS - MV V2 VTI: 36.5 CM
BH CV ECHO MEAS - MVA(VTI): 2.1 CM^2
BH CV ECHO MEAS - PA MAX PG (FULL): 1.6 MMHG
BH CV ECHO MEAS - PA MAX PG: 2.7 MMHG
BH CV ECHO MEAS - PA V2 MAX: 82 CM/SEC
BH CV ECHO MEAS - RAP SYSTOLE: 3 MMHG
BH CV ECHO MEAS - RV MAX PG: 1.1 MMHG
BH CV ECHO MEAS - RV MEAN PG: 0.68 MMHG
BH CV ECHO MEAS - RV V1 MAX: 52 CM/SEC
BH CV ECHO MEAS - RV V1 MEAN: 39.3 CM/SEC
BH CV ECHO MEAS - RV V1 VTI: 12.1 CM
BH CV ECHO MEAS - RVDD: 2.5 CM
BH CV ECHO MEAS - RVSP: 34.4 MMHG
BH CV ECHO MEAS - SI(AO): 250.5 ML/M^2
BH CV ECHO MEAS - SI(CUBED): 43.7 ML/M^2
BH CV ECHO MEAS - SI(LVOT): 44.5 ML/M^2
BH CV ECHO MEAS - SI(MOD-SP4): 30 ML/M^2
BH CV ECHO MEAS - SI(TEICH): 39.1 ML/M^2
BH CV ECHO MEAS - SV(AO): 424.9 ML
BH CV ECHO MEAS - SV(CUBED): 74.1 ML
BH CV ECHO MEAS - SV(LVOT): 75.5 ML
BH CV ECHO MEAS - SV(MOD-SP4): 51 ML
BH CV ECHO MEAS - SV(TEICH): 66.3 ML
BH CV ECHO MEAS - TR MAX VEL: 280.2 CM/SEC
BUN BLD-MCNC: 12 MG/DL (ref 8–23)
BUN BLD-MCNC: 13 MG/DL (ref 8–23)
BUN/CREAT SERPL: 30.8 (ref 7–25)
BUN/CREAT SERPL: 34.2 (ref 7–25)
CALCIUM SPEC-SCNC: 8.6 MG/DL (ref 8.6–10.5)
CALCIUM SPEC-SCNC: 8.9 MG/DL (ref 8.6–10.5)
CHLORIDE SERPL-SCNC: 90 MMOL/L (ref 98–107)
CHLORIDE SERPL-SCNC: 91 MMOL/L (ref 98–107)
CO2 SERPL-SCNC: 28 MMOL/L (ref 22–29)
CO2 SERPL-SCNC: 32 MMOL/L (ref 22–29)
CREAT BLD-MCNC: 0.38 MG/DL (ref 0.57–1)
CREAT BLD-MCNC: 0.39 MG/DL (ref 0.57–1)
DEPRECATED RDW RBC AUTO: 49.4 FL (ref 37–54)
DEPRECATED RDW RBC AUTO: 51.2 FL (ref 37–54)
EOSINOPHIL # BLD AUTO: 0.2 10*3/MM3 (ref 0–0.4)
EOSINOPHIL # BLD AUTO: 0.5 10*3/MM3 (ref 0–0.4)
EOSINOPHIL NFR BLD AUTO: 1.9 % (ref 0.3–6.2)
EOSINOPHIL NFR BLD AUTO: 5.5 % (ref 0.3–6.2)
ERYTHROCYTE [DISTWIDTH] IN BLOOD BY AUTOMATED COUNT: 17 % (ref 12.3–15.4)
ERYTHROCYTE [DISTWIDTH] IN BLOOD BY AUTOMATED COUNT: 17.1 % (ref 12.3–15.4)
GFR SERPL CREATININE-BSD FRML MDRD: >150 ML/MIN/1.73
GFR SERPL CREATININE-BSD FRML MDRD: >150 ML/MIN/1.73
GLUCOSE BLD-MCNC: 154 MG/DL (ref 65–99)
GLUCOSE BLD-MCNC: 159 MG/DL (ref 65–99)
GLUCOSE BLDC GLUCOMTR-MCNC: 125 MG/DL (ref 70–105)
GLUCOSE BLDC GLUCOMTR-MCNC: 135 MG/DL (ref 70–105)
GLUCOSE BLDC GLUCOMTR-MCNC: 136 MG/DL (ref 70–105)
HCT VFR BLD AUTO: 27 % (ref 34–46.6)
HCT VFR BLD AUTO: 27.8 % (ref 34–46.6)
HGB BLD-MCNC: 9 G/DL (ref 12–15.9)
HGB BLD-MCNC: 9.1 G/DL (ref 12–15.9)
LYMPHOCYTES # BLD AUTO: 0.6 10*3/MM3 (ref 0.7–3.1)
LYMPHOCYTES # BLD AUTO: 0.7 10*3/MM3 (ref 0.7–3.1)
LYMPHOCYTES NFR BLD AUTO: 6.2 % (ref 19.6–45.3)
LYMPHOCYTES NFR BLD AUTO: 8.6 % (ref 19.6–45.3)
MAGNESIUM SERPL-MCNC: 1.7 MG/DL (ref 1.6–2.4)
MCH RBC QN AUTO: 27.4 PG (ref 26.6–33)
MCH RBC QN AUTO: 27.7 PG (ref 26.6–33)
MCHC RBC AUTO-ENTMCNC: 32.4 G/DL (ref 31.5–35.7)
MCHC RBC AUTO-ENTMCNC: 33.7 G/DL (ref 31.5–35.7)
MCV RBC AUTO: 82.3 FL (ref 79–97)
MCV RBC AUTO: 84.6 FL (ref 79–97)
MONOCYTES # BLD AUTO: 0.6 10*3/MM3 (ref 0.1–0.9)
MONOCYTES # BLD AUTO: 0.6 10*3/MM3 (ref 0.1–0.9)
MONOCYTES NFR BLD AUTO: 6.5 % (ref 5–12)
MONOCYTES NFR BLD AUTO: 7.3 % (ref 5–12)
NEUTROPHILS # BLD AUTO: 6.7 10*3/MM3 (ref 1.7–7)
NEUTROPHILS # BLD AUTO: 7.4 10*3/MM3 (ref 1.7–7)
NEUTROPHILS NFR BLD AUTO: 81.4 % (ref 42.7–76)
NEUTROPHILS NFR BLD AUTO: 81.5 % (ref 42.7–76)
NRBC BLD AUTO-RTO: 0 /100 WBC (ref 0–0.2)
NRBC BLD AUTO-RTO: 0.9 /100 WBC (ref 0–0.2)
P JIROVECII AG SPEC QL IF: NEGATIVE
PLATELET # BLD AUTO: 310 10*3/MM3 (ref 140–450)
PLATELET # BLD AUTO: 319 10*3/MM3 (ref 140–450)
PMV BLD AUTO: 6.8 FL (ref 6–12)
PMV BLD AUTO: 7.7 FL (ref 6–12)
POTASSIUM BLD-SCNC: 3.4 MMOL/L (ref 3.5–5.2)
POTASSIUM BLD-SCNC: 3.6 MMOL/L (ref 3.5–5.2)
RBC # BLD AUTO: 3.28 10*6/MM3 (ref 3.77–5.28)
RBC # BLD AUTO: 3.29 10*6/MM3 (ref 3.77–5.28)
SODIUM BLD-SCNC: 130 MMOL/L (ref 136–145)
SODIUM BLD-SCNC: 130 MMOL/L (ref 136–145)
TROPONIN T SERPL-MCNC: <0.01 NG/ML (ref 0–0.03)
WBC NRBC COR # BLD: 8.3 10*3/MM3 (ref 3.4–10.8)
WBC NRBC COR # BLD: 9.1 10*3/MM3 (ref 3.4–10.8)

## 2020-01-22 PROCEDURE — 93005 ELECTROCARDIOGRAM TRACING: CPT | Performed by: INTERNAL MEDICINE

## 2020-01-22 PROCEDURE — 84484 ASSAY OF TROPONIN QUANT: CPT | Performed by: INTERNAL MEDICINE

## 2020-01-22 PROCEDURE — 85025 COMPLETE CBC W/AUTO DIFF WBC: CPT | Performed by: INTERNAL MEDICINE

## 2020-01-22 PROCEDURE — 94799 UNLISTED PULMONARY SVC/PX: CPT

## 2020-01-22 PROCEDURE — 82962 GLUCOSE BLOOD TEST: CPT

## 2020-01-22 PROCEDURE — 25010000002 CEFEPIME PER 500 MG: Performed by: NURSE PRACTITIONER

## 2020-01-22 PROCEDURE — 99232 SBSQ HOSP IP/OBS MODERATE 35: CPT | Performed by: INTERNAL MEDICINE

## 2020-01-22 PROCEDURE — 99233 SBSQ HOSP IP/OBS HIGH 50: CPT | Performed by: INTERNAL MEDICINE

## 2020-01-22 PROCEDURE — 25010000002 VANCOMYCIN 10 G RECONSTITUTED SOLUTION: Performed by: NURSE PRACTITIONER

## 2020-01-22 PROCEDURE — 25010000002 CEFEPIME PER 500 MG: Performed by: INTERNAL MEDICINE

## 2020-01-22 PROCEDURE — 80048 BASIC METABOLIC PNL TOTAL CA: CPT | Performed by: INTERNAL MEDICINE

## 2020-01-22 PROCEDURE — 83735 ASSAY OF MAGNESIUM: CPT | Performed by: INTERNAL MEDICINE

## 2020-01-22 PROCEDURE — 93306 TTE W/DOPPLER COMPLETE: CPT | Performed by: INTERNAL MEDICINE

## 2020-01-22 RX ORDER — DILTIAZEM HYDROCHLORIDE 5 MG/ML
20 INJECTION INTRAVENOUS ONCE
Status: COMPLETED | OUTPATIENT
Start: 2020-01-22 | End: 2020-01-22

## 2020-01-22 RX ORDER — DIGOXIN 0.25 MG/ML
INJECTION INTRAMUSCULAR; INTRAVENOUS
Status: COMPLETED
Start: 2020-01-22 | End: 2020-01-23

## 2020-01-22 RX ORDER — GABAPENTIN 300 MG/1
600 CAPSULE ORAL EVERY 8 HOURS SCHEDULED
Status: DISCONTINUED | OUTPATIENT
Start: 2020-01-22 | End: 2020-01-25 | Stop reason: HOSPADM

## 2020-01-22 RX ORDER — GABAPENTIN 250 MG/5ML
600 SOLUTION ORAL EVERY 8 HOURS SCHEDULED
Status: DISCONTINUED | OUTPATIENT
Start: 2020-01-22 | End: 2020-01-22

## 2020-01-22 RX ORDER — DILTIAZEM HYDROCHLORIDE 5 MG/ML
INJECTION INTRAVENOUS
Status: COMPLETED
Start: 2020-01-22 | End: 2020-01-22

## 2020-01-22 RX ADMIN — Medication 500 MG: at 09:24

## 2020-01-22 RX ADMIN — GUAIFENESIN 600 MG: 100 SOLUTION ORAL at 19:11

## 2020-01-22 RX ADMIN — CEFEPIME 2 G: 2 INJECTION, POWDER, FOR SOLUTION INTRAVENOUS at 01:16

## 2020-01-22 RX ADMIN — ACETYLCYSTEINE 2 ML: 200 SOLUTION ORAL; RESPIRATORY (INHALATION) at 06:22

## 2020-01-22 RX ADMIN — IPRATROPIUM BROMIDE AND ALBUTEROL SULFATE 3 ML: .5; 3 SOLUTION RESPIRATORY (INHALATION) at 18:52

## 2020-01-22 RX ADMIN — METOCLOPRAMIDE HYDROCHLORIDE 5 MG: 5 SOLUTION ORAL at 09:24

## 2020-01-22 RX ADMIN — Medication 1 PACKET: at 10:04

## 2020-01-22 RX ADMIN — LANSOPRAZOLE 30 MG: KIT at 09:24

## 2020-01-22 RX ADMIN — DILTIAZEM HYDROCHLORIDE 10 MG/HR: 5 INJECTION INTRAVENOUS at 19:47

## 2020-01-22 RX ADMIN — GUAIFENESIN 600 MG: 100 SOLUTION ORAL at 09:23

## 2020-01-22 RX ADMIN — CEFEPIME HYDROCHLORIDE 2 G: 2 INJECTION, POWDER, FOR SOLUTION INTRAVENOUS at 22:17

## 2020-01-22 RX ADMIN — IPRATROPIUM BROMIDE AND ALBUTEROL SULFATE 3 ML: .5; 3 SOLUTION RESPIRATORY (INHALATION) at 14:56

## 2020-01-22 RX ADMIN — METOCLOPRAMIDE HYDROCHLORIDE 5 MG: 5 SOLUTION ORAL at 11:38

## 2020-01-22 RX ADMIN — Medication 10 ML: at 09:24

## 2020-01-22 RX ADMIN — APIXABAN 5 MG: 5 TABLET, FILM COATED ORAL at 21:57

## 2020-01-22 RX ADMIN — CHOLESTYRAMINE LIGHT 4 G: 4 POWDER, FOR SUSPENSION ORAL at 21:57

## 2020-01-22 RX ADMIN — CHOLESTYRAMINE LIGHT 4 G: 4 POWDER, FOR SUSPENSION ORAL at 09:23

## 2020-01-22 RX ADMIN — Medication 500 MG: at 21:56

## 2020-01-22 RX ADMIN — OXYCODONE HYDROCHLORIDE 10 MG: 5 TABLET ORAL at 22:07

## 2020-01-22 RX ADMIN — ANASTROZOLE 1 MG: 1 TABLET ORAL at 09:25

## 2020-01-22 RX ADMIN — GABAPENTIN 600 MG: 300 CAPSULE ORAL at 21:57

## 2020-01-22 RX ADMIN — DILTIAZEM HYDROCHLORIDE 20 MG: 5 INJECTION INTRAVENOUS at 19:43

## 2020-01-22 RX ADMIN — METOPROLOL TARTRATE 25 MG: 25 TABLET ORAL at 19:47

## 2020-01-22 RX ADMIN — METOCLOPRAMIDE HYDROCHLORIDE 5 MG: 5 SOLUTION ORAL at 19:11

## 2020-01-22 RX ADMIN — IPRATROPIUM BROMIDE AND ALBUTEROL SULFATE 3 ML: .5; 3 SOLUTION RESPIRATORY (INHALATION) at 03:30

## 2020-01-22 RX ADMIN — CEFEPIME HYDROCHLORIDE 2 G: 2 INJECTION, POWDER, FOR SOLUTION INTRAVENOUS at 15:30

## 2020-01-22 RX ADMIN — GABAPENTIN 600 MG: 300 CAPSULE ORAL at 15:30

## 2020-01-22 RX ADMIN — LISINOPRIL 40 MG: 20 TABLET ORAL at 09:24

## 2020-01-22 RX ADMIN — VANCOMYCIN HYDROCHLORIDE 1250 MG: 10 INJECTION, POWDER, LYOPHILIZED, FOR SOLUTION INTRAVENOUS at 11:38

## 2020-01-22 RX ADMIN — GUAIFENESIN 600 MG: 100 SOLUTION ORAL at 21:56

## 2020-01-22 RX ADMIN — AMLODIPINE BESYLATE 5 MG: 5 TABLET ORAL at 09:24

## 2020-01-22 RX ADMIN — GUAIFENESIN 600 MG: 100 SOLUTION ORAL at 11:38

## 2020-01-22 RX ADMIN — GABAPENTIN 600 MG: 300 CAPSULE ORAL at 05:21

## 2020-01-22 RX ADMIN — APIXABAN 5 MG: 5 TABLET, FILM COATED ORAL at 09:24

## 2020-01-22 RX ADMIN — ACETYLCYSTEINE 2 ML: 200 SOLUTION ORAL; RESPIRATORY (INHALATION) at 18:52

## 2020-01-22 RX ADMIN — METOPROLOL TARTRATE 25 MG: 25 TABLET ORAL at 09:24

## 2020-01-22 RX ADMIN — IPRATROPIUM BROMIDE AND ALBUTEROL SULFATE 3 ML: .5; 3 SOLUTION RESPIRATORY (INHALATION) at 23:13

## 2020-01-22 RX ADMIN — POTASSIUM CHLORIDE 40 MEQ: 1.5 POWDER, FOR SOLUTION ORAL at 21:58

## 2020-01-22 RX ADMIN — LEVOTHYROXINE SODIUM 175 MCG: 175 TABLET ORAL at 09:24

## 2020-01-22 RX ADMIN — METOCLOPRAMIDE HYDROCHLORIDE 5 MG: 5 SOLUTION ORAL at 21:56

## 2020-01-22 RX ADMIN — LORAZEPAM 0.5 MG: 0.5 TABLET ORAL at 22:07

## 2020-01-22 RX ADMIN — IPRATROPIUM BROMIDE AND ALBUTEROL SULFATE 3 ML: .5; 3 SOLUTION RESPIRATORY (INHALATION) at 06:22

## 2020-01-23 LAB
ANISOCYTOSIS BLD QL: ABNORMAL
BACTERIA SPEC AEROBE CULT: NORMAL
BACTERIA SPEC AEROBE CULT: NORMAL
DEPRECATED RDW RBC AUTO: 47.7 FL (ref 37–54)
EOSINOPHIL # BLD MANUAL: 0.08 10*3/MM3 (ref 0–0.4)
EOSINOPHIL NFR BLD MANUAL: 1 % (ref 0.3–6.2)
ERYTHROCYTE [DISTWIDTH] IN BLOOD BY AUTOMATED COUNT: 16.6 % (ref 12.3–15.4)
GLUCOSE BLDC GLUCOMTR-MCNC: 118 MG/DL (ref 70–105)
GLUCOSE BLDC GLUCOMTR-MCNC: 128 MG/DL (ref 70–105)
GLUCOSE BLDC GLUCOMTR-MCNC: 139 MG/DL (ref 70–105)
GLUCOSE BLDC GLUCOMTR-MCNC: 153 MG/DL (ref 70–105)
GRAM STN SPEC: NORMAL
GRAM STN SPEC: NORMAL
HCT VFR BLD AUTO: 24.3 % (ref 34–46.6)
HGB BLD-MCNC: 8.1 G/DL (ref 12–15.9)
LAB AP CASE REPORT: NORMAL
LAB AP DIAGNOSIS COMMENT: NORMAL
LYMPHOCYTES # BLD MANUAL: 0.42 10*3/MM3 (ref 0.7–3.1)
LYMPHOCYTES NFR BLD MANUAL: 5 % (ref 19.6–45.3)
LYMPHOCYTES NFR BLD MANUAL: 6 % (ref 5–12)
MCH RBC QN AUTO: 27.4 PG (ref 26.6–33)
MCHC RBC AUTO-ENTMCNC: 33.5 G/DL (ref 31.5–35.7)
MCV RBC AUTO: 81.7 FL (ref 79–97)
MONOCYTES # BLD AUTO: 0.5 10*3/MM3 (ref 0.1–0.9)
NEUTROPHILS # BLD AUTO: 7.22 10*3/MM3 (ref 1.7–7)
NEUTROPHILS NFR BLD MANUAL: 87 % (ref 42.7–76)
PATH REPORT.FINAL DX SPEC: NORMAL
PATH REPORT.GROSS SPEC: NORMAL
PLAT MORPH BLD: NORMAL
PLATELET # BLD AUTO: 308 10*3/MM3 (ref 140–450)
PMV BLD AUTO: 6.7 FL (ref 6–12)
POIKILOCYTOSIS BLD QL SMEAR: ABNORMAL
POTASSIUM BLD-SCNC: 4.7 MMOL/L (ref 3.5–5.2)
RBC # BLD AUTO: 2.97 10*6/MM3 (ref 3.77–5.28)
SCAN SLIDE: NORMAL
VARIANT LYMPHS NFR BLD MANUAL: 1 % (ref 0–5)
WBC MORPH BLD: NORMAL
WBC NRBC COR # BLD: 8.3 10*3/MM3 (ref 3.4–10.8)

## 2020-01-23 PROCEDURE — 94799 UNLISTED PULMONARY SVC/PX: CPT

## 2020-01-23 PROCEDURE — 25010000002 CEFEPIME PER 500 MG: Performed by: INTERNAL MEDICINE

## 2020-01-23 PROCEDURE — 85025 COMPLETE CBC W/AUTO DIFF WBC: CPT | Performed by: NURSE PRACTITIONER

## 2020-01-23 PROCEDURE — 99232 SBSQ HOSP IP/OBS MODERATE 35: CPT | Performed by: INTERNAL MEDICINE

## 2020-01-23 PROCEDURE — 99233 SBSQ HOSP IP/OBS HIGH 50: CPT | Performed by: INTERNAL MEDICINE

## 2020-01-23 PROCEDURE — 25010000002 DIGOXIN PER 500 MCG

## 2020-01-23 PROCEDURE — 82962 GLUCOSE BLOOD TEST: CPT

## 2020-01-23 PROCEDURE — 84132 ASSAY OF SERUM POTASSIUM: CPT | Performed by: INTERNAL MEDICINE

## 2020-01-23 PROCEDURE — 94618 PULMONARY STRESS TESTING: CPT

## 2020-01-23 PROCEDURE — 85007 BL SMEAR W/DIFF WBC COUNT: CPT | Performed by: NURSE PRACTITIONER

## 2020-01-23 RX ORDER — DIGOXIN 0.25 MG/ML
250 INJECTION INTRAMUSCULAR; INTRAVENOUS ONCE
Status: DISCONTINUED | OUTPATIENT
Start: 2020-01-22 | End: 2020-01-25 | Stop reason: HOSPADM

## 2020-01-23 RX ORDER — DIGOXIN 0.25 MG/ML
250 INJECTION INTRAMUSCULAR; INTRAVENOUS ONCE
Status: DISCONTINUED | OUTPATIENT
Start: 2020-01-23 | End: 2020-01-23

## 2020-01-23 RX ORDER — DILTIAZEM HYDROCHLORIDE 60 MG/1
60 TABLET, FILM COATED ORAL 3 TIMES DAILY
Status: DISCONTINUED | OUTPATIENT
Start: 2020-01-23 | End: 2020-01-25 | Stop reason: HOSPADM

## 2020-01-23 RX ADMIN — CEFEPIME HYDROCHLORIDE 2 G: 2 INJECTION, POWDER, FOR SOLUTION INTRAVENOUS at 22:47

## 2020-01-23 RX ADMIN — GABAPENTIN 600 MG: 300 CAPSULE ORAL at 05:19

## 2020-01-23 RX ADMIN — CEFEPIME HYDROCHLORIDE 2 G: 2 INJECTION, POWDER, FOR SOLUTION INTRAVENOUS at 09:46

## 2020-01-23 RX ADMIN — OXYCODONE HYDROCHLORIDE 10 MG: 5 TABLET ORAL at 14:41

## 2020-01-23 RX ADMIN — DILTIAZEM HYDROCHLORIDE 60 MG: 60 TABLET, FILM COATED ORAL at 12:22

## 2020-01-23 RX ADMIN — METOCLOPRAMIDE HYDROCHLORIDE 5 MG: 5 SOLUTION ORAL at 09:46

## 2020-01-23 RX ADMIN — ANASTROZOLE 1 MG: 1 TABLET ORAL at 09:46

## 2020-01-23 RX ADMIN — METOCLOPRAMIDE HYDROCHLORIDE 5 MG: 5 SOLUTION ORAL at 17:31

## 2020-01-23 RX ADMIN — IPRATROPIUM BROMIDE AND ALBUTEROL SULFATE 3 ML: .5; 3 SOLUTION RESPIRATORY (INHALATION) at 12:07

## 2020-01-23 RX ADMIN — IPRATROPIUM BROMIDE AND ALBUTEROL SULFATE 3 ML: .5; 3 SOLUTION RESPIRATORY (INHALATION) at 16:19

## 2020-01-23 RX ADMIN — METOCLOPRAMIDE HYDROCHLORIDE 5 MG: 5 SOLUTION ORAL at 12:22

## 2020-01-23 RX ADMIN — METOCLOPRAMIDE HYDROCHLORIDE 5 MG: 5 SOLUTION ORAL at 20:44

## 2020-01-23 RX ADMIN — DILTIAZEM HYDROCHLORIDE 60 MG: 60 TABLET, FILM COATED ORAL at 17:30

## 2020-01-23 RX ADMIN — LANSOPRAZOLE 30 MG: KIT at 09:46

## 2020-01-23 RX ADMIN — CHOLESTYRAMINE LIGHT 4 G: 4 POWDER, FOR SUSPENSION ORAL at 22:46

## 2020-01-23 RX ADMIN — POTASSIUM CHLORIDE 40 MEQ: 1.5 POWDER, FOR SOLUTION ORAL at 02:42

## 2020-01-23 RX ADMIN — IPRATROPIUM BROMIDE AND ALBUTEROL SULFATE 3 ML: .5; 3 SOLUTION RESPIRATORY (INHALATION) at 05:28

## 2020-01-23 RX ADMIN — APIXABAN 5 MG: 5 TABLET, FILM COATED ORAL at 20:44

## 2020-01-23 RX ADMIN — ACETYLCYSTEINE 2 ML: 200 SOLUTION ORAL; RESPIRATORY (INHALATION) at 07:50

## 2020-01-23 RX ADMIN — GABAPENTIN 600 MG: 300 CAPSULE ORAL at 22:47

## 2020-01-23 RX ADMIN — Medication 1 PACKET: at 09:47

## 2020-01-23 RX ADMIN — GABAPENTIN 600 MG: 300 CAPSULE ORAL at 14:41

## 2020-01-23 RX ADMIN — OXYCODONE HYDROCHLORIDE 10 MG: 5 TABLET ORAL at 05:19

## 2020-01-23 RX ADMIN — GUAIFENESIN 600 MG: 100 SOLUTION ORAL at 12:22

## 2020-01-23 RX ADMIN — LISINOPRIL 40 MG: 20 TABLET ORAL at 09:47

## 2020-01-23 RX ADMIN — IPRATROPIUM BROMIDE AND ALBUTEROL SULFATE 3 ML: .5; 3 SOLUTION RESPIRATORY (INHALATION) at 02:58

## 2020-01-23 RX ADMIN — Medication 500 MG: at 20:44

## 2020-01-23 RX ADMIN — FENTANYL 1 PATCH: 50 PATCH TRANSDERMAL at 22:48

## 2020-01-23 RX ADMIN — AMLODIPINE BESYLATE 5 MG: 5 TABLET ORAL at 09:47

## 2020-01-23 RX ADMIN — CHOLESTYRAMINE LIGHT 4 G: 4 POWDER, FOR SUSPENSION ORAL at 09:47

## 2020-01-23 RX ADMIN — METOPROLOL TARTRATE 25 MG: 25 TABLET ORAL at 20:44

## 2020-01-23 RX ADMIN — CEFEPIME HYDROCHLORIDE 2 G: 2 INJECTION, POWDER, FOR SOLUTION INTRAVENOUS at 14:41

## 2020-01-23 RX ADMIN — DILTIAZEM HYDROCHLORIDE 60 MG: 60 TABLET, FILM COATED ORAL at 20:44

## 2020-01-23 RX ADMIN — IPRATROPIUM BROMIDE AND ALBUTEROL SULFATE 3 ML: .5; 3 SOLUTION RESPIRATORY (INHALATION) at 23:52

## 2020-01-23 RX ADMIN — DIGOXIN: 250 INJECTION, SOLUTION INTRAMUSCULAR; INTRAVENOUS; PARENTERAL at 01:32

## 2020-01-23 RX ADMIN — METOPROLOL TARTRATE 25 MG: 25 TABLET ORAL at 09:46

## 2020-01-23 RX ADMIN — IPRATROPIUM BROMIDE AND ALBUTEROL SULFATE 3 ML: .5; 3 SOLUTION RESPIRATORY (INHALATION) at 19:13

## 2020-01-23 RX ADMIN — ACETYLCYSTEINE 2 ML: 200 SOLUTION ORAL; RESPIRATORY (INHALATION) at 19:14

## 2020-01-23 RX ADMIN — LEVOTHYROXINE SODIUM 175 MCG: 175 TABLET ORAL at 09:47

## 2020-01-23 RX ADMIN — IPRATROPIUM BROMIDE AND ALBUTEROL SULFATE 3 ML: .5; 3 SOLUTION RESPIRATORY (INHALATION) at 07:50

## 2020-01-23 RX ADMIN — GUAIFENESIN 600 MG: 100 SOLUTION ORAL at 09:46

## 2020-01-23 RX ADMIN — Medication 10 ML: at 09:47

## 2020-01-23 RX ADMIN — APIXABAN 5 MG: 5 TABLET, FILM COATED ORAL at 09:46

## 2020-01-23 RX ADMIN — Medication 500 MG: at 09:47

## 2020-01-23 RX ADMIN — GUAIFENESIN 600 MG: 100 SOLUTION ORAL at 17:30

## 2020-01-23 RX ADMIN — GUAIFENESIN 600 MG: 100 SOLUTION ORAL at 20:45

## 2020-01-24 LAB
ANION GAP SERPL CALCULATED.3IONS-SCNC: 9 MMOL/L (ref 5–15)
BASOPHILS # BLD AUTO: 0 10*3/MM3 (ref 0–0.2)
BASOPHILS NFR BLD AUTO: 0.3 % (ref 0–1.5)
BUN BLD-MCNC: 12 MG/DL (ref 8–23)
BUN/CREAT SERPL: 34.3 (ref 7–25)
CALCIUM SPEC-SCNC: 8.4 MG/DL (ref 8.6–10.5)
CHLORIDE SERPL-SCNC: 92 MMOL/L (ref 98–107)
CO2 SERPL-SCNC: 30 MMOL/L (ref 22–29)
CREAT BLD-MCNC: 0.35 MG/DL (ref 0.57–1)
DEPRECATED RDW RBC AUTO: 47.7 FL (ref 37–54)
EOSINOPHIL # BLD AUTO: 0.2 10*3/MM3 (ref 0–0.4)
EOSINOPHIL NFR BLD AUTO: 2.9 % (ref 0.3–6.2)
ERYTHROCYTE [DISTWIDTH] IN BLOOD BY AUTOMATED COUNT: 16.7 % (ref 12.3–15.4)
GFR SERPL CREATININE-BSD FRML MDRD: >150 ML/MIN/1.73
GLUCOSE BLD-MCNC: 128 MG/DL (ref 65–99)
GLUCOSE BLDC GLUCOMTR-MCNC: 132 MG/DL (ref 70–105)
GLUCOSE BLDC GLUCOMTR-MCNC: 147 MG/DL (ref 70–105)
GLUCOSE BLDC GLUCOMTR-MCNC: 151 MG/DL (ref 70–105)
HCT VFR BLD AUTO: 24.9 % (ref 34–46.6)
HGB BLD-MCNC: 8.2 G/DL (ref 12–15.9)
LYMPHOCYTES # BLD AUTO: 0.5 10*3/MM3 (ref 0.7–3.1)
LYMPHOCYTES NFR BLD AUTO: 6 % (ref 19.6–45.3)
MCH RBC QN AUTO: 27 PG (ref 26.6–33)
MCHC RBC AUTO-ENTMCNC: 33.1 G/DL (ref 31.5–35.7)
MCV RBC AUTO: 81.5 FL (ref 79–97)
MONOCYTES # BLD AUTO: 0.5 10*3/MM3 (ref 0.1–0.9)
MONOCYTES NFR BLD AUTO: 6.4 % (ref 5–12)
NEUTROPHILS # BLD AUTO: 6.4 10*3/MM3 (ref 1.7–7)
NEUTROPHILS NFR BLD AUTO: 84.4 % (ref 42.7–76)
NRBC BLD AUTO-RTO: 0.2 /100 WBC (ref 0–0.2)
PLATELET # BLD AUTO: 311 10*3/MM3 (ref 140–450)
PMV BLD AUTO: 6.6 FL (ref 6–12)
POTASSIUM BLD-SCNC: 3.9 MMOL/L (ref 3.5–5.2)
RBC # BLD AUTO: 3.06 10*6/MM3 (ref 3.77–5.28)
SODIUM BLD-SCNC: 131 MMOL/L (ref 136–145)
WBC NRBC COR # BLD: 7.6 10*3/MM3 (ref 3.4–10.8)

## 2020-01-24 PROCEDURE — 99239 HOSP IP/OBS DSCHRG MGMT >30: CPT | Performed by: INTERNAL MEDICINE

## 2020-01-24 PROCEDURE — 99233 SBSQ HOSP IP/OBS HIGH 50: CPT | Performed by: INTERNAL MEDICINE

## 2020-01-24 PROCEDURE — 85025 COMPLETE CBC W/AUTO DIFF WBC: CPT | Performed by: NURSE PRACTITIONER

## 2020-01-24 PROCEDURE — 82962 GLUCOSE BLOOD TEST: CPT

## 2020-01-24 PROCEDURE — 99232 SBSQ HOSP IP/OBS MODERATE 35: CPT | Performed by: INTERNAL MEDICINE

## 2020-01-24 PROCEDURE — 80048 BASIC METABOLIC PNL TOTAL CA: CPT | Performed by: INTERNAL MEDICINE

## 2020-01-24 PROCEDURE — 94799 UNLISTED PULMONARY SVC/PX: CPT

## 2020-01-24 RX ORDER — DILTIAZEM HYDROCHLORIDE 60 MG/1
60 TABLET, FILM COATED ORAL 3 TIMES DAILY
Qty: 90 TABLET | Refills: 0 | Status: SHIPPED | OUTPATIENT
Start: 2020-01-24 | End: 2020-01-25

## 2020-01-24 RX ORDER — CHOLESTYRAMINE LIGHT 4 G/5.7G
1 POWDER, FOR SUSPENSION ORAL EVERY 12 HOURS SCHEDULED
Qty: 60 PACKET | Refills: 0 | Status: SHIPPED | OUTPATIENT
Start: 2020-01-24 | End: 2020-01-25

## 2020-01-24 RX ADMIN — METOCLOPRAMIDE HYDROCHLORIDE 5 MG: 5 SOLUTION ORAL at 13:16

## 2020-01-24 RX ADMIN — ACETYLCYSTEINE 2 ML: 200 SOLUTION ORAL; RESPIRATORY (INHALATION) at 19:49

## 2020-01-24 RX ADMIN — CHOLESTYRAMINE LIGHT 4 G: 4 POWDER, FOR SUSPENSION ORAL at 09:03

## 2020-01-24 RX ADMIN — METOCLOPRAMIDE HYDROCHLORIDE 5 MG: 5 SOLUTION ORAL at 09:05

## 2020-01-24 RX ADMIN — Medication 10 ML: at 09:06

## 2020-01-24 RX ADMIN — LANSOPRAZOLE 30 MG: KIT at 09:06

## 2020-01-24 RX ADMIN — APIXABAN 5 MG: 5 TABLET, FILM COATED ORAL at 09:05

## 2020-01-24 RX ADMIN — OXYCODONE HYDROCHLORIDE 10 MG: 5 TABLET ORAL at 13:16

## 2020-01-24 RX ADMIN — LEVOTHYROXINE SODIUM 175 MCG: 175 TABLET ORAL at 09:05

## 2020-01-24 RX ADMIN — ANASTROZOLE 1 MG: 1 TABLET ORAL at 09:06

## 2020-01-24 RX ADMIN — IPRATROPIUM BROMIDE AND ALBUTEROL SULFATE 3 ML: .5; 3 SOLUTION RESPIRATORY (INHALATION) at 07:31

## 2020-01-24 RX ADMIN — LISINOPRIL 40 MG: 20 TABLET ORAL at 09:05

## 2020-01-24 RX ADMIN — IPRATROPIUM BROMIDE AND ALBUTEROL SULFATE 3 ML: .5; 3 SOLUTION RESPIRATORY (INHALATION) at 14:43

## 2020-01-24 RX ADMIN — IPRATROPIUM BROMIDE AND ALBUTEROL SULFATE 3 ML: .5; 3 SOLUTION RESPIRATORY (INHALATION) at 02:59

## 2020-01-24 RX ADMIN — GUAIFENESIN 600 MG: 100 SOLUTION ORAL at 09:05

## 2020-01-24 RX ADMIN — IPRATROPIUM BROMIDE AND ALBUTEROL SULFATE 3 ML: .5; 3 SOLUTION RESPIRATORY (INHALATION) at 19:49

## 2020-01-24 RX ADMIN — GABAPENTIN 600 MG: 300 CAPSULE ORAL at 13:16

## 2020-01-24 RX ADMIN — Medication 500 MG: at 09:05

## 2020-01-24 RX ADMIN — OXYCODONE HYDROCHLORIDE 10 MG: 5 TABLET ORAL at 04:06

## 2020-01-24 RX ADMIN — DILTIAZEM HYDROCHLORIDE 60 MG: 60 TABLET, FILM COATED ORAL at 09:05

## 2020-01-24 RX ADMIN — METOPROLOL TARTRATE 25 MG: 25 TABLET ORAL at 09:05

## 2020-01-24 RX ADMIN — METOCLOPRAMIDE HYDROCHLORIDE 5 MG: 5 SOLUTION ORAL at 18:23

## 2020-01-24 RX ADMIN — ACETYLCYSTEINE 2 ML: 200 SOLUTION ORAL; RESPIRATORY (INHALATION) at 07:31

## 2020-01-24 RX ADMIN — GUAIFENESIN 600 MG: 100 SOLUTION ORAL at 13:16

## 2020-01-24 RX ADMIN — DILTIAZEM HYDROCHLORIDE 60 MG: 60 TABLET, FILM COATED ORAL at 18:23

## 2020-01-24 RX ADMIN — GABAPENTIN 600 MG: 300 CAPSULE ORAL at 05:41

## 2020-01-24 RX ADMIN — Medication 1 PACKET: at 09:03

## 2020-01-24 RX ADMIN — GUAIFENESIN 600 MG: 100 SOLUTION ORAL at 18:23

## 2020-01-25 VITALS
HEIGHT: 64 IN | OXYGEN SATURATION: 100 % | SYSTOLIC BLOOD PRESSURE: 148 MMHG | BODY MASS INDEX: 24.69 KG/M2 | WEIGHT: 144.62 LBS | HEART RATE: 84 BPM | RESPIRATION RATE: 18 BRPM | TEMPERATURE: 98.4 F | DIASTOLIC BLOOD PRESSURE: 78 MMHG

## 2020-01-25 LAB
BASOPHILS # BLD AUTO: 0 10*3/MM3 (ref 0–0.2)
BASOPHILS NFR BLD AUTO: 0.2 % (ref 0–1.5)
DEPRECATED RDW RBC AUTO: 49 FL (ref 37–54)
EOSINOPHIL # BLD AUTO: 0.2 10*3/MM3 (ref 0–0.4)
EOSINOPHIL NFR BLD AUTO: 2.7 % (ref 0.3–6.2)
ERYTHROCYTE [DISTWIDTH] IN BLOOD BY AUTOMATED COUNT: 16.9 % (ref 12.3–15.4)
GLUCOSE BLDC GLUCOMTR-MCNC: 146 MG/DL (ref 70–105)
GLUCOSE BLDC GLUCOMTR-MCNC: 156 MG/DL (ref 70–105)
HCT VFR BLD AUTO: 24.4 % (ref 34–46.6)
HGB BLD-MCNC: 8.3 G/DL (ref 12–15.9)
LYMPHOCYTES # BLD AUTO: 0.6 10*3/MM3 (ref 0.7–3.1)
LYMPHOCYTES NFR BLD AUTO: 7.7 % (ref 19.6–45.3)
MCH RBC QN AUTO: 28.2 PG (ref 26.6–33)
MCHC RBC AUTO-ENTMCNC: 34 G/DL (ref 31.5–35.7)
MCV RBC AUTO: 83.1 FL (ref 79–97)
MONOCYTES # BLD AUTO: 0.6 10*3/MM3 (ref 0.1–0.9)
MONOCYTES NFR BLD AUTO: 7.2 % (ref 5–12)
NEUTROPHILS # BLD AUTO: 6.4 10*3/MM3 (ref 1.7–7)
NEUTROPHILS NFR BLD AUTO: 82.2 % (ref 42.7–76)
NRBC BLD AUTO-RTO: 0 /100 WBC (ref 0–0.2)
PLATELET # BLD AUTO: 334 10*3/MM3 (ref 140–450)
PMV BLD AUTO: 7.1 FL (ref 6–12)
RBC # BLD AUTO: 2.93 10*6/MM3 (ref 3.77–5.28)
WBC NRBC COR # BLD: 7.8 10*3/MM3 (ref 3.4–10.8)

## 2020-01-25 PROCEDURE — 94799 UNLISTED PULMONARY SVC/PX: CPT

## 2020-01-25 PROCEDURE — 82962 GLUCOSE BLOOD TEST: CPT

## 2020-01-25 PROCEDURE — 97530 THERAPEUTIC ACTIVITIES: CPT

## 2020-01-25 PROCEDURE — 97162 PT EVAL MOD COMPLEX 30 MIN: CPT

## 2020-01-25 PROCEDURE — 25010000003 HEPARIN LOCK FLUCH PER 10 UNITS: Performed by: INTERNAL MEDICINE

## 2020-01-25 PROCEDURE — 99232 SBSQ HOSP IP/OBS MODERATE 35: CPT | Performed by: INTERNAL MEDICINE

## 2020-01-25 PROCEDURE — 94618 PULMONARY STRESS TESTING: CPT

## 2020-01-25 PROCEDURE — 99233 SBSQ HOSP IP/OBS HIGH 50: CPT | Performed by: INTERNAL MEDICINE

## 2020-01-25 PROCEDURE — 97116 GAIT TRAINING THERAPY: CPT

## 2020-01-25 PROCEDURE — 85025 COMPLETE CBC W/AUTO DIFF WBC: CPT | Performed by: NURSE PRACTITIONER

## 2020-01-25 RX ORDER — ACETYLCYSTEINE 200 MG/ML
2 SOLUTION ORAL; RESPIRATORY (INHALATION) 2 TIMES DAILY
Qty: 30 VIAL | Refills: 0 | Status: SHIPPED | OUTPATIENT
Start: 2020-01-25 | End: 2020-01-25 | Stop reason: SDUPTHER

## 2020-01-25 RX ORDER — CHOLESTYRAMINE LIGHT 4 G/5.7G
1 POWDER, FOR SUSPENSION ORAL EVERY 12 HOURS SCHEDULED
Qty: 60 PACKET | Refills: 0 | Status: CANCELLED | OUTPATIENT
Start: 2020-01-25 | End: 2020-02-24

## 2020-01-25 RX ORDER — DILTIAZEM HYDROCHLORIDE 60 MG/1
60 TABLET, FILM COATED ORAL 3 TIMES DAILY
Qty: 90 TABLET | Refills: 0 | Status: CANCELLED | OUTPATIENT
Start: 2020-01-25 | End: 2020-02-24

## 2020-01-25 RX ORDER — IPRATROPIUM BROMIDE AND ALBUTEROL SULFATE 2.5; .5 MG/3ML; MG/3ML
3 SOLUTION RESPIRATORY (INHALATION) EVERY 4 HOURS PRN
Qty: 360 ML | Refills: 0 | Status: CANCELLED | OUTPATIENT
Start: 2020-01-25

## 2020-01-25 RX ORDER — DILTIAZEM HYDROCHLORIDE 60 MG/1
60 TABLET, FILM COATED ORAL 3 TIMES DAILY
Qty: 90 TABLET | Refills: 0 | Status: SHIPPED | OUTPATIENT
Start: 2020-01-25 | End: 2020-01-25

## 2020-01-25 RX ORDER — IPRATROPIUM BROMIDE AND ALBUTEROL SULFATE 2.5; .5 MG/3ML; MG/3ML
3 SOLUTION RESPIRATORY (INHALATION) EVERY 4 HOURS PRN
Qty: 360 ML | Refills: 0 | Status: SHIPPED | OUTPATIENT
Start: 2020-01-25

## 2020-01-25 RX ORDER — HEPARIN SODIUM (PORCINE) LOCK FLUSH IV SOLN 100 UNIT/ML 100 UNIT/ML
100 SOLUTION INTRAVENOUS ONCE
Status: COMPLETED | OUTPATIENT
Start: 2020-01-25 | End: 2020-01-25

## 2020-01-25 RX ORDER — CHOLESTYRAMINE LIGHT 4 G/5.7G
1 POWDER, FOR SUSPENSION ORAL EVERY 12 HOURS SCHEDULED
Qty: 60 PACKET | Refills: 0 | Status: SHIPPED | OUTPATIENT
Start: 2020-01-25 | End: 2020-01-25

## 2020-01-25 RX ORDER — ACETYLCYSTEINE 200 MG/ML
2 SOLUTION ORAL; RESPIRATORY (INHALATION) 2 TIMES DAILY
Qty: 30 VIAL | Refills: 0 | Status: CANCELLED | OUTPATIENT
Start: 2020-01-25

## 2020-01-25 RX ORDER — IPRATROPIUM BROMIDE AND ALBUTEROL SULFATE 2.5; .5 MG/3ML; MG/3ML
3 SOLUTION RESPIRATORY (INHALATION) EVERY 4 HOURS PRN
Qty: 360 ML | Refills: 0 | Status: SHIPPED | OUTPATIENT
Start: 2020-01-25 | End: 2020-01-25 | Stop reason: SDUPTHER

## 2020-01-25 RX ORDER — CHOLESTYRAMINE LIGHT 4 G/5.7G
1 POWDER, FOR SUSPENSION ORAL EVERY 12 HOURS SCHEDULED
Qty: 60 PACKET | Refills: 0 | Status: SHIPPED | OUTPATIENT
Start: 2020-01-25 | End: 2020-02-24

## 2020-01-25 RX ORDER — ACETYLCYSTEINE 200 MG/ML
2 SOLUTION ORAL; RESPIRATORY (INHALATION) 2 TIMES DAILY
Qty: 30 VIAL | Refills: 0 | Status: SHIPPED | OUTPATIENT
Start: 2020-01-25

## 2020-01-25 RX ORDER — DILTIAZEM HYDROCHLORIDE 60 MG/1
60 TABLET, FILM COATED ORAL 3 TIMES DAILY
Qty: 90 TABLET | Refills: 0 | Status: SHIPPED | OUTPATIENT
Start: 2020-01-25 | End: 2020-02-24

## 2020-01-25 RX ADMIN — HEPARIN SODIUM (PORCINE) LOCK FLUSH IV SOLN 100 UNIT/ML 500 UNITS: 100 SOLUTION at 18:26

## 2020-01-25 RX ADMIN — IPRATROPIUM BROMIDE AND ALBUTEROL SULFATE 3 ML: .5; 3 SOLUTION RESPIRATORY (INHALATION) at 08:15

## 2020-01-25 RX ADMIN — METOPROLOL TARTRATE 25 MG: 25 TABLET ORAL at 00:38

## 2020-01-25 RX ADMIN — OXYCODONE HYDROCHLORIDE 10 MG: 5 TABLET ORAL at 00:37

## 2020-01-25 RX ADMIN — METOCLOPRAMIDE HYDROCHLORIDE 5 MG: 5 SOLUTION ORAL at 17:06

## 2020-01-25 RX ADMIN — DILTIAZEM HYDROCHLORIDE 60 MG: 60 TABLET, FILM COATED ORAL at 09:49

## 2020-01-25 RX ADMIN — GUAIFENESIN 600 MG: 100 SOLUTION ORAL at 00:38

## 2020-01-25 RX ADMIN — GABAPENTIN 600 MG: 300 CAPSULE ORAL at 00:37

## 2020-01-25 RX ADMIN — GUAIFENESIN 600 MG: 100 SOLUTION ORAL at 17:06

## 2020-01-25 RX ADMIN — ACETYLCYSTEINE 4 ML: 200 SOLUTION ORAL; RESPIRATORY (INHALATION) at 08:16

## 2020-01-25 RX ADMIN — OXYCODONE HYDROCHLORIDE 10 MG: 5 TABLET ORAL at 06:37

## 2020-01-25 RX ADMIN — LANSOPRAZOLE 30 MG: KIT at 09:48

## 2020-01-25 RX ADMIN — IPRATROPIUM BROMIDE AND ALBUTEROL SULFATE 3 ML: .5; 3 SOLUTION RESPIRATORY (INHALATION) at 03:29

## 2020-01-25 RX ADMIN — GABAPENTIN 600 MG: 300 CAPSULE ORAL at 06:37

## 2020-01-25 RX ADMIN — Medication 1 PACKET: at 09:49

## 2020-01-25 RX ADMIN — GABAPENTIN 600 MG: 300 CAPSULE ORAL at 13:39

## 2020-01-25 RX ADMIN — Medication 500 MG: at 09:49

## 2020-01-25 RX ADMIN — APIXABAN 5 MG: 5 TABLET, FILM COATED ORAL at 00:37

## 2020-01-25 RX ADMIN — METOCLOPRAMIDE HYDROCHLORIDE 5 MG: 5 SOLUTION ORAL at 00:38

## 2020-01-25 RX ADMIN — OXYCODONE HYDROCHLORIDE 10 MG: 5 TABLET ORAL at 17:06

## 2020-01-25 RX ADMIN — CHOLESTYRAMINE LIGHT 4 G: 4 POWDER, FOR SUSPENSION ORAL at 00:38

## 2020-01-25 RX ADMIN — Medication 10 ML: at 09:49

## 2020-01-25 RX ADMIN — GUAIFENESIN 600 MG: 100 SOLUTION ORAL at 13:39

## 2020-01-25 RX ADMIN — METOCLOPRAMIDE HYDROCHLORIDE 5 MG: 5 SOLUTION ORAL at 06:37

## 2020-01-25 RX ADMIN — LISINOPRIL 40 MG: 20 TABLET ORAL at 06:37

## 2020-01-25 RX ADMIN — OXYCODONE HYDROCHLORIDE 10 MG: 5 TABLET ORAL at 11:02

## 2020-01-25 RX ADMIN — CHOLESTYRAMINE LIGHT 4 G: 4 POWDER, FOR SUSPENSION ORAL at 11:02

## 2020-01-25 RX ADMIN — DILTIAZEM HYDROCHLORIDE 60 MG: 60 TABLET, FILM COATED ORAL at 00:37

## 2020-01-25 RX ADMIN — IPRATROPIUM BROMIDE AND ALBUTEROL SULFATE 3 ML: .5; 3 SOLUTION RESPIRATORY (INHALATION) at 00:08

## 2020-01-25 RX ADMIN — METOCLOPRAMIDE HYDROCHLORIDE 5 MG: 5 SOLUTION ORAL at 11:02

## 2020-01-25 RX ADMIN — IPRATROPIUM BROMIDE AND ALBUTEROL SULFATE 3 ML: .5; 3 SOLUTION RESPIRATORY (INHALATION) at 14:54

## 2020-01-25 RX ADMIN — APIXABAN 5 MG: 5 TABLET, FILM COATED ORAL at 09:47

## 2020-01-25 RX ADMIN — METOPROLOL TARTRATE 25 MG: 25 TABLET ORAL at 09:49

## 2020-01-25 RX ADMIN — GUAIFENESIN 600 MG: 100 SOLUTION ORAL at 09:47

## 2020-01-25 RX ADMIN — Medication 500 MG: at 00:38

## 2020-01-25 RX ADMIN — IPRATROPIUM BROMIDE AND ALBUTEROL SULFATE 3 ML: .5; 3 SOLUTION RESPIRATORY (INHALATION) at 11:28

## 2020-01-25 RX ADMIN — ANASTROZOLE 1 MG: 1 TABLET ORAL at 09:49

## 2020-01-25 RX ADMIN — DILTIAZEM HYDROCHLORIDE 60 MG: 60 TABLET, FILM COATED ORAL at 17:06

## 2020-01-25 RX ADMIN — LEVOTHYROXINE SODIUM 175 MCG: 175 TABLET ORAL at 09:48

## 2020-01-26 ENCOUNTER — READMISSION MANAGEMENT (OUTPATIENT)
Dept: CALL CENTER | Facility: HOSPITAL | Age: 70
End: 2020-01-26

## 2020-01-26 NOTE — OUTREACH NOTE
Prep Survey      Responses   Facility patient discharged from?  [facility]   Is patient eligible?  Yes   Discharge diagnosis  Healthcare acquired pneumonia,    Atrial flutter with rapid ventricular response   Does the patient have one of the following disease processes/diagnoses(primary or secondary)?  COPD/Pneumonia   Does the patient have Home health ordered?  Yes   What is the Home health agency?    [facility])      Is there a DME ordered?  Yes   What DME was ordered?  [facility] for tube feeding   General alerts for this patient  Tongue cancer s/p resection.  Permanent trach and G-tube.  Unable to speak due to tongue resection.  Undergoing radiation at .    Prep survey completed?  Yes          Mary Coello RN

## 2020-01-27 ENCOUNTER — READMISSION MANAGEMENT (OUTPATIENT)
Dept: CALL CENTER | Facility: HOSPITAL | Age: 70
End: 2020-01-27

## 2020-01-27 LAB
FLUAV RNA SPEC QL NAA+PROBE: NEGATIVE
FLUBV RNA SPEC QL NAA+PROBE: NEGATIVE
HADV DNA SPEC QL NAA+PROBE: NEGATIVE
HMPV RNA SPEC QL NAA+PROBE: NEGATIVE
HPIV1 RNA SPEC QL NAA+PROBE: NEGATIVE
HPIV2 SPEC QL CULT: NEGATIVE
HPIV3 SPEC QL CULT: NEGATIVE
RHINOVIRUS RNA SPEC QL NAA+PROBE: POSITIVE
RSV A: NEGATIVE
RSV B RNA SPEC QL NAA+PROBE: NEGATIVE

## 2020-01-27 NOTE — OUTREACH NOTE
COPD/PN Week 1 Survey      Responses   Facility patient discharged from?  [facility]   Does the patient have one of the following disease processes/diagnoses(primary or secondary)?  COPD/Pneumonia   Is there a successful TCM telephone encounter documented?  No   Was the primary reason for admission:  Pneumonia   Week 1 attempt successful?  Yes   Call start time  1114   Call end time  1117   Discharge diagnosis  Healthcare acquired pneumonia,    Atrial flutter with rapid ventricular response   Is patient permission given to speak with other caregiver?  Yes   List who call center can speak with  Adrienne, daughter   Person spoke with today (if not patient) and relationship  Adrienne, daughter   Meds reviewed with patient/caregiver?  Yes   Is the patient having any side effects they believe may be caused by any medication additions or changes?  No   Does the patient have all medications ordered at discharge?  Yes   Is the patient taking all medications as directed (includes completed medication regime)?  Yes   Does the patient have a primary care provider?   Yes   Does the patient have an appointment with their PCP or pulmonologist within 7 days of discharge?  Yes   Comments regarding PCP  Lorenza Parham MD PCP   Has the patient kept scheduled appointments due by today?  N/A   What [facility] agency?    [facility]    Has home health visited the patient within 72 hours of discharge?  Call prior to 72 hours   Home health comments  Daughter states that she has  number if they do not contact by tomorrow.    What DME was ordered?  [facility] for tube feeding   Psychosocial issues?  No   Did the patient receive a copy of their discharge instructions?  Yes   Nursing interventions  -- [Reviewed with daughter]   What is the patient's perception of their health status since discharge?  Improving   Is the patient/caregiver able to teach back the hierarchy of who to call/visit for symptoms/problems? PCP, Specialist, Home  health nurse, Urgent Care, ED, 911  Yes   Is the patient/caregiver able to teach back signs and symptoms of worsening condition:  Fever/chills, Shortness of breath, Chest pain   Is the patient/caregiver able to teach back importance of completing antibiotic course of treatment?  -- [Patient did not go home on antibiotic. ]   Week 1 call completed?  Yes   Revoked  No further contact(revokes)-requires comment   Graduated/Revoked comments  Daughter does not want further follow up calls.           Jammie Gomez RN

## 2020-01-29 PROCEDURE — 93010 ELECTROCARDIOGRAM REPORT: CPT | Performed by: INTERNAL MEDICINE

## 2020-01-30 LAB — VIRAL CULTURE, GENERAL: NORMAL

## 2020-01-31 PROCEDURE — 93010 ELECTROCARDIOGRAM REPORT: CPT | Performed by: INTERNAL MEDICINE

## 2020-02-06 LAB — FUNGUS WND CULT: ABNORMAL

## 2020-03-03 LAB
MYCOBACTERIUM SPEC CULT: NORMAL
NIGHT BLUE STAIN TISS: NORMAL

## 2022-05-07 NOTE — PLAN OF CARE
Problem: Pneumonia (Adult)  Goal: Signs and Symptoms of Listed Potential Problems Will be Absent, Minimized or Managed (Pneumonia)  Outcome: Ongoing (interventions implemented as appropriate)         No

## (undated) DEVICE — SOL CHLORHEXIDINE 4% CHG GLUCONATE 4OZ

## (undated) DEVICE — BAPTIST FLOYD BRONCHOSCOPY: Brand: MEDLINE INDUSTRIES, INC.

## (undated) DEVICE — SUT SILK 2/0 TIES 18IN A185H

## (undated) DEVICE — JACKSON-PRATT 100CC BULB RESERVOIR: Brand: CARDINAL HEALTH

## (undated) DEVICE — STANDARD HYPODERMIC NEEDLE,POLYPROPYLENE HUB: Brand: MONOJECT

## (undated) DEVICE — DRN JP FLT NO TROC SIL 3/4PERF 10MM

## (undated) DEVICE — 3M™ STERI-DRAPE™ INSTRUMENT POUCH 1018: Brand: STERI-DRAPE™

## (undated) DEVICE — ELECTRODE 8227410 PAIRED 2 CH SET ROHS

## (undated) DEVICE — IRRIGATOR BULB ASEPTO 60CC STRL

## (undated) DEVICE — DISPOSABLE BIPOLAR CABLE 12FT. (3.6M): Brand: KIRWAN

## (undated) DEVICE — NDL HYPO PRECISIONGLIDE/REG 30G 1/2 BRN

## (undated) DEVICE — PK ENDO GI 50

## (undated) DEVICE — INTENDED FOR TISSUE SEPARATION, AND OTHER PROCEDURES THAT REQUIRE A SHARP SURGICAL BLADE TO PUNCTURE OR CUT.: Brand: BARD-PARKER ® CARBON RIB-BACK BLADES

## (undated) DEVICE — GLV SURG BIOGEL LTX PF 7 1/2

## (undated) DEVICE — GAUZE,SPONGE,4"X4",16PLY,XRAY,STRL,LF: Brand: MEDLINE

## (undated) DEVICE — SYR LUERLOK 5CC

## (undated) DEVICE — GAUZE,SPONGE,FLUFF,6"X6.75",STRL,10/TRAY: Brand: MEDLINE

## (undated) DEVICE — PK ENT 40

## (undated) DEVICE — UNDYED BRAIDED (POLYGLACTIN 910), SYNTHETIC ABSORBABLE SUTURE: Brand: COATED VICRYL

## (undated) DEVICE — SKIN PREP TRAY W/CHG: Brand: MEDLINE INDUSTRIES, INC.

## (undated) DEVICE — GLV SURG TRIUMPH ORTHO W/ALOE PF LTX 8 STRL

## (undated) DEVICE — TOWEL,OR,DSP,ST,BLUE,STD,4/PK,20PK/CS: Brand: MEDLINE

## (undated) DEVICE — HS FOCUS 17 CM PLUS ADAPTIVE: Brand: HARMONIC

## (undated) DEVICE — BITEBLOCK ENDO W/STRAP 60F A/ LF DISP

## (undated) DEVICE — PAPR PRNT PK SONY W RIBN UPC55

## (undated) DEVICE — MAGNETIC DRAPE: Brand: DEVON

## (undated) DEVICE — EMG TUBE 8229707 NIM TRIVANTAGE 7.0MM ID: Brand: NIM TRIVANTAGE™

## (undated) DEVICE — BANDAGE,GAUZE,BULKEE II,4.5"X4.1YD,STRL: Brand: MEDLINE

## (undated) DEVICE — ELECTRD BLD EDGE/INSUL1P 2.4X5.1MM STRL

## (undated) DEVICE — MSK ENDO PORT O2 POM ELITE CURAPLEX A/

## (undated) DEVICE — GOWN,NON-REINFORCED,SIRUS,SET IN SLV,XXL: Brand: MEDLINE

## (undated) DEVICE — GLV SURG BIOGEL M LTX PF 7 1/2

## (undated) DEVICE — SUT SILK 3/0 SH CR5 18IN C0135

## (undated) DEVICE — SPNG GZ WOVN 4X4IN 12PLY 10/BX STRL

## (undated) DEVICE — SUT SILK 3/0 SH 30IN K832H

## (undated) DEVICE — SUT ETHLN 5/0 PS2 18IN 1666H

## (undated) DEVICE — ELECTRD NDL EZ CLN MOD 2.75IN

## (undated) DEVICE — DRN PENRS 5/8X18IN LTX

## (undated) DEVICE — GLV SURG BIOGEL LTX PF 6 1/2